# Patient Record
Sex: FEMALE | Race: WHITE | NOT HISPANIC OR LATINO | Employment: OTHER | URBAN - METROPOLITAN AREA
[De-identification: names, ages, dates, MRNs, and addresses within clinical notes are randomized per-mention and may not be internally consistent; named-entity substitution may affect disease eponyms.]

---

## 2017-09-22 ENCOUNTER — OFFICE VISIT (OUTPATIENT)
Dept: FAMILY MEDICINE | Facility: CLINIC | Age: 82
End: 2017-09-22
Payer: MEDICARE

## 2017-09-22 VITALS
TEMPERATURE: 98 F | SYSTOLIC BLOOD PRESSURE: 127 MMHG | HEIGHT: 67 IN | WEIGHT: 168 LBS | DIASTOLIC BLOOD PRESSURE: 74 MMHG | BODY MASS INDEX: 26.37 KG/M2 | HEART RATE: 76 BPM

## 2017-09-22 DIAGNOSIS — R53.83 FATIGUE, UNSPECIFIED TYPE: ICD-10-CM

## 2017-09-22 DIAGNOSIS — F51.04 PSYCHOPHYSIOLOGICAL INSOMNIA: ICD-10-CM

## 2017-09-22 DIAGNOSIS — Z00.00 ANNUAL PHYSICAL EXAM: ICD-10-CM

## 2017-09-22 DIAGNOSIS — J30.9 ALLERGIC SINUSITIS: Primary | ICD-10-CM

## 2017-09-22 DIAGNOSIS — Z13.220 SCREENING FOR LIPID DISORDERS: ICD-10-CM

## 2017-09-22 DIAGNOSIS — K64.4 RESIDUAL HEMORRHOIDAL SKIN TAGS: ICD-10-CM

## 2017-09-22 DIAGNOSIS — Z13.1 SCREENING FOR DIABETES MELLITUS: ICD-10-CM

## 2017-09-22 DIAGNOSIS — K59.00 CONSTIPATION, UNSPECIFIED CONSTIPATION TYPE: ICD-10-CM

## 2017-09-22 DIAGNOSIS — F32.0 MAJOR DEPRESSIVE DISORDER, SINGLE EPISODE, MILD WITH ANXIOUS DISTRESS: ICD-10-CM

## 2017-09-22 DIAGNOSIS — Z23 NEED FOR IMMUNIZATION AGAINST INFLUENZA: ICD-10-CM

## 2017-09-22 DIAGNOSIS — M81.0 SENILE OSTEOPOROSIS: ICD-10-CM

## 2017-09-22 DIAGNOSIS — E78.5 HYPERLIPIDEMIA, UNSPECIFIED HYPERLIPIDEMIA TYPE: ICD-10-CM

## 2017-09-22 PROCEDURE — 1159F MED LIST DOCD IN RCRD: CPT | Mod: ,,, | Performed by: NURSE PRACTITIONER

## 2017-09-22 PROCEDURE — 99213 OFFICE O/P EST LOW 20 MIN: CPT | Mod: PBBFAC,PO,25 | Performed by: NURSE PRACTITIONER

## 2017-09-22 PROCEDURE — G0008 ADMIN INFLUENZA VIRUS VAC: HCPCS | Mod: PBBFAC,PO

## 2017-09-22 PROCEDURE — 99999 PR PBB SHADOW E&M-EST. PATIENT-LVL III: CPT | Mod: PBBFAC,,, | Performed by: NURSE PRACTITIONER

## 2017-09-22 PROCEDURE — 99205 OFFICE O/P NEW HI 60 MIN: CPT | Mod: S$PBB,,, | Performed by: NURSE PRACTITIONER

## 2017-09-22 RX ORDER — DOCUSATE SODIUM 100 MG/1
100 CAPSULE, LIQUID FILLED ORAL DAILY
Qty: 30 CAPSULE | Refills: 0
Start: 2017-09-22 | End: 2020-12-03

## 2017-09-22 RX ORDER — FLUTICASONE PROPIONATE 50 MCG
1 SPRAY, SUSPENSION (ML) NASAL 2 TIMES DAILY
Qty: 16 G | Refills: 3 | Status: SHIPPED | OUTPATIENT
Start: 2017-09-22 | End: 2020-12-03

## 2017-09-22 RX ORDER — ALPRAZOLAM 0.25 MG/1
0.25 TABLET ORAL DAILY PRN
COMMUNITY
End: 2017-10-20 | Stop reason: SDUPTHER

## 2017-09-22 RX ORDER — ESCITALOPRAM OXALATE 20 MG/1
20 TABLET ORAL DAILY
Qty: 30 TABLET | Refills: 2 | Status: SHIPPED | OUTPATIENT
Start: 2017-09-22 | End: 2017-10-20

## 2017-09-22 NOTE — PROGRESS NOTES
Subjective:       Patient ID: Peg Moreno is a 81 y.o. female.    Chief Complaint: Sinus Problem    HPI   Chief Complaint  Chief Complaint   Patient presents with    Sinus Problem       HPI  Peg Moreno is a 81 y.o. female with multiple medical diagnoses as listed in the medical history and problem list that presents as a new patient to establish care. Patient is currently not taking any medication. Does take xanax 0.25 that was prescribed by physician after her  , states takes with extreme anxiety, about once per week.  Currently having sinus issues with swelling to left side of face that is chronic.  Has had previous sinus surgery and has seen Dr. Goodman in past, CT scan of sinuses negative with exception of scar tissue.  Saw Dr. Goodman recently and he prescribed Omnicef 300 mg BID which patient stopped yesterday after 9 days of treatment.  Recent problem with hemorrhoids and cream was prescribed by Dr. Bassett, prescribed a cream that has helped with hemorrhoids but still has some perineal irritation and an area to vulva that looks abnormal that she is concerned about.       PAST MEDICAL HISTORY:  Past Medical History:   Diagnosis Date    Depression        PAST SURGICAL HISTORY:  Past Surgical History:   Procedure Laterality Date    APPENDECTOMY      HERNIA REPAIR      SINUS SURGERY      TONSILLECTOMY         SOCIAL HISTORY:  Social History     Social History    Marital status:      Spouse name: N/A    Number of children: N/A    Years of education: N/A     Occupational History    Not on file.     Social History Main Topics    Smoking status: Former Smoker     Quit date: 11/15/1985    Smokeless tobacco: Never Used    Alcohol use No      Comment: rare    Drug use: Unknown    Sexual activity: Not on file     Other Topics Concern    Not on file     Social History Narrative    No narrative on file       FAMILY HISTORY:  No family history on file.    ALLERGIES  AND MEDICATIONS: updated and reviewed.  Review of patient's allergies indicates:   Allergen Reactions    Amoxicillin Anxiety     Headaches      Current Outpatient Prescriptions   Medication Sig Dispense Refill    alprazolam (XANAX) 0.25 MG tablet Take 0.25 mg by mouth daily as needed for Anxiety.      docusate sodium (COLACE) 100 MG capsule Take 1 capsule (100 mg total) by mouth once daily. 30 capsule 0    escitalopram oxalate (LEXAPRO) 20 MG tablet Take 1 tablet (20 mg total) by mouth once daily. 1/2 tab x 7days, then 1 tablet daily 30 tablet 2    fluticasone (FLONASE) 50 mcg/actuation nasal spray 1 spray by Each Nare route 2 (two) times daily. 16 g 3     No current facility-administered medications for this visit.          Review of Systems   Constitutional: Positive for appetite change. Negative for activity change, chills, fatigue, fever and unexpected weight change.        States appetite is fair, doesn't like eating alone, no weight loss   HENT: Positive for congestion, facial swelling, rhinorrhea, sinus pain and sore throat. Negative for ear pain and hearing loss.         C/o one week of sore throat.  Suffers with sinus allergies with runny nose and sore throat, left sided facial pain and swelling including swelling to left roof of mouth when waking this morning   Eyes: Negative for visual disturbance.        Vision good with eye glasses   Respiratory: Positive for cough. Negative for shortness of breath.         Cough that is dry, tickle in throat   Cardiovascular: Negative for chest pain, palpitations and leg swelling.   Gastrointestinal: Negative for abdominal pain, constipation, diarrhea, nausea and vomiting.        Takes stool softener with relief of constipation, recent hemorrhoids   Genitourinary: Positive for urgency. Negative for difficulty urinating, dysuria and frequency.        Occasional urge incontinence, not at night   Musculoskeletal: Negative for arthralgias and myalgias.   Skin:  "Negative for rash and wound.   Neurological: Negative for dizziness, tremors, weakness, numbness and headaches.   Hematological: Negative for adenopathy.   Psychiatric/Behavioral: Positive for dysphoric mood and sleep disturbance. Negative for suicidal ideas. The patient is nervous/anxious.         States does have some feelings of depression, has taken zoloft in the past and is not sure it helped.  Occasional anxiety. Does not sleep well due to waking to use bathroom and sometimes cannot go back to sleep.      Patient Health Questionnaire Depression Scale (PHQ-9):    Over the last 2 weeks, how often have you been bothered by any of the following problems?  (Not at all: 0; several days: 1, more than half the days: 2, nearly every day: 3)    1. Little interest or pleasure in doing things: 0  2. Feeling down, depressed, or hopeless: 1  3. Trouble falling or staying asleep, or sleeping too much: 3  4. Feeling tired or having little energy:0  5. Poor appetite or overeating:3  6. Feeling bad about yourself, or that you are a failure, or have let yourself or your family down:1  7. Trouble concentrating on things, such as reading the newspaper or watching television: 0  8. Moving or speaking so slowly that other people could have noticed. Or the opposite- being so fidgety or restless that you have been moving       around a lot more than usual: 0  9. Thoughts that you would be better off dead, or of hurting yourself in some way 0    Total Score: 8    (Score >15 - major depression; Score >20 - severe major depression)  Objective:       Vitals:    09/22/17 1401   BP: 127/74   BP Location: Right arm   Patient Position: Sitting   BP Method: Small (Manual)   Pulse: 76   Temp: 98.2 °F (36.8 °C)   TempSrc: Oral   Weight: 76.2 kg (168 lb)   Height: 5' 6.5" (1.689 m)     Physical Exam   Constitutional: She appears well-developed and well-nourished. No distress.   HENT:   Head: Normocephalic and atraumatic.   Right Ear: Tympanic " membrane, external ear and ear canal normal.   Left Ear: Tympanic membrane, external ear and ear canal normal.   Nose: Mucosal edema present. Right sinus exhibits no maxillary sinus tenderness and no frontal sinus tenderness. Left sinus exhibits no maxillary sinus tenderness and no frontal sinus tenderness.   Mouth/Throat: Oropharynx is clear and moist and mucous membranes are normal.   Nasal mucosa to right nare pale and boggy, left nare erythematous and edematous   Eyes: EOM are normal. Pupils are equal, round, and reactive to light. Right conjunctiva is not injected. Left conjunctiva is not injected.   Neck: Full passive range of motion without pain. Normal carotid pulses present. Carotid bruit is not present.   Cardiovascular: Normal rate and regular rhythm.    Pulses:       Carotid pulses are 2+ on the right side, and 2+ on the left side.       Radial pulses are 2+ on the right side, and 2+ on the left side.        Posterior tibial pulses are 2+ on the right side, and 2+ on the left side.   Pulmonary/Chest: Effort normal and breath sounds normal. No respiratory distress. She has no decreased breath sounds. She has no wheezes. She has no rhonchi. She has no rales.   Abdominal: Soft. Normal appearance and bowel sounds are normal. She exhibits no distension. There is no hepatosplenomegaly. There is no tenderness. No hernia.   Genitourinary:   Genitourinary Comments: Area of labia indicated unpigmented macular discoloration.  No evidence of infection or other abnormality.  Normal variation vs scar.  Skin tag hemorrhoids noted to external rectal area, non-thrombosed, non tender.    Lymphadenopathy:     She has no cervical adenopathy.   Neurological: She is alert. She has normal strength. No cranial nerve deficit. Gait normal.   Skin: Skin is warm, dry and intact. She is not diaphoretic.   Psychiatric: She has a normal mood and affect. Her speech is normal and behavior is normal. Thought content normal. Cognition and  memory are normal.       Assessment:       1. Allergic sinusitis    2. Annual physical exam    3. Residual hemorrhoidal skin tags    4. Major depressive disorder, single episode, mild with anxious distress    5. Psychophysiological insomnia    6. Constipation, unspecified constipation type    7. Fatigue, unspecified type    8. Screening for lipid disorders    9. Screening for diabetes mellitus    10. Need for immunization against influenza    11. BMI 26.0-26.9,adult    12. Hyperlipidemia, unspecified hyperlipidemia type        Plan:   Peg was seen today for sinus problem.    Diagnoses and all orders for this visit:    Allergic sinusitis  -     fluticasone (FLONASE) 50 mcg/actuation nasal spray; 1 spray by Each Nare route 2 (two) times daily.    Annual physical exam  -     CBC auto differential; Future    Residual hemorrhoidal skin tags   Continue cream prescribed by Dr. Bassett as needed  Major depressive disorder, single episode, mild with anxious distress  -     escitalopram oxalate (LEXAPRO) 20 MG tablet; Take 1 tablet (20 mg total) by mouth once daily. 1/2 tab x 7days, then 1 tablet daily  -     CBC auto differential; Future  - Follow up 4 weeks     Psychophysiological insomnia    escitalopram oxalate (LEXAPRO) 20 MG tablet; Take 1 tablet (20 mg total) by mouth once daily. 1/2 tab x 7days, then 1 tablet daily  Constipation, unspecified constipation type  -     docusate sodium (COLACE) 100 MG capsule; Take 1 capsule (100 mg total) by mouth once daily.    Fatigue, unspecified type  -     CBC auto differential; Future  -     Comprehensive metabolic panel; Future  -     TSH; Future    Screening for lipid disorders  -     Comprehensive metabolic panel; Future    Screening for diabetes mellitus  -     Comprehensive metabolic panel; Future  -     Lipid panel; Future    Need for immunization against influenza  -     Influenza - High Dose (65+) (PF) (IM)    BMI 26.0-26.9,adult   Normal for patient, continue as  is    Hyperlipidemia, unspecified hyperlipidemia type  -     Lipid panel; Future    Discussed Dexa Bone Density scan, patient to follow up in 4 weeks, will postpone until then.   Will request records from [facility] for DEXA bone density, mammogram, CT sinuses.

## 2017-09-22 NOTE — PATIENT INSTRUCTIONS
Step-by-Step  Using Nasal Spray    Date Last Reviewed: 5/27/2015  © 2232-3410 [employer]. [address] [place] [address], [place] [address]. All rights reserved. This information is not intended as a substitute for professional medical care. Always follow your healthcare professional's instructions.

## 2017-09-25 ENCOUNTER — LAB VISIT (OUTPATIENT)
Dept: LAB | Facility: HOSPITAL | Age: 82
End: 2017-09-25
Attending: NURSE PRACTITIONER
Payer: MEDICARE

## 2017-09-25 DIAGNOSIS — R53.83 FATIGUE, UNSPECIFIED TYPE: ICD-10-CM

## 2017-09-25 DIAGNOSIS — E78.5 HYPERLIPIDEMIA, UNSPECIFIED HYPERLIPIDEMIA TYPE: ICD-10-CM

## 2017-09-25 DIAGNOSIS — Z13.1 SCREENING FOR DIABETES MELLITUS: ICD-10-CM

## 2017-09-25 DIAGNOSIS — F32.0 MAJOR DEPRESSIVE DISORDER, SINGLE EPISODE, MILD WITH ANXIOUS DISTRESS: ICD-10-CM

## 2017-09-25 DIAGNOSIS — Z13.220 SCREENING FOR LIPID DISORDERS: ICD-10-CM

## 2017-09-25 DIAGNOSIS — Z00.00 ANNUAL PHYSICAL EXAM: ICD-10-CM

## 2017-09-25 LAB
ALBUMIN SERPL BCP-MCNC: 3.6 G/DL
ALP SERPL-CCNC: 70 U/L
ALT SERPL W/O P-5'-P-CCNC: 20 U/L
ANION GAP SERPL CALC-SCNC: 6 MMOL/L
AST SERPL-CCNC: 21 U/L
BASOPHILS # BLD AUTO: 0.02 K/UL
BASOPHILS NFR BLD: 0.4 %
BILIRUB SERPL-MCNC: 0.4 MG/DL
BUN SERPL-MCNC: 22 MG/DL
CALCIUM SERPL-MCNC: 9.4 MG/DL
CHLORIDE SERPL-SCNC: 108 MMOL/L
CHOLEST SERPL-MCNC: 240 MG/DL
CHOLEST/HDLC SERPL: 3.9 {RATIO}
CO2 SERPL-SCNC: 29 MMOL/L
CREAT SERPL-MCNC: 0.9 MG/DL
DIFFERENTIAL METHOD: ABNORMAL
EOSINOPHIL # BLD AUTO: 0.2 K/UL
EOSINOPHIL NFR BLD: 3.6 %
ERYTHROCYTE [DISTWIDTH] IN BLOOD BY AUTOMATED COUNT: 15.8 %
EST. GFR  (AFRICAN AMERICAN): >60 ML/MIN/1.73 M^2
EST. GFR  (NON AFRICAN AMERICAN): >60 ML/MIN/1.73 M^2
GLUCOSE SERPL-MCNC: 98 MG/DL
HCT VFR BLD AUTO: 43.6 %
HDLC SERPL-MCNC: 62 MG/DL
HDLC SERPL: 25.8 %
HGB BLD-MCNC: 13.7 G/DL
LDLC SERPL CALC-MCNC: 158.8 MG/DL
LYMPHOCYTES # BLD AUTO: 1.2 K/UL
LYMPHOCYTES NFR BLD: 26.2 %
MCH RBC QN AUTO: 24.7 PG
MCHC RBC AUTO-ENTMCNC: 31.4 G/DL
MCV RBC AUTO: 79 FL
MONOCYTES # BLD AUTO: 0.7 K/UL
MONOCYTES NFR BLD: 14.8 %
NEUTROPHILS # BLD AUTO: 2.5 K/UL
NEUTROPHILS NFR BLD: 55 %
NONHDLC SERPL-MCNC: 178 MG/DL
PLATELET # BLD AUTO: 133 K/UL
PMV BLD AUTO: ABNORMAL FL
POTASSIUM SERPL-SCNC: 4.5 MMOL/L
PROT SERPL-MCNC: 7.1 G/DL
RBC # BLD AUTO: 5.54 M/UL
SODIUM SERPL-SCNC: 143 MMOL/L
TRIGL SERPL-MCNC: 96 MG/DL
TSH SERPL DL<=0.005 MIU/L-ACNC: 1.91 UIU/ML
WBC # BLD AUTO: 4.47 K/UL

## 2017-09-25 PROCEDURE — 84443 ASSAY THYROID STIM HORMONE: CPT

## 2017-09-25 PROCEDURE — 36415 COLL VENOUS BLD VENIPUNCTURE: CPT | Mod: PO

## 2017-09-25 PROCEDURE — 85025 COMPLETE CBC W/AUTO DIFF WBC: CPT

## 2017-09-25 PROCEDURE — 80061 LIPID PANEL: CPT

## 2017-09-25 PROCEDURE — 80053 COMPREHEN METABOLIC PANEL: CPT

## 2017-10-19 ENCOUNTER — DOCUMENTATION ONLY (OUTPATIENT)
Dept: FAMILY MEDICINE | Facility: CLINIC | Age: 82
End: 2017-10-19

## 2017-10-19 NOTE — PROGRESS NOTES
Pre-Visit Chart Review  For Appointment Scheduled on 10/20/2017    Health Maintenance Due   Topic Date Due    TETANUS VACCINE  10/15/1953    Zoster Vaccine  10/15/1995    Pneumococcal (65+) (1 of 2 - PCV13) 10/15/2000

## 2017-10-20 ENCOUNTER — HOSPITAL ENCOUNTER (OUTPATIENT)
Dept: RADIOLOGY | Facility: CLINIC | Age: 82
Discharge: HOME | End: 2017-10-20
Attending: NURSE PRACTITIONER
Payer: MEDICARE

## 2017-10-20 ENCOUNTER — OFFICE VISIT (OUTPATIENT)
Dept: FAMILY MEDICINE | Facility: CLINIC | Age: 82
End: 2017-10-20
Payer: MEDICARE

## 2017-10-20 VITALS
TEMPERATURE: 98 F | BODY MASS INDEX: 26.37 KG/M2 | DIASTOLIC BLOOD PRESSURE: 72 MMHG | SYSTOLIC BLOOD PRESSURE: 138 MMHG | HEIGHT: 67 IN | WEIGHT: 168 LBS | HEART RATE: 72 BPM

## 2017-10-20 DIAGNOSIS — R12 HEARTBURN: ICD-10-CM

## 2017-10-20 DIAGNOSIS — R30.0 DYSURIA: ICD-10-CM

## 2017-10-20 DIAGNOSIS — R82.90 ABNORMAL URINE ODOR: ICD-10-CM

## 2017-10-20 DIAGNOSIS — M81.0 SENILE OSTEOPOROSIS: ICD-10-CM

## 2017-10-20 DIAGNOSIS — F41.9 ANXIETY: Primary | ICD-10-CM

## 2017-10-20 DIAGNOSIS — J30.9 ALLERGIC SINUSITIS: ICD-10-CM

## 2017-10-20 DIAGNOSIS — Z12.39 SCREENING FOR BREAST CANCER: ICD-10-CM

## 2017-10-20 PROCEDURE — 99999 PR PBB SHADOW E&M-EST. PATIENT-LVL III: CPT | Mod: PBBFAC,,, | Performed by: NURSE PRACTITIONER

## 2017-10-20 PROCEDURE — 99213 OFFICE O/P EST LOW 20 MIN: CPT | Mod: PBBFAC,25,PO | Performed by: NURSE PRACTITIONER

## 2017-10-20 PROCEDURE — 99213 OFFICE O/P EST LOW 20 MIN: CPT | Mod: S$PBB,,, | Performed by: NURSE PRACTITIONER

## 2017-10-20 PROCEDURE — 77080 DXA BONE DENSITY AXIAL: CPT | Mod: 26,,, | Performed by: RADIOLOGY

## 2017-10-20 PROCEDURE — 77080 DXA BONE DENSITY AXIAL: CPT | Mod: TC,PO

## 2017-10-20 RX ORDER — PHENOL/SODIUM PHENOLATE
1 AEROSOL, SPRAY (ML) MUCOUS MEMBRANE DAILY
Qty: 14 EACH | Refills: 0 | COMMUNITY
Start: 2017-10-20 | End: 2018-01-11

## 2017-10-20 RX ORDER — ALPRAZOLAM 0.25 MG/1
0.25 TABLET ORAL DAILY PRN
Qty: 30 TABLET | Refills: 0 | Status: SHIPPED | OUTPATIENT
Start: 2017-10-20 | End: 2023-06-22 | Stop reason: SDUPTHER

## 2017-10-20 NOTE — PROGRESS NOTES
Subjective:       Patient ID: Peg Moreno is a 82 y.o. female.    Chief Complaint: Follow-up (Depression)    HPI   Chief Complaint  Chief Complaint   Patient presents with    Follow-up     Depression       HPI  Peg Moreno is a 82 y.o. female with medical diagnoses as listed in the medical history and problem list that presents for follow up of depression and anxiety.  Was prescribed lexapro at last office visit and read side effects and decided that she does not want to take the medication.  Patient feels turmoil in her life has decreased.  Takes xanax 1/2 tablet of 0.25 mg as needed for anxiety and only takes once or twice per week and would prefer to treat anxiety with that. States flonase has relieved her sinus allergy and PND symptoms. Has been having some increased heartburn and is taking OTC omeprazole 20 mg with effect, plans to finish 14 day course.  Also c/o some burning with urination and a strong odor to urine in past week.  Has increased fluids due to dark color of urine.   BMI today is 26.71 and weight is unchanged from previous visit.  Established patient with last clinic appointment 9/22/2017.        PAST MEDICAL HISTORY:  Past Medical History:   Diagnosis Date    Depression        PAST SURGICAL HISTORY:  Past Surgical History:   Procedure Laterality Date    APPENDECTOMY      HERNIA REPAIR      SINUS SURGERY      TONSILLECTOMY         SOCIAL HISTORY:  Social History     Social History    Marital status:      Spouse name: N/A    Number of children: N/A    Years of education: N/A     Occupational History    Not on file.     Social History Main Topics    Smoking status: Former Smoker     Quit date: 11/15/1985    Smokeless tobacco: Never Used    Alcohol use No      Comment: rare    Drug use: Unknown    Sexual activity: Not on file     Other Topics Concern    Not on file     Social History Narrative    No narrative on file       FAMILY HISTORY:  History  reviewed. No pertinent family history.    ALLERGIES AND MEDICATIONS: updated and reviewed.  Review of patient's allergies indicates:   Allergen Reactions    Amoxicillin Anxiety     Headaches      Current Outpatient Prescriptions   Medication Sig Dispense Refill    alprazolam (XANAX) 0.25 MG tablet Take 1 tablet (0.25 mg total) by mouth daily as needed for Anxiety (take 1/2 to 1 tablet as needed). 30 tablet 0    docusate sodium (COLACE) 100 MG capsule Take 1 capsule (100 mg total) by mouth once daily. 30 capsule 0    fluticasone (FLONASE) 50 mcg/actuation nasal spray 1 spray by Each Nare route 2 (two) times daily. 16 g 3    omeprazole 20 mg TbEC Take 1 tablet by mouth once daily. 14 each 0     No current facility-administered medications for this visit.            Review of Systems   Constitutional: Negative for activity change, appetite change, chills, fatigue, fever and unexpected weight change.   HENT: Negative for congestion, postnasal drip, rhinorrhea, sinus pressure and sore throat.    Eyes: Negative for visual disturbance.        Wears glasses all the time   Respiratory: Positive for cough. Negative for choking and shortness of breath.         Non-productive cough mostly in the evening   Cardiovascular: Negative for chest pain and leg swelling.   Gastrointestinal: Negative for abdominal pain, blood in stool, constipation, diarrhea, nausea and vomiting.        Heartburn with relief with omeprazole, taking stool softener for constipation with effect   Genitourinary: Positive for dysuria. Negative for difficulty urinating and urgency.        Foul odor to urine, color dark, dysuria, no incontinence   Musculoskeletal: Positive for myalgias.        2 weeks ago experienced sharp intermittent pain to right breast, lasted for a week intermittently, gone now   Neurological: Negative for dizziness, weakness, numbness and headaches.   Psychiatric/Behavioral: Negative for dysphoric mood, sleep disturbance and suicidal  "ideas. The patient is nervous/anxious.         Occasional situational anxiety, not feeling depressed, sleeps well most nights       Objective:       Vitals:    10/20/17 0956   BP: 138/72   BP Location: Right arm   Patient Position: Sitting   BP Method: Medium (Manual)   Pulse: 72   Temp: 98.2 °F (36.8 °C)   Weight: 76.2 kg (168 lb)   Height: 5' 6.5" (1.689 m)     Physical Exam   Constitutional: She is oriented to person, place, and time. She appears well-developed and well-nourished. No distress.   HENT:   Head: Normocephalic and atraumatic.   Right Ear: Tympanic membrane, external ear and ear canal normal.   Left Ear: Tympanic membrane, external ear and ear canal normal.   Nose: Nose normal. No mucosal edema.   Mouth/Throat: Oropharynx is clear and moist and mucous membranes are normal. No oropharyngeal exudate.   Neck: Normal carotid pulses present. Carotid bruit is not present.   Cardiovascular: Normal rate, regular rhythm, S1 normal, S2 normal and normal heart sounds.  Exam reveals no gallop.    No murmur heard.  Pulses:       Carotid pulses are 2+ on the right side, and 2+ on the left side.       Radial pulses are 2+ on the right side, and 2+ on the left side.        Posterior tibial pulses are 2+ on the right side, and 2+ on the left side.   No edema   Pulmonary/Chest: Effort normal and breath sounds normal. No respiratory distress. She has no decreased breath sounds. She has no wheezes. She has no rhonchi. She has no rales.   Musculoskeletal: Normal range of motion.   Neurological: She is alert and oriented to person, place, and time. She has normal strength.   Skin: Skin is warm, dry and intact. Capillary refill takes less than 2 seconds. She is not diaphoretic. No pallor.   Psychiatric: She has a normal mood and affect. Her speech is normal and behavior is normal. Judgment and thought content normal. Cognition and memory are normal.   Nursing note and vitals reviewed.      Assessment:       1. Anxiety    2. " Dysuria    3. Abnormal urine odor    4. Heartburn    5. Allergic sinusitis    6. Screening for breast cancer    7. BMI 26.0-26.9,adult        Plan:       Peg was seen today for follow-up.    Diagnoses and all orders for this visit:    Anxiety  -     alprazolam (XANAX) 0.25 MG tablet; Take 1 tablet (0.25 mg total) by mouth daily as needed for Anxiety (take 1/2 to 1 tablet as needed) for acute anxiety    Dysuria  -     URINALYSIS; Future  -     Urine culture; Future  -  will call patient with results when available    Abnormal urine odor  -     URINALYSIS; Future  -     Urine culture; Future  - will call patient with results when available  Heartburn  -     omeprazole 20 mg TbEC; Take 1 tablet by mouth once daily.    Allergic sinusitis   Controlled with flonase  Screening for breast cancer  -     Mammo Digital Screening Bilateral With CAD; Future    BMI 26.0-26.9,adult   Healthy weight for patient    Return for Keep scheduled appointment with Dr. Philippe in January.

## 2017-10-23 ENCOUNTER — HOSPITAL ENCOUNTER (OUTPATIENT)
Dept: RADIOLOGY | Facility: CLINIC | Age: 82
Discharge: HOME | End: 2017-10-23
Attending: NURSE PRACTITIONER
Payer: MEDICARE

## 2017-10-23 DIAGNOSIS — Z12.39 SCREENING FOR BREAST CANCER: ICD-10-CM

## 2017-10-23 DIAGNOSIS — M85.852 OSTEOPENIA OF BOTH HIPS: Primary | ICD-10-CM

## 2017-10-23 DIAGNOSIS — R31.9 HEMATURIA, UNSPECIFIED TYPE: Primary | ICD-10-CM

## 2017-10-23 DIAGNOSIS — M85.851 OSTEOPENIA OF BOTH HIPS: Primary | ICD-10-CM

## 2017-10-23 DIAGNOSIS — M85.851 OSTEOPENIA OF BOTH HIPS: ICD-10-CM

## 2017-10-23 DIAGNOSIS — M85.852 OSTEOPENIA OF BOTH HIPS: ICD-10-CM

## 2017-10-23 PROCEDURE — 77067 SCR MAMMO BI INCL CAD: CPT | Mod: TC

## 2017-10-23 PROCEDURE — 77067 SCR MAMMO BI INCL CAD: CPT | Mod: 26,,, | Performed by: RADIOLOGY

## 2017-10-23 PROCEDURE — 77063 BREAST TOMOSYNTHESIS BI: CPT | Mod: 26,,, | Performed by: RADIOLOGY

## 2017-10-30 ENCOUNTER — LAB VISIT (OUTPATIENT)
Dept: LAB | Facility: HOSPITAL | Age: 82
End: 2017-10-30
Attending: NURSE PRACTITIONER
Payer: MEDICARE

## 2017-10-30 DIAGNOSIS — R31.9 HEMATURIA, UNSPECIFIED TYPE: ICD-10-CM

## 2017-10-30 LAB
BILIRUB UR QL STRIP: NEGATIVE
CLARITY UR REFRACT.AUTO: ABNORMAL
COLOR UR AUTO: ABNORMAL
GLUCOSE UR QL STRIP: NEGATIVE
HGB UR QL STRIP: NEGATIVE
KETONES UR QL STRIP: ABNORMAL
LEUKOCYTE ESTERASE UR QL STRIP: NEGATIVE
NITRITE UR QL STRIP: NEGATIVE
PH UR STRIP: 5 [PH] (ref 5–8)
PROT UR QL STRIP: NEGATIVE
SP GR UR STRIP: 1.03 (ref 1–1.03)
URN SPEC COLLECT METH UR: ABNORMAL
UROBILINOGEN UR STRIP-ACNC: ABNORMAL EU/DL

## 2017-10-30 PROCEDURE — 81003 URINALYSIS AUTO W/O SCOPE: CPT

## 2017-11-15 ENCOUNTER — OFFICE VISIT (OUTPATIENT)
Dept: FAMILY MEDICINE | Facility: CLINIC | Age: 82
End: 2017-11-15
Payer: MEDICARE

## 2017-11-15 VITALS
HEART RATE: 78 BPM | TEMPERATURE: 98 F | BODY MASS INDEX: 26.19 KG/M2 | OXYGEN SATURATION: 88 % | DIASTOLIC BLOOD PRESSURE: 73 MMHG | SYSTOLIC BLOOD PRESSURE: 155 MMHG | WEIGHT: 166.88 LBS | HEIGHT: 67 IN

## 2017-11-15 DIAGNOSIS — J32.0 LEFT MAXILLARY SINUSITIS: Primary | ICD-10-CM

## 2017-11-15 PROCEDURE — 99999 PR PBB SHADOW E&M-EST. PATIENT-LVL III: CPT | Mod: PBBFAC,,, | Performed by: FAMILY MEDICINE

## 2017-11-15 PROCEDURE — 99213 OFFICE O/P EST LOW 20 MIN: CPT | Mod: PBBFAC,PO | Performed by: FAMILY MEDICINE

## 2017-11-15 PROCEDURE — 96372 THER/PROPH/DIAG INJ SC/IM: CPT | Mod: PBBFAC,PO

## 2017-11-15 PROCEDURE — 99214 OFFICE O/P EST MOD 30 MIN: CPT | Mod: S$PBB,,, | Performed by: FAMILY MEDICINE

## 2017-11-15 RX ORDER — BETAMETHASONE SODIUM PHOSPHATE AND BETAMETHASONE ACETATE 3; 3 MG/ML; MG/ML
9 INJECTION, SUSPENSION INTRA-ARTICULAR; INTRALESIONAL; INTRAMUSCULAR; SOFT TISSUE
Status: COMPLETED | OUTPATIENT
Start: 2017-11-15 | End: 2017-11-15

## 2017-11-15 RX ADMIN — BETAMETHASONE ACETATE AND BETAMETHASONE SODIUM PHOSPHATE 9 MG: 3; 3 INJECTION, SUSPENSION INTRA-ARTICULAR; INTRALESIONAL; INTRAMUSCULAR; SOFT TISSUE at 01:11

## 2017-11-15 NOTE — PROGRESS NOTES
Subjective:       Patient ID: Peg Moreno is a 82 y.o. female.    Chief Complaint: Sore Throat, facial pressure    URI    This is a new problem. Episode onset: 10 days. The problem has been waxing and waning. There has been no fever. Associated symptoms include congestion, coughing, rhinorrhea, sinus pain and a sore throat. Pertinent negatives include no abdominal pain, chest pain, headaches, nausea or rash. She has tried decongestant and acetaminophen for the symptoms. The treatment provided mild relief.     Review of Systems   Constitutional: Negative for fever.   HENT: Positive for congestion, rhinorrhea, sinus pain and sore throat.    Respiratory: Positive for cough. Negative for shortness of breath.    Cardiovascular: Negative for chest pain.   Gastrointestinal: Negative for abdominal pain and nausea.   Skin: Negative for rash.   Neurological: Negative for headaches.   All other systems reviewed and are negative.      Objective:      Physical Exam   Constitutional: She appears well-developed. No distress.   HENT:   Right Ear: Tympanic membrane is not erythematous.   Left Ear: Tympanic membrane is not erythematous.   Nose: Mucosal edema present. Right sinus exhibits no maxillary sinus tenderness. Left sinus exhibits maxillary sinus tenderness.   Mouth/Throat: Posterior oropharyngeal erythema present.   Neck: Neck supple.   Cardiovascular: Normal rate and regular rhythm.    No murmur heard.  Pulmonary/Chest: Effort normal and breath sounds normal.   Lymphadenopathy:     She has no cervical adenopathy.       Assessment:       1. Left maxillary sinusitis        Plan:         Peg was seen today for sore throat, facial pressure.    Diagnoses and all orders for this visit:    Left maxillary sinusitis  -     betamethasone acetate-betamethasone sodium phosphate injection 9 mg; Inject 1.5 mLs (9 mg total) into the muscle one time.

## 2017-11-17 ENCOUNTER — TELEPHONE (OUTPATIENT)
Dept: FAMILY MEDICINE | Facility: CLINIC | Age: 82
End: 2017-11-17

## 2017-11-17 NOTE — TELEPHONE ENCOUNTER
Patient states her cheeks were red on yesterday, but today they are pinkish.  Patient states she is a little weak and is concern it maybe her blood pressure because it was elevated on Wed when in the clinic.  Advised patient if not better before tomorrow to go to the ED or call the clinic in the morning for an appt.  Verbalized understanding

## 2017-11-17 NOTE — TELEPHONE ENCOUNTER
----- Message from Naima Powers sent at 11/17/2017 12:59 PM CST -----  Contact: patient  Patient calling to speak with the Nurse. She is feeling really weak and her cheeks are red from the steroid injection yesterday. Please advise.  Call back   Thanks!

## 2017-11-20 ENCOUNTER — TELEPHONE (OUTPATIENT)
Dept: FAMILY MEDICINE | Facility: CLINIC | Age: 82
End: 2017-11-20

## 2017-11-20 ENCOUNTER — DOCUMENTATION ONLY (OUTPATIENT)
Dept: FAMILY MEDICINE | Facility: CLINIC | Age: 82
End: 2017-11-20

## 2017-11-20 NOTE — TELEPHONE ENCOUNTER
Patient needs to come in to be seen, patient didn't start Lexapro, needs to be evaluated .    ----- Message from Vanita Arreguin sent at 11/20/2017  7:31 AM CST -----  Contact: [phone]  Patient called asking to speak with the nurse regarding anxiety medication. Thanks!

## 2017-11-20 NOTE — PROGRESS NOTES
Pre-Visit Chart Review  For Appointment Scheduled on 11/21/2017    Health Maintenance Due   Topic Date Due    TETANUS VACCINE  10/15/1953    Zoster Vaccine  10/15/1995    Pneumococcal (65+) (1 of 2 - PCV13) 10/15/2000

## 2017-11-21 ENCOUNTER — OFFICE VISIT (OUTPATIENT)
Dept: FAMILY MEDICINE | Facility: CLINIC | Age: 82
End: 2017-11-21
Payer: MEDICARE

## 2017-11-21 VITALS
BODY MASS INDEX: 26.06 KG/M2 | SYSTOLIC BLOOD PRESSURE: 138 MMHG | OXYGEN SATURATION: 99 % | TEMPERATURE: 99 F | HEIGHT: 67 IN | WEIGHT: 166 LBS | HEART RATE: 80 BPM | DIASTOLIC BLOOD PRESSURE: 62 MMHG

## 2017-11-21 DIAGNOSIS — F51.04 PSYCHOPHYSIOLOGICAL INSOMNIA: ICD-10-CM

## 2017-11-21 DIAGNOSIS — F41.8 DEPRESSION WITH ANXIETY: Primary | ICD-10-CM

## 2017-11-21 PROCEDURE — 99213 OFFICE O/P EST LOW 20 MIN: CPT | Mod: S$PBB,,, | Performed by: NURSE PRACTITIONER

## 2017-11-21 PROCEDURE — 99999 PR PBB SHADOW E&M-EST. PATIENT-LVL IV: CPT | Mod: PBBFAC,,, | Performed by: NURSE PRACTITIONER

## 2017-11-21 PROCEDURE — 99214 OFFICE O/P EST MOD 30 MIN: CPT | Mod: PBBFAC,PO | Performed by: NURSE PRACTITIONER

## 2017-11-21 RX ORDER — SERTRALINE HYDROCHLORIDE 50 MG/1
50 TABLET, FILM COATED ORAL DAILY
Qty: 30 TABLET | Refills: 1 | Status: SHIPPED | OUTPATIENT
Start: 2017-11-21 | End: 2018-01-11

## 2017-11-21 NOTE — PROGRESS NOTES
Subjective:       Patient ID: Peg Moreno is a 82 y.o. female.    Chief Complaint: Anxiety (Follow-Up ) and Depression    HPI   Chief Complaint  Chief Complaint   Patient presents with    Anxiety     Follow-Up     Depression       HPI  Peg Moreno is a 82 y.o. female with medical diagnoses as listed in the medical history and problem list that presents with continued concerns about depression and anxiety.  Patient lives alone and is worried that if something happens to her no one will know.  Nearest relative is nephew in [place].  Only child, son, lives in [place]. Also has no friends and is not getting out of the house much.  Discussed Life Alert and [facility] Alert with patient and provided with toll free numbers for both.  Also provided information and calendar for [facility] to patient. Patient was previously prescribed lexapro but chose not to start the medication due to side effects, stated preferred to continue xanax as needed.  Patient is also concerned about possible reaction to steroid injection that she received last week in clinic for sore throat and sinusitis, face was very red and had itching to bilateral forearms.  Patient has had swelling to left side of face cheek area x 3 years with unknown cause after multiple consults with neurology and dentists.  States this swelling was relieved with steroid injection.  Also states that she is feeling better as far as sore throat and sinuses. BMI today is 26.53 and weight is stable since last visit. Established patient with last clinic appointment 11/15/2017.        PAST MEDICAL HISTORY:  Past Medical History:   Diagnosis Date    Depression        PAST SURGICAL HISTORY:  Past Surgical History:   Procedure Laterality Date    APPENDECTOMY      HERNIA REPAIR      SINUS SURGERY      TONSILLECTOMY         SOCIAL HISTORY:  Social History     Social History    Marital status:      Spouse name:  N/A    Number of children: N/A    Years of education: N/A     Occupational History    Not on file.     Social History Main Topics    Smoking status: Former Smoker     Quit date: 11/15/1985    Smokeless tobacco: Never Used    Alcohol use No      Comment: rare    Drug use: Unknown    Sexual activity: Not on file     Other Topics Concern    Not on file     Social History Narrative    No narrative on file       FAMILY HISTORY:  History reviewed. No pertinent family history.    ALLERGIES AND MEDICATIONS: updated and reviewed.  Review of patient's allergies indicates:   Allergen Reactions    Amoxicillin Anxiety     Headaches      Current Outpatient Prescriptions   Medication Sig Dispense Refill    alprazolam (XANAX) 0.25 MG tablet Take 1 tablet (0.25 mg total) by mouth daily as needed for Anxiety (take 1/2 to 1 tablet as needed). 30 tablet 0    rsm-R6-lrd20-zinc--valerio-bor (CALTRATE 600+D PLUS MINERALS) 600 mg calcium- 800 unit-50 mg Tab Take 1 tablet by mouth once daily. 365 tablet 11    docusate sodium (COLACE) 100 MG capsule Take 1 capsule (100 mg total) by mouth once daily. 30 capsule 0    fluticasone (FLONASE) 50 mcg/actuation nasal spray 1 spray by Each Nare route 2 (two) times daily. 16 g 3    omeprazole 20 mg TbEC Take 1 tablet by mouth once daily. 14 each 0    sertraline (ZOLOFT) 50 MG tablet Take 1 tablet (50 mg total) by mouth once daily. 30 tablet 1     No current facility-administered medications for this visit.            Review of Systems   Constitutional: Negative for activity change, appetite change, chills, fatigue and fever.   HENT: Negative for congestion, postnasal drip, rhinorrhea and sore throat.    Eyes: Negative for visual disturbance.        Wears glasses all the time   Respiratory: Negative for cough and shortness of breath.    Cardiovascular: Negative for chest pain, palpitations and leg swelling.   Gastrointestinal: Negative for abdominal pain, constipation, diarrhea, nausea  "and vomiting.   Genitourinary: Positive for frequency. Negative for difficulty urinating and urgency.        C/O urinary frequency at night, wakes 2-3 times each night   Musculoskeletal: Negative for arthralgias and myalgias.   Skin: Negative for rash and wound.   Neurological: Negative for dizziness, numbness and headaches.   Hematological: Negative for adenopathy.   Psychiatric/Behavioral: Positive for sleep disturbance. Negative for dysphoric mood and suicidal ideas. The patient is not nervous/anxious.        Objective:       Vitals:    11/21/17 1014   BP: 138/62   BP Location: Right arm   Patient Position: Sitting   BP Method: Large (Automatic)   Pulse: 80   Temp: 98.5 °F (36.9 °C)   TempSrc: Oral   SpO2: 99%   Weight: 75.3 kg (166 lb)   Height: 5' 6.5" (1.689 m)     Physical Exam   Constitutional: Vital signs are normal. She appears well-developed and well-nourished. No distress.   HENT:   Head: Normocephalic and atraumatic.   Right Ear: Tympanic membrane, external ear and ear canal normal.   Left Ear: Tympanic membrane, external ear and ear canal normal.   Nose: Nose normal. No mucosal edema.   Mouth/Throat: Oropharynx is clear and moist and mucous membranes are normal. No oropharyngeal exudate, posterior oropharyngeal edema or posterior oropharyngeal erythema.   Neck: Normal carotid pulses present. Carotid bruit is not present.   Cardiovascular: Normal rate, regular rhythm, S1 normal, S2 normal, normal heart sounds, intact distal pulses and normal pulses.  Exam reveals no gallop.    No murmur heard.  Pulses:       Carotid pulses are 2+ on the right side, and 2+ on the left side.       Radial pulses are 2+ on the right side, and 2+ on the left side.   No edema   Pulmonary/Chest: Effort normal and breath sounds normal. No respiratory distress. She has no decreased breath sounds. She has no wheezes. She has no rhonchi. She has no rales.   Musculoskeletal: Normal range of motion.   Lymphadenopathy:     She has no " cervical adenopathy.   Skin: Skin is warm, dry and intact. Capillary refill takes less than 2 seconds. She is not diaphoretic. No pallor.   Psychiatric: She has a normal mood and affect. Her speech is normal and behavior is normal. Judgment and thought content normal. Cognition and memory are normal.   Nursing note and vitals reviewed.      Assessment:       1. Depression with anxiety    2. Psychophysiological insomnia    3. BMI 26.0-26.9,adult        Plan:   Peg was seen today for anxiety and depression.  Allergy to celestone added to record for flushing and itching.  Diagnoses and all orders for this visit:    Depression with anxiety  -     sertraline (ZOLOFT) 50 MG tablet; Take 1 tablet (50 mg total) by mouth once daily.  - Continue xanax as needed    Psychophysiological insomnia   Hopefully will be relieved with anxiety treated with zoloft  BMI 26.0-26.9,adult   Healthy weight  Return in about 6 weeks (around 1/2/2018) for Has appointment scheduled with Dr. Philippe.

## 2017-11-28 ENCOUNTER — TELEPHONE (OUTPATIENT)
Dept: FAMILY MEDICINE | Facility: CLINIC | Age: 82
End: 2017-11-28

## 2017-11-28 NOTE — TELEPHONE ENCOUNTER
It takes at least 2 weeks on the medication to notice a difference.  If she is not having side effects from the medication, she should continue to take it.   Thanks.  Noreen

## 2017-11-28 NOTE — TELEPHONE ENCOUNTER
Patient states she is still anxious, she was fine during the Thanksgiving holiday she spent time with her family Tuesday-Weekend. Patient states she doesn't believe the Zoloft is working for her. She has only taken the medication twice. I asked her did she try getting out to the [facility] to interact with her peers, she states she hasn't gotten around to it, she verbalized her problem with being anxious comes from being along. At this time i'm uncertain for what the patient is asking, she wants to know what should she do to help her cope with being anxious. She also takes the xanax when needed.           ----- Message from Alcon Montes De Oca sent at 11/28/2017  9:12 AM CST -----  Contact: pt  Pt is requesting a callback from nurse(wants to talk to about the medication Zoloft that she was prescribed)  Call Back#[phone]  Thanks

## 2018-01-10 ENCOUNTER — DOCUMENTATION ONLY (OUTPATIENT)
Dept: FAMILY MEDICINE | Facility: CLINIC | Age: 83
End: 2018-01-10

## 2018-01-11 ENCOUNTER — LAB VISIT (OUTPATIENT)
Dept: LAB | Facility: HOSPITAL | Age: 83
End: 2018-01-11
Attending: FAMILY MEDICINE
Payer: MEDICARE

## 2018-01-11 ENCOUNTER — OFFICE VISIT (OUTPATIENT)
Dept: FAMILY MEDICINE | Facility: CLINIC | Age: 83
End: 2018-01-11
Payer: MEDICARE

## 2018-01-11 VITALS
WEIGHT: 165.38 LBS | HEART RATE: 69 BPM | HEIGHT: 67 IN | BODY MASS INDEX: 25.96 KG/M2 | TEMPERATURE: 97 F | DIASTOLIC BLOOD PRESSURE: 76 MMHG | SYSTOLIC BLOOD PRESSURE: 140 MMHG

## 2018-01-11 DIAGNOSIS — K64.9 HEMORRHOIDS, UNSPECIFIED HEMORRHOID TYPE: ICD-10-CM

## 2018-01-11 DIAGNOSIS — R71.8 MICROCYTOSIS: Primary | ICD-10-CM

## 2018-01-11 DIAGNOSIS — Z23 NEED FOR VACCINATION WITH 13-POLYVALENT PNEUMOCOCCAL CONJUGATE VACCINE: ICD-10-CM

## 2018-01-11 DIAGNOSIS — E78.5 HYPERLIPIDEMIA, UNSPECIFIED HYPERLIPIDEMIA TYPE: ICD-10-CM

## 2018-01-11 DIAGNOSIS — E55.9 VITAMIN D DEFICIENCY: ICD-10-CM

## 2018-01-11 DIAGNOSIS — L65.9 HAIR LOSS: ICD-10-CM

## 2018-01-11 DIAGNOSIS — R71.8 MICROCYTOSIS: ICD-10-CM

## 2018-01-11 DIAGNOSIS — F43.21 GRIEF REACTION: ICD-10-CM

## 2018-01-11 DIAGNOSIS — L30.4 INTERTRIGO: ICD-10-CM

## 2018-01-11 LAB
25(OH)D3+25(OH)D2 SERPL-MCNC: 42 NG/ML
BASOPHILS # BLD AUTO: 0.04 K/UL
BASOPHILS NFR BLD: 0.7 %
DIFFERENTIAL METHOD: ABNORMAL
EOSINOPHIL # BLD AUTO: 0.2 K/UL
EOSINOPHIL NFR BLD: 3.5 %
ERYTHROCYTE [DISTWIDTH] IN BLOOD BY AUTOMATED COUNT: 16 %
FERRITIN SERPL-MCNC: 144 NG/ML
HCT VFR BLD AUTO: 43 %
HGB BLD-MCNC: 13.4 G/DL
IMM GRANULOCYTES # BLD AUTO: 0.01 K/UL
IMM GRANULOCYTES NFR BLD AUTO: 0.2 %
IRON SERPL-MCNC: 59 UG/DL
LYMPHOCYTES # BLD AUTO: 1.3 K/UL
LYMPHOCYTES NFR BLD: 20.7 %
MCH RBC QN AUTO: 24.8 PG
MCHC RBC AUTO-ENTMCNC: 31.2 G/DL
MCV RBC AUTO: 80 FL
MONOCYTES # BLD AUTO: 0.9 K/UL
MONOCYTES NFR BLD: 15 %
NEUTROPHILS # BLD AUTO: 3.6 K/UL
NEUTROPHILS NFR BLD: 59.9 %
NRBC BLD-RTO: 0 /100 WBC
PLATELET # BLD AUTO: 135 K/UL
PMV BLD AUTO: ABNORMAL FL
RBC # BLD AUTO: 5.4 M/UL
SATURATED IRON: 15 %
TOTAL IRON BINDING CAPACITY: 382 UG/DL
TRANSFERRIN SERPL-MCNC: 258 MG/DL
WBC # BLD AUTO: 6.05 K/UL

## 2018-01-11 PROCEDURE — 99999 PR PBB SHADOW E&M-EST. PATIENT-LVL III: CPT | Mod: PBBFAC,,, | Performed by: FAMILY MEDICINE

## 2018-01-11 PROCEDURE — 83540 ASSAY OF IRON: CPT

## 2018-01-11 PROCEDURE — 36415 COLL VENOUS BLD VENIPUNCTURE: CPT | Mod: PO

## 2018-01-11 PROCEDURE — 99214 OFFICE O/P EST MOD 30 MIN: CPT | Mod: S$PBB,,, | Performed by: FAMILY MEDICINE

## 2018-01-11 PROCEDURE — 99213 OFFICE O/P EST LOW 20 MIN: CPT | Mod: PBBFAC,PO | Performed by: FAMILY MEDICINE

## 2018-01-11 PROCEDURE — G0009 ADMIN PNEUMOCOCCAL VACCINE: HCPCS | Mod: PBBFAC,PO

## 2018-01-11 PROCEDURE — 85025 COMPLETE CBC W/AUTO DIFF WBC: CPT

## 2018-01-11 PROCEDURE — 82306 VITAMIN D 25 HYDROXY: CPT

## 2018-01-11 PROCEDURE — 82728 ASSAY OF FERRITIN: CPT

## 2018-01-11 RX ORDER — CLOTRIMAZOLE AND BETAMETHASONE DIPROPIONATE 10; .64 MG/G; MG/G
CREAM TOPICAL 2 TIMES DAILY
Qty: 60 G | Refills: 0 | Status: SHIPPED | OUTPATIENT
Start: 2018-01-11 | End: 2020-12-03

## 2018-01-11 NOTE — PROGRESS NOTES
Patient, Peg Moreno (MRN #[id number]), presented with a recent Platelet count less than 150 K/uL consistent with the definition of thrombocytopenia (ICD10 - D69.6).    Platelets   Date Value Ref Range Status   01/11/2018 135 (L) 150 - 350 K/uL Final     The patient's thrombocytopenia was monitored, evaluated, addressed and/or treated. This addendum to the medical record is made on 01/11/2018.

## 2018-01-11 NOTE — PROGRESS NOTES
"Subjective:       Patient ID: Peg Moreno is a 82 y.o. female.    Chief Complaint: Establish Care    Patient Active Problem List   Diagnosis    Cervicalgia    Neck pain    Osteopenia of both hips    Hyperlipidemia   c/o thinning hair since   5 years [place]    Reviewed recent labs  No visits with results within 1 Month(s) from this visit.   Latest known visit with results is:   Lab Visit on 10/30/2017   Component Date Value Ref Range Status    Specimen UA 10/30/2017 Urine, Clean Catch   Final    Color, UA 10/30/2017 Silvia  Yellow, Straw, Silvia Final    Appearance, UA 10/30/2017 Hazy* Clear Final    pH, UA 10/30/2017 5.0  5.0 - 8.0 Final    Specific Gravity, UA 10/30/2017 1.030  1.005 - 1.030 Final    Protein, UA 10/30/2017 Negative  Negative Final    Glucose, UA 10/30/2017 Negative  Negative Final    Ketones, UA 10/30/2017 Trace* Negative Final    Bilirubin (UA) 10/30/2017 Negative  Negative Final    Occult Blood UA 10/30/2017 Negative  Negative Final    Nitrite, UA 10/30/2017 Negative  Negative Final    Urobilinogen, UA 10/30/2017 2.0-3.0* <2.0 EU/dL Final    Leukocytes, UA 10/30/2017 Negative  Negative Final     Has had anxiety issues for years due to grief and social isolation. Tried zoloft for 3 days but made her "crazy".  Took xanax daily for a while and then stopped.  Feels better than she has in years x 2 months-no xanax.  Has been around people more and better outlook    Has occ hemorrhoids, itch and rash around anus and vulva relieved with lotrisone prn-needs refill.  Sees Dr. Bassett for gyn care  HPI  Review of Systems   Constitutional: Negative for fatigue and unexpected weight change.   Respiratory: Negative for chest tightness and shortness of breath.    Cardiovascular: Negative for chest pain, palpitations and leg swelling.   Gastrointestinal: Negative for abdominal pain.   Musculoskeletal: Negative for arthralgias.   Neurological: Negative for " dizziness, syncope, light-headedness and headaches.   Psychiatric/Behavioral: Negative for dysphoric mood and sleep disturbance. The patient is not nervous/anxious.        Objective:      Physical Exam   Constitutional: She is oriented to person, place, and time. She appears well-developed and well-nourished.   Cardiovascular: Normal rate, regular rhythm and normal heart sounds.    Pulmonary/Chest: Effort normal and breath sounds normal.   Musculoskeletal: She exhibits no edema.   Neurological: She is alert and oriented to person, place, and time.   Skin: Skin is warm and dry.   Psychiatric: She has a normal mood and affect.   Nursing note and vitals reviewed.      Assessment:       1. Microcytosis    2. Hyperlipidemia, unspecified hyperlipidemia type    3. Need for vaccination with 13-polyvalent pneumococcal conjugate vaccine    4. Hair loss    5. Vitamin D deficiency    6. Hemorrhoids, unspecified hemorrhoid type    7. Intertrigo    8. Grief reaction        Plan:       1. Hyperlipidemia, unspecified hyperlipidemia type  Stable condition.  Continue current medications.  Will adjust based on lab findings or if condition changes.      2. Microcytosis  Screen and treat as indicated:    - CBC auto differential; Future  - Iron and TIBC; Future  - Ferritin; Future  - Hemoglobin Electrophoresis Cascade, Blood; Future    3. Need for vaccination with 13-polyvalent pneumococcal conjugate vaccine  Immunize today.  Counseled patient on risks, benefits and side effects.  Patient elected to proceed with vaccination.    - (In Office Administered) Pneumococcal Conjugate Vaccine (13 Valent) (IM)    4. 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  Possible telogen effluvium or vitamin def.  Recommended otc biotin.  Screen and treat as indicated:  Consider topical rogaine  - Vitamin D; Future    5. Vitamin D deficiency  Screen and treat as indicated:    - Vitamin D; Future    6. Hemorrhoids, unspecified hemorrhoid  type  Cont to treat prn  - clotrimazole-betamethasone 1-0.05% (LOTRISONE) cream; Apply topically 2 (two) times daily.  Dispense: 60 g; Refill: 0    7. Intertrigo  Treat prn  - clotrimazole-betamethasone 1-0.05% (LOTRISONE) cream; Apply topically 2 (two) times daily.  Dispense: 60 g; Refill: 0    8. Grief reaction  Resolved    Reeval annually and prn

## 2018-01-15 LAB
HGB A MFR BLD ELPH: 97.8 % (ref 95.8–98)
HGB A2 MFR BLD: 2.2 % (ref 2–3.3)
HGB F MFR BLD: 0 % (ref 0–0.9)
HGB FRACT BLD ELPH-IMP: NORMAL
HGB OTHER MFR BLD ELPH: 0 %
THEVP VARIANT 2: NORMAL
THEVP VARIANT 3: NORMAL

## 2018-06-19 ENCOUNTER — LAB VISIT (OUTPATIENT)
Dept: LAB | Facility: HOSPITAL | Age: 83
End: 2018-06-19
Attending: NURSE PRACTITIONER
Payer: MEDICARE

## 2018-06-19 ENCOUNTER — OFFICE VISIT (OUTPATIENT)
Dept: FAMILY MEDICINE | Facility: CLINIC | Age: 83
End: 2018-06-19
Payer: MEDICARE

## 2018-06-19 VITALS
DIASTOLIC BLOOD PRESSURE: 73 MMHG | BODY MASS INDEX: 26.26 KG/M2 | TEMPERATURE: 98 F | HEIGHT: 67 IN | HEART RATE: 75 BPM | WEIGHT: 167.31 LBS | SYSTOLIC BLOOD PRESSURE: 139 MMHG

## 2018-06-19 DIAGNOSIS — L85.3 DRY SKIN: ICD-10-CM

## 2018-06-19 DIAGNOSIS — L65.9 HAIR LOSS: ICD-10-CM

## 2018-06-19 DIAGNOSIS — J30.9 ALLERGIC SINUSITIS: Primary | ICD-10-CM

## 2018-06-19 DIAGNOSIS — L60.3 BRITTLE NAILS: ICD-10-CM

## 2018-06-19 DIAGNOSIS — R20.0 NUMBNESS IN FEET: ICD-10-CM

## 2018-06-19 DIAGNOSIS — R20.8 BURNING SENSATION OF FEET: ICD-10-CM

## 2018-06-19 LAB — TSH SERPL DL<=0.005 MIU/L-ACNC: 0.76 UIU/ML

## 2018-06-19 PROCEDURE — 99999 PR PBB SHADOW E&M-EST. PATIENT-LVL IV: CPT | Mod: PBBFAC,,, | Performed by: NURSE PRACTITIONER

## 2018-06-19 PROCEDURE — 99214 OFFICE O/P EST MOD 30 MIN: CPT | Mod: PBBFAC,PO | Performed by: NURSE PRACTITIONER

## 2018-06-19 PROCEDURE — 99214 OFFICE O/P EST MOD 30 MIN: CPT | Mod: S$PBB,,, | Performed by: NURSE PRACTITIONER

## 2018-06-19 PROCEDURE — 36415 COLL VENOUS BLD VENIPUNCTURE: CPT | Mod: PO

## 2018-06-19 PROCEDURE — 84443 ASSAY THYROID STIM HORMONE: CPT

## 2018-06-19 NOTE — PROGRESS NOTES
Subjective:       Patient ID: Peg Moreno is a 82 y.o. female.    Chief Complaint: Sore Throat (pain to left side of tongue)    HPI   Chief Complaint  Chief Complaint   Patient presents with    Sore Throat     pain to left side of tongue         HPI  Peg Moreno is a 82 y.o. female with medical diagnoses as listed in the medical history and problem list that presents with c/o sore throat for 2 weeks which is subsiding with left side of tongue with redness and inflammation. States also has a metallic taste to mouth. In the past has had symptoms like this and was diagnosed with trigeminal neuralgia.  Patient has noted increased hair loss in past few months which she has complained about in the past but seems to be worse.  States skin is dry, nails are brittle. Also c/o burning to both feet that may radiate to lower leg, comes and goes. First noticed the burning following a feeling of blood rushing to legs about 1.5 to 2 weeks ago.  BMI today is 26.60 and patient has gained 2 pounds since last visit despite complaint of decreased appetite.   Established patient with last clinic appointment 1/11/2018.        PAST MEDICAL HISTORY:  Past Medical History:   Diagnosis Date    Depression        PAST SURGICAL HISTORY:  Past Surgical History:   Procedure Laterality Date    APPENDECTOMY      HERNIA REPAIR      SINUS SURGERY      TONSILLECTOMY         SOCIAL HISTORY:  Social History     Social History    Marital status:      Spouse name: N/A    Number of children: N/A    Years of education: N/A     Occupational History    Not on file.     Social History Main Topics    Smoking status: Former Smoker     Quit date: 11/15/1985    Smokeless tobacco: Never Used    Alcohol use No      Comment: rare    Drug use: Unknown    Sexual activity: Not on file     Other Topics Concern    Not on file     Social History Narrative    No narrative on file       FAMILY HISTORY:  History reviewed. No  pertinent family history.    ALLERGIES AND MEDICATIONS: updated and reviewed.  Review of patient's allergies indicates:   Allergen Reactions    Celestone [betamethasone sodium phosphate] Itching     Flushing to face    Zoloft [sertraline]      Made her feel strange    Amoxicillin Anxiety     Headaches      Current Outpatient Prescriptions   Medication Sig Dispense Refill    alprazolam (XANAX) 0.25 MG tablet Take 1 tablet (0.25 mg total) by mouth daily as needed for Anxiety (take 1/2 to 1 tablet as needed). 30 tablet 0    clotrimazole-betamethasone 1-0.05% (LOTRISONE) cream Apply topically 2 (two) times daily. 60 g 0    docusate sodium (COLACE) 100 MG capsule Take 1 capsule (100 mg total) by mouth once daily. 30 capsule 0    multivit with minerals/lutein (MULTIVITAMIN 50 PLUS ORAL) Take 1 tablet by mouth.      efe-E1-xsd57-zinc--valerio-bor (CALTRATE 600+D PLUS MINERALS) 600 mg calcium- 800 unit-50 mg Tab Take 1 tablet by mouth once daily. 365 tablet 11    fluticasone (FLONASE) 50 mcg/actuation nasal spray 1 spray by Each Nare route 2 (two) times daily. 16 g 3     No current facility-administered medications for this visit.      Review of Systems   Constitutional: Positive for appetite change. Negative for chills, fatigue and fever.        Feels like appetite has been decreased, does not feel like eating most of the time.    HENT: Positive for postnasal drip, rhinorrhea, sneezing and sore throat. Negative for congestion, sinus pain and sinus pressure.         Sneezing, with post nasal drip, runny nose that is clear, sore throat that is improving, inflammation to left side of tongue with pain that has diminished, may have bitten tongue   Respiratory: Positive for cough. Negative for shortness of breath.         Slight cough, taking cough drops   Cardiovascular: Negative for chest pain, palpitations and leg swelling.   Gastrointestinal: Negative for abdominal pain, constipation, diarrhea, nausea and vomiting.  "  Endocrine:        Increased hair loss, dry skin, brittle nails   Genitourinary: Negative for difficulty urinating.   Musculoskeletal: Negative for arthralgias and myalgias.   Skin: Positive for rash. Negative for wound.        Small red area to neck,left side, possible insect bite   Neurological: Positive for numbness. Negative for dizziness and headaches.        C/O numbness and burning to bilateral feet   Hematological: Does not bruise/bleed easily.   Psychiatric/Behavioral: Positive for sleep disturbance. Negative for dysphoric mood. The patient is not nervous/anxious.         Sleep disturbed by frequent urination. Denies depression and anxiety.       Objective:       Vitals:    06/19/18 1132   BP: 139/73   BP Location: Right arm   Patient Position: Sitting   BP Method: Medium (Automatic)   Pulse: 75   Temp: 98.1 °F (36.7 °C)   TempSrc: Oral   Weight: 75.9 kg (167 lb 5.3 oz)   Height: 5' 6.5" (1.689 m)     Physical Exam   Constitutional: She is oriented to person, place, and time. Vital signs are normal. She appears well-developed and well-nourished. No distress.   HENT:   Head: Normocephalic and atraumatic.   Right Ear: Tympanic membrane, external ear and ear canal normal.   Left Ear: Tympanic membrane, external ear and ear canal normal.   Nose: Nose normal. No mucosal edema.   Mouth/Throat: Mucous membranes are normal.   Nasal mucosa pale, boggy. Tongue appears to possibly have been bitten but is healing.    Eyes: Conjunctivae and lids are normal. Pupils are equal, round, and reactive to light. Right conjunctiva is not injected. Left conjunctiva is not injected.   Cardiovascular: Normal rate, regular rhythm, S1 normal, S2 normal, normal heart sounds, intact distal pulses and normal pulses.    No murmur heard.  Pulses:       Carotid pulses are 2+ on the right side, and 2+ on the left side.       Radial pulses are 2+ on the right side, and 2+ on the left side.        Dorsalis pedis pulses are 2+ on the right " side, and 2+ on the left side.        Posterior tibial pulses are 2+ on the right side, and 2+ on the left side.   No edema   Pulmonary/Chest: Effort normal and breath sounds normal. No respiratory distress. She has no decreased breath sounds. She has no wheezes. She has no rhonchi. She has no rales.   Musculoskeletal: Normal range of motion.        Right foot: There is normal range of motion and no deformity.        Left foot: There is normal range of motion and no deformity.   Feet:   Right Foot:   Protective Sensation: 9 sites tested. 9 sites sensed.   Skin Integrity: Negative for ulcer, blister, skin breakdown, erythema, warmth, callus or dry skin.   Left Foot:   Protective Sensation: 9 sites tested. 9 sites sensed.   Skin Integrity: Negative for ulcer, blister, skin breakdown, erythema, warmth, callus or dry skin.   Lymphadenopathy:        Head (right side): No submental, no submandibular, no tonsillar, no preauricular, no posterior auricular and no occipital adenopathy present.        Head (left side): No submental, no submandibular, no tonsillar, no preauricular, no posterior auricular and no occipital adenopathy present.     She has no cervical adenopathy.   Neurological: She is alert and oriented to person, place, and time. She has normal strength.   Proprioception intact to all toes bilateral feet   Skin: Skin is warm, dry and intact. She is not diaphoretic. No pallor.        Hair distribution appears normal with no alopecia, skin turgor normal, nails appear normal on exam   Psychiatric: She has a normal mood and affect. Her speech is normal and behavior is normal. Judgment and thought content normal. Cognition and memory are normal.   Nursing note and vitals reviewed.      Assessment:       1. Allergic sinusitis    2. Hair loss    3. Dry skin    4. Brittle nails    5. Numbness in feet    6. Burning sensation of feet    7. BMI 26.0-26.9,adult        Plan:   Peg was seen today for sore  throat.    Diagnoses and all orders for this visit:    Allergic sinusitis   Patient instructed to resume daily flonase use as prescribed for sinus allergies  Hair loss  -     TSH; Future  -  TSH previously tested on 9/25/17 and was 1.912, normal  - Advised hair loss may be part of normal aging process  - Hair distribution appears normal on exam  - Recommended seeing dermatology if she feels that she is experiencing an abnormal amount of hair loss    Dry skin  -     TSH; Future  - Skin turgor normal    Brittle nails  -     TSH; Future  - Nails appear normal on exam    Numbness in feet  -     POCT Glucose, result 89, non-fasting, unlikely to have diabetes, Medicare will not pay for A1c with current diagnoses  - Monofilament and proprioception foot exam is normal    Burning sensation of feet  -     POCT Glucose, result 89, non-fasting, unlikely to have diabetes, Medicare will not pay for A1c with current diagnoses  - Monofilament and proprioception foot exam is normal    Anxiety about health   Patient has a normal physical exam, normal previous blood work results    Worried well   Reassured  BMI 26.0-26.9,adult   BMI 26.60 with 2 pound weight gain since visit 1/11/18   Maintain current diet and activity level to maintain healthy weight    Follow-up if symptoms worsen or fail to improve.

## 2018-06-20 PROBLEM — J30.9 ALLERGIC SINUSITIS: Status: ACTIVE | Noted: 2018-06-20

## 2018-07-23 ENCOUNTER — TELEPHONE (OUTPATIENT)
Dept: FAMILY MEDICINE | Facility: CLINIC | Age: 83
End: 2018-07-23

## 2018-07-23 DIAGNOSIS — R82.90 BAD ODOR OF URINE: Primary | ICD-10-CM

## 2018-07-23 NOTE — TELEPHONE ENCOUNTER
Does she need a derm referral?   The biting of the cheek and lip while eating with swelling sounds like a possible dental problem.  I can provide her with some special mouthwash that will numb the area and possible help to heal it. I am sure the more she bites it, the more swollen it gets and it is like a vicious cycle.    I have ordered a Urinalysis and culture to check the urine.   Thanks.  Noreen

## 2018-07-23 NOTE — TELEPHONE ENCOUNTER
Please call the patient. We have previously discussed her hair loss and tested her thyroid which has been normal.  At this time, I would recommend she see dermatology about the hair loss if she feels that it is an abnormal amount.   Is she having any burning or pain with urination, frequency? Other symptoms of UTI? Strong urine may be a sign that she is not taking in enough fluids, especially in the hot weather we have been having recently.   Please get more information about the cheek thickness.  I'm not sure what she means by that. How long has she had this symptom?   Thanks.  Noreen

## 2018-07-23 NOTE — TELEPHONE ENCOUNTER
----- Message from Mihai Styles sent at 7/23/2018  9:59 AM CDT -----  Type: Needs Medical Advice    Who Called:  Patient  Best Call Back Number: [phone]  Additional Information: Patient calling.  States has questions about hair loss and strong odor in urine, thickness in her left cheek.

## 2018-07-24 ENCOUNTER — LAB VISIT (OUTPATIENT)
Dept: LAB | Facility: HOSPITAL | Age: 83
End: 2018-07-24
Attending: NURSE PRACTITIONER
Payer: MEDICARE

## 2018-07-24 DIAGNOSIS — R82.90 BAD ODOR OF URINE: ICD-10-CM

## 2018-07-24 LAB
BILIRUB UR QL STRIP: NEGATIVE
CLARITY UR REFRACT.AUTO: ABNORMAL
COLOR UR AUTO: YELLOW
GLUCOSE UR QL STRIP: NEGATIVE
HGB UR QL STRIP: NEGATIVE
KETONES UR QL STRIP: NEGATIVE
LEUKOCYTE ESTERASE UR QL STRIP: NEGATIVE
NITRITE UR QL STRIP: NEGATIVE
PH UR STRIP: 5 [PH] (ref 5–8)
PROT UR QL STRIP: NEGATIVE
SP GR UR STRIP: 1.03 (ref 1–1.03)
URN SPEC COLLECT METH UR: ABNORMAL
UROBILINOGEN UR STRIP-ACNC: NEGATIVE EU/DL

## 2018-07-24 PROCEDURE — 81003 URINALYSIS AUTO W/O SCOPE: CPT

## 2018-07-24 PROCEDURE — 87086 URINE CULTURE/COLONY COUNT: CPT

## 2018-07-24 NOTE — TELEPHONE ENCOUNTER
She is requesting to  the prescription for mouthwash. Appt cancelled.     She will come in today for ua.     Medicare does not need referrals.

## 2018-07-26 LAB — BACTERIA UR CULT: NO GROWTH

## 2018-08-13 ENCOUNTER — LAB VISIT (OUTPATIENT)
Dept: LAB | Facility: HOSPITAL | Age: 83
End: 2018-08-13
Attending: PHYSICIAN ASSISTANT
Payer: MEDICARE

## 2018-08-13 ENCOUNTER — OFFICE VISIT (OUTPATIENT)
Dept: FAMILY MEDICINE | Facility: CLINIC | Age: 83
End: 2018-08-13
Payer: MEDICARE

## 2018-08-13 VITALS
SYSTOLIC BLOOD PRESSURE: 128 MMHG | TEMPERATURE: 98 F | BODY MASS INDEX: 26.57 KG/M2 | WEIGHT: 169.31 LBS | DIASTOLIC BLOOD PRESSURE: 72 MMHG | OXYGEN SATURATION: 96 % | HEART RATE: 93 BPM | HEIGHT: 67 IN

## 2018-08-13 DIAGNOSIS — Z87.898 HX OF LEFT FLANK PAIN: ICD-10-CM

## 2018-08-13 DIAGNOSIS — R82.90 FOUL SMELLING URINE: ICD-10-CM

## 2018-08-13 DIAGNOSIS — Z87.898 HX OF LEFT FLANK PAIN: Primary | ICD-10-CM

## 2018-08-13 LAB
BACTERIA #/AREA URNS AUTO: NORMAL /HPF
BILIRUB UR QL STRIP: NEGATIVE
CLARITY UR REFRACT.AUTO: ABNORMAL
COLOR UR AUTO: YELLOW
GLUCOSE UR QL STRIP: NEGATIVE
HGB UR QL STRIP: ABNORMAL
KETONES UR QL STRIP: NEGATIVE
LEUKOCYTE ESTERASE UR QL STRIP: ABNORMAL
MICROSCOPIC COMMENT: NORMAL
NITRITE UR QL STRIP: NEGATIVE
NON-SQ EPI CELLS #/AREA URNS AUTO: <1 /HPF
PH UR STRIP: 5 [PH] (ref 5–8)
PROT UR QL STRIP: NEGATIVE
RBC #/AREA URNS AUTO: 4 /HPF (ref 0–4)
SP GR UR STRIP: 1.02 (ref 1–1.03)
SQUAMOUS #/AREA URNS AUTO: 1 /HPF
URN SPEC COLLECT METH UR: ABNORMAL
UROBILINOGEN UR STRIP-ACNC: NEGATIVE EU/DL
WBC #/AREA URNS AUTO: 1 /HPF (ref 0–5)

## 2018-08-13 PROCEDURE — 99999 PR PBB SHADOW E&M-EST. PATIENT-LVL III: CPT | Mod: PBBFAC,,, | Performed by: PHYSICIAN ASSISTANT

## 2018-08-13 PROCEDURE — 99213 OFFICE O/P EST LOW 20 MIN: CPT | Mod: PBBFAC,PO | Performed by: PHYSICIAN ASSISTANT

## 2018-08-13 PROCEDURE — 99213 OFFICE O/P EST LOW 20 MIN: CPT | Mod: S$PBB,,, | Performed by: PHYSICIAN ASSISTANT

## 2018-08-13 PROCEDURE — 81001 URINALYSIS AUTO W/SCOPE: CPT

## 2018-08-13 NOTE — PROGRESS NOTES
Subjective:       Patient ID: Peg Moreno is a 82 y.o. female.    Chief Complaint: Back Pain (lower left side for several months, which ended this past sunday )    HPI   L lower back pain 3 days ago  Lasted 2 days  Pt says she has discolored urine with strong smell  As soon as pain went away the urine has returned to normal  Review of Systems   Constitutional: Negative.  Negative for activity change, appetite change, chills, diaphoresis, fatigue, fever and unexpected weight change.   HENT: Negative.    Eyes: Negative.    Respiratory: Negative.  Negative for cough.    Cardiovascular: Negative.  Negative for chest pain and leg swelling.   Gastrointestinal: Negative.    Endocrine: Negative.    Genitourinary: Positive for flank pain.   Musculoskeletal: Positive for back pain.   Skin: Negative.  Negative for rash.       Objective:      Physical Exam   Constitutional: She appears well-developed and well-nourished. No distress.   HENT:   Head: Normocephalic and atraumatic.   Eyes: Conjunctivae are normal. No scleral icterus.   Neck: Normal range of motion. Neck supple. No tracheal deviation present. No thyromegaly present.   Abdominal: Soft. She exhibits no distension and no mass. There is no tenderness. There is no rebound and no guarding.   No CVA tenderness   Musculoskeletal: She exhibits no edema.   Lymphadenopathy:     She has no cervical adenopathy.   Skin: Skin is warm and dry. No rash noted.   Vitals reviewed.      Assessment:       1. Hx of left flank pain    2. Foul smelling urine        Plan:       Hx of left flank pain  -     Urinalysis; Future; Expected date: 08/13/2018  -     US Abdomen Complete; Future; Expected date: 08/13/2018    Foul smelling urine  -     Urinalysis; Future; Expected date: 08/13/2018  -     US Abdomen Complete; Future; Expected date: 08/13/2018    hydrate well  F/u if sx return

## 2018-08-15 ENCOUNTER — HOSPITAL ENCOUNTER (OUTPATIENT)
Dept: RADIOLOGY | Facility: CLINIC | Age: 83
Discharge: HOME | End: 2018-08-15
Attending: PHYSICIAN ASSISTANT
Payer: MEDICARE

## 2018-08-15 DIAGNOSIS — Z87.898 HX OF LEFT FLANK PAIN: ICD-10-CM

## 2018-08-15 DIAGNOSIS — R82.90 FOUL SMELLING URINE: ICD-10-CM

## 2018-08-15 PROCEDURE — 76700 US EXAM ABDOM COMPLETE: CPT | Mod: TC,PO

## 2018-08-15 PROCEDURE — 76700 US EXAM ABDOM COMPLETE: CPT | Mod: 26,,, | Performed by: RADIOLOGY

## 2018-08-16 ENCOUNTER — TELEPHONE (OUTPATIENT)
Dept: FAMILY MEDICINE | Facility: CLINIC | Age: 83
End: 2018-08-16

## 2018-08-16 NOTE — TELEPHONE ENCOUNTER
Called pt regarding below message. Pt wanted to clarify that stones are in gallbladder. Informed pt yes she has multiple gallstones see within the gallbladder. Pt states she has seen gen surg in the past for a separate issue and is requesting if she needed surgery could she see that provider again. Informed pt yes as long has he is able to perform that type of surgery then she may proceed with him as her surgeon. Pt verbalized understanding with no further questions.     ----- Message from Vanita Arreguin sent at 8/16/2018  1:55 PM CDT -----  Type:  Test Results    Who Called:  Self Name of Test (Lab/Mammo/Etc):  ultrasound  Date of Test:  08/15/2018  Ordering Provider:  JOSE ENRIQUE Vila Where the test was performed:  [facility]   Best Call Back Number:  [phone]  Additional Information:

## 2018-08-16 NOTE — TELEPHONE ENCOUNTER
Called pt regarding below message. Informed pt of results and recommendations per José Miguel PA. Pt verbalized understanding with no further questions.

## 2018-08-16 NOTE — TELEPHONE ENCOUNTER
Please call and notify Pt. That she has a lot of stones in her Gallbladder, otherwise the ultrasound is normal   Pt needs to f/u if she is still having problems

## 2018-10-24 ENCOUNTER — HOSPITAL ENCOUNTER (OUTPATIENT)
Dept: RADIOLOGY | Facility: CLINIC | Age: 83
Discharge: HOME | End: 2018-10-24
Attending: NURSE PRACTITIONER
Payer: MEDICARE

## 2018-10-24 DIAGNOSIS — Z12.39 ENCOUNTER FOR SPECIAL SCREENING EXAMINATION FOR NEOPLASM OF BREAST: ICD-10-CM

## 2018-10-24 PROCEDURE — 77067 SCR MAMMO BI INCL CAD: CPT | Mod: TC,PO

## 2018-10-24 PROCEDURE — 77067 SCR MAMMO BI INCL CAD: CPT | Mod: 26,,, | Performed by: RADIOLOGY

## 2018-10-24 PROCEDURE — 77063 BREAST TOMOSYNTHESIS BI: CPT | Mod: 26,,, | Performed by: RADIOLOGY

## 2018-10-24 PROCEDURE — 77063 BREAST TOMOSYNTHESIS BI: CPT | Mod: TC,PO

## 2018-10-30 ENCOUNTER — IMMUNIZATION (OUTPATIENT)
Dept: FAMILY MEDICINE | Facility: CLINIC | Age: 83
End: 2018-10-30
Payer: MEDICARE

## 2018-10-30 PROCEDURE — 90662 IIV NO PRSV INCREASED AG IM: CPT | Mod: PBBFAC,PO

## 2019-01-14 ENCOUNTER — DOCUMENTATION ONLY (OUTPATIENT)
Dept: FAMILY MEDICINE | Facility: CLINIC | Age: 84
End: 2019-01-14

## 2019-01-14 NOTE — PROGRESS NOTES
Pre-Visit Chart Review  For Appointment Scheduled on 01/15/2019    Health Maintenance Due   Topic Date Due    TETANUS VACCINE  10/15/1953    Zoster Vaccine  10/15/1995

## 2019-01-15 ENCOUNTER — OFFICE VISIT (OUTPATIENT)
Dept: FAMILY MEDICINE | Facility: CLINIC | Age: 84
End: 2019-01-15
Payer: MEDICARE

## 2019-01-15 VITALS
BODY MASS INDEX: 27.12 KG/M2 | HEIGHT: 67 IN | WEIGHT: 172.81 LBS | DIASTOLIC BLOOD PRESSURE: 72 MMHG | SYSTOLIC BLOOD PRESSURE: 130 MMHG | RESPIRATION RATE: 16 BRPM | HEART RATE: 73 BPM | OXYGEN SATURATION: 98 %

## 2019-01-15 DIAGNOSIS — R22.1 NECK MASS: ICD-10-CM

## 2019-01-15 DIAGNOSIS — L65.9 HAIR LOSS: ICD-10-CM

## 2019-01-15 DIAGNOSIS — K11.8 SUBMANDIBULAR GLAND MASS: Primary | ICD-10-CM

## 2019-01-15 DIAGNOSIS — K13.79 SOFT PALATE EDEMA: ICD-10-CM

## 2019-01-15 PROCEDURE — 99214 PR OFFICE/OUTPT VISIT, EST, LEVL IV, 30-39 MIN: ICD-10-PCS | Mod: S$GLB,,, | Performed by: INTERNAL MEDICINE

## 2019-01-15 PROCEDURE — 99214 OFFICE O/P EST MOD 30 MIN: CPT | Mod: S$GLB,,, | Performed by: INTERNAL MEDICINE

## 2019-01-15 NOTE — PROGRESS NOTES
Subjective:       Patient ID: Peg Moreno is a 83 y.o. female.    Chief Complaint: Annual Exam; Hair Loss; scalp itching; and face and neck pain    HPI     CHIEF :COMPLAINT:  Hair loss and scalp itching  HPI:  ONSET:      4 y  ago.  Itching for 1 y.   DURATION: . constant  QUALITY/COURSE: .worse  LOCATION:   INTENSITY/SEVERITY: # 8/10 (on 1 to 10 scale)  CONTEXT/WHEN:          MODIFIERS/TREATMENTS: Taking Medications: ? .     Compliant with Taking Prescribed Medications: ? yes  . Prior X-Rays: no  Prior Labs: no    Movements, medications or activities that worsen the problem:       Movements, medications, or activities that ease the problem:      The below section is positive for the patient ONLY if BOLDED:    SYMPTOMS/RELATED:     CHIEF :COMPLAINT:  Facial pain  HPI:  History trigeminal neuralgia.  Feels that the soft palate is hard on the left side but not the right.  No longer has dysphagia which she had for years ago.  ONSET:        ago.   DURATION: . intermittant  QUALITY/COURSE: .unchanged   LOCATION:   INTENSITY/SEVERITY: # 5/10 (on 1 to 10 scale)  CONTEXT/WHEN:          MODIFIERS/TREATMENTS: Taking Medications: ? .     Compliant with Taking Prescribed Medications: ? yes  . Prior X-Rays: no  Prior Labs: no    Movements, medications or activities that worsen the problem:       Movements, medications, or activities that ease the problem:      The below section is positive for the patient ONLY if BOLDED:    SYMPTOMS/RELATED:          CHIEF COMPLAINT: Neck Pain(+).  HPI: neck pain 5 y ago.     ONSET/TIMING: Trauma: no. . Onset    5 y    ago. . Work related: no .    Similar problems  in past: no .    DURATION:      QUALITY/COURSE:    unchanged .       LOCATION:   bilat. Radiation: no.     INTENSITY/SEVERITY: Severity is # 5 (10 point scale).      SYMPTOMS/RELATED: .-Possible medication side effects include:     The following symptoms are positive if BOLD, negative otherwise.      CONTEXT/WHEN:  "--Activity. Coughing. Sneeze.. Working_[employer].  . Repetitive_work . Hx of CA. history of IV drug abuse.. Similar_problems.     MODIFIERS/TREATMENTS: . Taking medications. Tylenol aleve.    Chiropractor.  Litigation_pending. c-spine_x-rays. CT. MRI. Surgery.     REVIEW OF SYMPTOMS:  . Arm_Pain_to_below _elbow . .Weight_loss.            Review of Systems      Objective:      Vitals:    01/15/19 1034   BP: 130/72   Pulse: 73   Resp: 16   SpO2: 98%   Weight: 78.4 kg (172 lb 13.5 oz)   Height: 5' 6.5" (1.689 m)   PainSc: 0-No pain     Physical Exam   Constitutional: She appears well-developed and well-nourished.   HENT:   2.5 by 1.5 cm mass in the left some did  area which is mildly tender.  Is firm and mobile.  The tonsillar arches more prominent on the left than the right.  It does look normal though.   Cardiovascular: Normal rate, regular rhythm and normal heart sounds.   Pulmonary/Chest: Effort normal and breath sounds normal.   Abdominal: Soft. There is no tenderness.   Neurological: She is alert.   Skin:   Diffuse hair loss with no cicatrizing areas of the scalp.   Psychiatric: She has a normal mood and affect. Her behavior is normal. Thought content normal.   Nursing note and vitals reviewed.        Assessment:       1. Submandibular gland mass    2. Soft palate edema    3. Hair loss    4. Neck mass          Plan:       Submandibular gland mass  -     Ambulatory referral to ENT  -     CT Soft Tissue Neck W WO Contrast; Future; Expected date: 01/15/2019  -     Basic metabolic panel; Future; Expected date: 01/15/2019    Soft palate edema  -     Ambulatory referral to ENT    Hair loss  -     Ambulatory consult to Dermatology    Neck mass  -     Ambulatory referral to ENT  -     CT Soft Tissue Neck W WO Contrast; Future; Expected date: 01/15/2019      Follow-up in about 6 weeks (around 2/26/2019).      "

## 2019-01-16 ENCOUNTER — HOSPITAL ENCOUNTER (OUTPATIENT)
Dept: RADIOLOGY | Facility: HOSPITAL | Age: 84
Discharge: HOME | End: 2019-01-16
Attending: INTERNAL MEDICINE
Payer: MEDICARE

## 2019-01-16 DIAGNOSIS — K11.8 SUBMANDIBULAR GLAND MASS: ICD-10-CM

## 2019-01-16 DIAGNOSIS — R22.1 NECK MASS: ICD-10-CM

## 2019-01-16 PROCEDURE — 70491 CT SOFT TISSUE NECK WITH CONTRAST: ICD-10-PCS | Mod: 26,,, | Performed by: RADIOLOGY

## 2019-01-16 PROCEDURE — 70491 CT SOFT TISSUE NECK W/DYE: CPT | Mod: 26,,, | Performed by: RADIOLOGY

## 2019-01-16 PROCEDURE — 25500020 PHARM REV CODE 255

## 2019-01-16 PROCEDURE — 70491 CT SOFT TISSUE NECK W/DYE: CPT | Mod: TC

## 2019-01-16 RX ADMIN — IOHEXOL 75 ML: 350 INJECTION, SOLUTION INTRAVENOUS at 12:01

## 2019-01-17 ENCOUNTER — INITIAL CONSULT (OUTPATIENT)
Dept: DERMATOLOGY | Facility: CLINIC | Age: 84
End: 2019-01-17
Payer: MEDICARE

## 2019-01-17 DIAGNOSIS — L66.1 FRONTAL FIBROSING ALOPECIA: Primary | ICD-10-CM

## 2019-01-17 PROCEDURE — 99213 OFFICE O/P EST LOW 20 MIN: CPT | Mod: PBBFAC,PO | Performed by: DERMATOLOGY

## 2019-01-17 PROCEDURE — 99999 PR PBB SHADOW E&M-EST. PATIENT-LVL III: ICD-10-PCS | Mod: PBBFAC,,, | Performed by: DERMATOLOGY

## 2019-01-17 PROCEDURE — 99201 PR OFFICE/OUTPT VISIT,NEW,LEVL I: ICD-10-PCS | Mod: S$PBB,,, | Performed by: DERMATOLOGY

## 2019-01-17 PROCEDURE — 99999 PR PBB SHADOW E&M-EST. PATIENT-LVL III: CPT | Mod: PBBFAC,,, | Performed by: DERMATOLOGY

## 2019-01-17 PROCEDURE — 99201 PR OFFICE/OUTPT VISIT,NEW,LEVL I: CPT | Mod: S$PBB,,, | Performed by: DERMATOLOGY

## 2019-01-17 RX ORDER — BETAMETHASONE DIPROPIONATE 0.5 MG/G
LOTION TOPICAL
Qty: 60 ML | Refills: 1 | Status: SHIPPED | OUTPATIENT
Start: 2019-01-17 | End: 2020-12-03

## 2019-01-17 NOTE — PROGRESS NOTES
"  Subjective:       Patient ID:  Peg Moreno is a 83 y.o. female who presents for   Chief Complaint   Patient presents with    Hair Loss     Scalp-Itchy and dry     Initial visit    H/o depression and anxieties since losing her  4 years ago  "that's when the hair started falling out"  C/o hair thinning, receeding hairline  Scalp is itchy          Hair Loss  - Initial  Affected locations: scalp  Duration: 5 years  Signs / symptoms: itching and dryness  Severity: mild  Timing: constant  Aggravated by: nothing  Relieving factors/Treatments tried: nothing        Review of Systems   Constitutional: Negative for fever, chills and fatigue.   Skin: Positive for daily sunscreen use, activity-related sunscreen use and wears hat.   Hematologic/Lymphatic: Does not bruise/bleed easily.        Objective:    Physical Exam   Constitutional: She appears well-developed and well-nourished. No distress.   Neurological: She is alert and oriented to person, place, and time. She is not disoriented.   Psychiatric: She has a normal mood and affect.   Skin:   Areas Examined (abnormalities noted in diagram):   Scalp / Hair Palpated and Inspected  Head / Face Inspection Performed              Diagram Legend     Erythematous scaling macule/papule c/w actinic keratosis       Vascular papule c/w angioma      Pigmented verrucoid papule/plaque c/w seborrheic keratosis      Yellow umbilicated papule c/w sebaceous hyperplasia      Irregularly shaped tan macule c/w lentigo     1-2 mm smooth white papules consistent with Milia      Movable subcutaneous cyst with punctum c/w epidermal inclusion cyst      Subcutaneous movable cyst c/w pilar cyst      Firm pink to brown papule c/w dermatofibroma      Pedunculated fleshy papule(s) c/w skin tag(s)      Evenly pigmented macule c/w junctional nevus     Mildly variegated pigmented, slightly irregular-bordered macule c/w mildly atypical nevus      Flesh colored to evenly pigmented papule " c/w intradermal nevus       Pink pearly papule/plaque c/w basal cell carcinoma      Erythematous hyperkeratotic cursted plaque c/w SCC      Surgical scar with no sign of skin cancer recurrence      Open and closed comedones      Inflammatory papules and pustules      Verrucoid papule consistent consistent with wart     Erythematous eczematous patches and plaques     Dystrophic onycholytic nail with subungual debris c/w onychomycosis     Umbilicated papule    Erythematous-base heme-crusted tan verrucoid plaque consistent with inflamed seborrheic keratosis     Erythematous Silvery Scaling Plaque c/w Psoriasis     See annotation                  Assessment / Plan:        Frontal fibrosing alopecia  -     betamethasone dipropionate (DIPROLENE) 0.05 % lotion; Thin film to AA anterior scalp nightly  Dispense: 60 mL; Refill: 1    discussed all the current available tx options  None FDA approved  ILK  Doxycycline  Plaquenil   finasteride    Patient declines injection or systemic medications  Will try topical steroid daily and minoxidil 5% daily           No Follow-up on file.

## 2019-01-17 NOTE — LETTER
January 17, 2019      Sandro Carlos MD  [address]  [place] [address]           [place] [facility]  [address]  [place] [address]  Phone: [phone]          Patient: Peg Moreno   MR Number: [id number]   YOB: 1935   Date of Visit: 1/17/2019       Dear Dr. Sandro Carlos:    Thank you for referring Peg Moreno to me for evaluation. Attached you will find relevant portions of my assessment and plan of care.    If you have questions, please do not hesitate to call me. I look forward to following Peg Moreno along with you.    Sincerely,    Diane Monzon MD    Enclosure  CC:  No Recipients    If you would like to receive this communication electronically, please contact [email or web address] or [phone] to request more information on [facility] [facility] [facility] access.    For providers and/or their staff who would like to refer a patient to [facility], please contact us through our one-stop-shop provider referral line, [facility], at [phone].    If you feel you have received this communication in error or would no longer like to receive these types of communications, please e-mail [email or web address]

## 2019-04-15 ENCOUNTER — DOCUMENTATION ONLY (OUTPATIENT)
Dept: FAMILY MEDICINE | Facility: CLINIC | Age: 84
End: 2019-04-15

## 2019-04-15 ENCOUNTER — OFFICE VISIT (OUTPATIENT)
Dept: FAMILY MEDICINE | Facility: CLINIC | Age: 84
End: 2019-04-15
Payer: MEDICARE

## 2019-04-15 VITALS
BODY MASS INDEX: 27.12 KG/M2 | HEART RATE: 70 BPM | TEMPERATURE: 98 F | WEIGHT: 172.81 LBS | SYSTOLIC BLOOD PRESSURE: 130 MMHG | DIASTOLIC BLOOD PRESSURE: 84 MMHG | RESPIRATION RATE: 16 BRPM | OXYGEN SATURATION: 98 % | HEIGHT: 67 IN

## 2019-04-15 DIAGNOSIS — J30.89 NON-SEASONAL ALLERGIC RHINITIS, UNSPECIFIED TRIGGER: Primary | ICD-10-CM

## 2019-04-15 DIAGNOSIS — I70.0 AORTIC ATHEROSCLEROSIS: ICD-10-CM

## 2019-04-15 DIAGNOSIS — J01.80 OTHER SUBACUTE SINUSITIS: ICD-10-CM

## 2019-04-15 PROCEDURE — 99213 OFFICE O/P EST LOW 20 MIN: CPT | Mod: S$GLB,,, | Performed by: INTERNAL MEDICINE

## 2019-04-15 PROCEDURE — 99213 PR OFFICE/OUTPT VISIT, EST, LEVL III, 20-29 MIN: ICD-10-PCS | Mod: S$GLB,,, | Performed by: INTERNAL MEDICINE

## 2019-04-15 RX ORDER — TRIAMTERENE/HYDROCHLOROTHIAZID 37.5-25 MG
1 TABLET ORAL DAILY
Qty: 30 TABLET | Refills: 11 | Status: SHIPPED | OUTPATIENT
Start: 2019-04-15 | End: 2019-04-15

## 2019-04-15 RX ORDER — DOXYCYCLINE 100 MG/1
100 CAPSULE ORAL EVERY 12 HOURS
Qty: 10 CAPSULE | Refills: 0 | Status: SHIPPED | OUTPATIENT
Start: 2019-04-15 | End: 2020-12-03

## 2019-04-15 RX ORDER — FLUTICASONE PROPIONATE 50 MCG
2 SPRAY, SUSPENSION (ML) NASAL DAILY
Qty: 16 G | Refills: 11 | Status: SHIPPED | OUTPATIENT
Start: 2019-04-15 | End: 2020-12-03

## 2019-04-15 NOTE — PROGRESS NOTES
"Subjective:       Patient ID: Peg Moreno is a 83 y.o. female.    Chief Complaint: Sinus Problem and Sore Throat (mucus)    HPI         CHIEF COMPLAINT: Sore_Throat(+).  HPI: post-nasal drip    ONSET/TIMING:   Started    21 d  ago.  Similar problems in past: yes      DURATION:    continuous    QUALITY/COURSE:    .   unchanged    LOCATION:  Bilateral no pain.     INTENSITY  SEVERITY: # 4 / 10 (on 1 to 10 scale).    CONTEXT/WHEN:   Similar problems: no. Physician visits:  no . Exposure_to_others_with_similar_symptoms: no  Exposure_to_others_with_similar_documented strep throat: no  MODIFIERS/TREATMENTS: . Analgesics: yes   Antibiotics: no.        The following symptoms are positive if BOLDED, otherwise negative.        SYMPTOMS/RELATED:  Lymphadenopathy.Voice_Change.    Heartburn .     The patient has occasional anxiety and would like Xanax.    Review of Systems   Constitutional: Negative for fever.   HENT: Positive for postnasal drip and sore throat. Negative for ear pain, rhinorrhea, sneezing and trouble swallowing.    Respiratory: Negative for cough.    Neurological: Negative for headaches.       Previous CT showed aortic calcifications  Objective:      Vitals:    04/15/19 1422   BP: 130/84   Pulse: 70   Resp: 16   Temp: 98 °F (36.7 °C)   TempSrc: Oral   SpO2: 98%   Weight: 78.4 kg (172 lb 13.5 oz)   Height: 5' 6.5" (1.689 m)   PainSc:   4   PainLoc: Throat     Physical Exam   Constitutional: She appears well-developed and well-nourished.   HENT:   Right Ear: External ear normal.   Left Ear: External ear normal.   Mouth/Throat: Oropharynx is clear and moist. No oropharyngeal exudate.   Cardiovascular: Normal rate, regular rhythm and normal heart sounds. Exam reveals no friction rub.   No murmur heard.  Pulmonary/Chest: Effort normal and breath sounds normal. No respiratory distress. She has no wheezes. She has no rales. She exhibits no tenderness.   Abdominal: Soft. Bowel sounds are normal. There is no " tenderness.   Musculoskeletal: She exhibits no edema.   Lymphadenopathy:     She has no cervical adenopathy.   Nursing note and vitals reviewed.        Assessment:       1. Non-seasonal allergic rhinitis, unspecified trigger    2. Other subacute sinusitis          Plan:   Advised the patient that Xanax is potentially dangerous at her age and recommended against it.  Non-seasonal allergic rhinitis, unspecified trigger  -     fluticasone (FLONASE) 50 mcg/actuation nasal spray; 2 sprays (100 mcg total) by Each Nare route once daily.  Dispense: 16 g; Refill: 11    Other subacute sinusitis  -     doxycycline (VIBRAMYCIN) 100 MG Cap; Take 1 capsule (100 mg total) by mouth every 12 (twelve) hours.  Dispense: 10 capsule; Refill: 0    Other orders  -     Discontinue: triamterene-hydrochlorothiazide 37.5-25 mg (MAXZIDE-25) 37.5-25 mg per tablet; Take 1 tablet by mouth once daily.  Dispense: 30 tablet; Refill: 11      Follow up for if you are not better return in 2 weeks.

## 2019-04-15 NOTE — PATIENT INSTRUCTIONS
Please use saline nasal spray before using the Flonase or Afrin.  Use the Afrin for maximum of 3 days

## 2019-04-15 NOTE — PROGRESS NOTES
Health Maintenance Due   Topic Date Due    TETANUS VACCINE  10/15/1953    Zoster Vaccine  10/15/1995

## 2019-10-09 NOTE — TELEPHONE ENCOUNTER
Patient will go to external derm provider due to availability. Appt made to check for problem inside of cheek she says that she bites while chewing. Also some lip swelling. She denies any uti symptoms-drinks plenty pf water. Urine is dark and has foul odor. Do you want to check ua and c&s?   [facility]

## 2020-10-05 ENCOUNTER — PATIENT MESSAGE (OUTPATIENT)
Dept: ADMINISTRATIVE | Facility: HOSPITAL | Age: 85
End: 2020-10-05

## 2020-12-03 ENCOUNTER — OFFICE VISIT (OUTPATIENT)
Dept: FAMILY MEDICINE | Facility: CLINIC | Age: 85
End: 2020-12-03
Payer: MEDICARE

## 2020-12-03 VITALS
WEIGHT: 173.75 LBS | OXYGEN SATURATION: 99 % | BODY MASS INDEX: 27.27 KG/M2 | TEMPERATURE: 99 F | DIASTOLIC BLOOD PRESSURE: 72 MMHG | HEIGHT: 67 IN | HEART RATE: 73 BPM | RESPIRATION RATE: 18 BRPM | SYSTOLIC BLOOD PRESSURE: 126 MMHG

## 2020-12-03 DIAGNOSIS — E78.5 HYPERLIPIDEMIA, UNSPECIFIED HYPERLIPIDEMIA TYPE: ICD-10-CM

## 2020-12-03 DIAGNOSIS — E55.9 VITAMIN D DEFICIENCY: ICD-10-CM

## 2020-12-03 DIAGNOSIS — D69.6 THROMBOCYTOPENIA, UNSPECIFIED: ICD-10-CM

## 2020-12-03 DIAGNOSIS — I70.0 AORTIC ATHEROSCLEROSIS: ICD-10-CM

## 2020-12-03 DIAGNOSIS — K64.9 HEMORRHOIDS, UNSPECIFIED HEMORRHOID TYPE: ICD-10-CM

## 2020-12-03 DIAGNOSIS — M85.851 OSTEOPENIA OF BOTH HIPS: Primary | ICD-10-CM

## 2020-12-03 DIAGNOSIS — M85.852 OSTEOPENIA OF BOTH HIPS: Primary | ICD-10-CM

## 2020-12-03 PROCEDURE — 99214 PR OFFICE/OUTPT VISIT, EST, LEVL IV, 30-39 MIN: ICD-10-PCS | Mod: S$PBB,,, | Performed by: INTERNAL MEDICINE

## 2020-12-03 PROCEDURE — 99214 OFFICE O/P EST MOD 30 MIN: CPT | Mod: S$PBB,,, | Performed by: INTERNAL MEDICINE

## 2020-12-03 PROCEDURE — 99999 PR PBB SHADOW E&M-EST. PATIENT-LVL IV: CPT | Mod: PBBFAC,,, | Performed by: INTERNAL MEDICINE

## 2020-12-03 PROCEDURE — 99214 OFFICE O/P EST MOD 30 MIN: CPT | Mod: PBBFAC,PN | Performed by: INTERNAL MEDICINE

## 2020-12-03 PROCEDURE — 99999 PR PBB SHADOW E&M-EST. PATIENT-LVL IV: ICD-10-PCS | Mod: PBBFAC,,, | Performed by: INTERNAL MEDICINE

## 2020-12-03 RX ORDER — CLOTRIMAZOLE AND BETAMETHASONE DIPROPIONATE 10; .64 MG/G; MG/G
CREAM TOPICAL 2 TIMES DAILY
Qty: 60 G | Refills: 0 | Status: SHIPPED | OUTPATIENT
Start: 2020-12-03 | End: 2024-03-08 | Stop reason: SDUPTHER

## 2020-12-03 NOTE — PROGRESS NOTES
Assessment and Plan:    1. Osteopenia of both hips  Repeat DEXA due.   - DXA Bone Density Spine And Hip; Future    2. Vitamin D deficiency  Discussed adding otc vitamin D as per patient instructions.    3. Hyperlipidemia, unspecified hyperlipidemia type  Reviewed outside labs from June with . Discussed possible benefit of statin, especially with history of aortic atherosclerosis, but she has declined.     4. Aortic atherosclerosis  Declined statin as above. Noted on prior imaging.     5. Thrombocytopenia, unspecified  Had been stable to slightly improved on last outside labs. Repeat next year. No symptoms.     6. Hemorrhoids, unspecified hemorrhoid type  - clotrimazole-betamethasone 1-0.05% (LOTRISONE) cream; Apply topically 2 (two) times daily.  Dispense: 60 g; Refill: 0      ______________________________________________________________________  Subjective:    Chief Complaint:  Establish care    HPI:  Peg is a 85 y.o. year old woman here to establish care. She had been living in [place] for the last year and a half.     She has a history of anxiety and depression. She lately has been taking only xanax PRN. Taking 1/2 of a 0.25 mg pill a few times per month. She does not need a refill right now.     Osteopenia- Noted on prior DEXA in 2017, at the time had low risk of fracture and has not been on medication. She does not take a calcium supplement personally but has    Aortic atherosclerosis- Noted on prior imaging including US abdomen. She has been on fish oil    Thrombocytopenia- Stable on last 2 CBCs, last in 2018. No bleeding. Has microcytosis as well but normal iron studies and no anemia.     Allergic rhinitis- She not taking anything currently for this.     She takes advil rarely for arthritis pain. Uses aspercreme topically and this is helpful.     Past Medical History:  Past Medical History:   Diagnosis Date    Depression        Past Surgical History:  Past Surgical History:   Procedure  Laterality Date    APPENDECTOMY      HERNIA REPAIR      SINUS SURGERY      TONSILLECTOMY         Family History:  Family History   Problem Relation Age of Onset    Melanoma Neg Hx     Psoriasis Neg Hx     Lupus Neg Hx     Eczema Neg Hx        Social History:  Social History     Socioeconomic History    Marital status:      Spouse name: Not on file    Number of children: Not on file    Years of education: Not on file    Highest education level: Not on file   Occupational History    Not on file   Social Needs    Financial resource strain: Not on file    Food insecurity     Worry: Not on file     Inability: Not on file    Transportation needs     Medical: Not on file     Non-medical: Not on file   Tobacco Use    Smoking status: Former Smoker     Quit date: 11/15/1985     Years since quittin.0    Smokeless tobacco: Never Used   Substance and Sexual Activity    Alcohol use: No     Comment: rare    Drug use: Not on file    Sexual activity: Not on file   Lifestyle    Physical activity     Days per week: Not on file     Minutes per session: Not on file    Stress: Not on file   Relationships    Social connections     Talks on phone: Not on file     Gets together: Not on file     Attends Orthodoxy service: Not on file     Active member of club or organization: Not on file     Attends meetings of clubs or organizations: Not on file     Relationship status: Not on file   Other Topics Concern    Are you pregnant or think you may be? Not Asked    Breast-feeding Not Asked   Social History Narrative    Not on file       Medications:  Current Outpatient Medications on File Prior to Visit   Medication Sig Dispense Refill    alprazolam (XANAX) 0.25 MG tablet Take 1 tablet (0.25 mg total) by mouth daily as needed for Anxiety (take 1/2 to 1 tablet as needed). 30 tablet 0    multivit with minerals/lutein (MULTIVITAMIN 50 PLUS ORAL) Take 1 tablet by mouth.      vit A/vit C/vit E/zinc/copper  (PRESERVISION AREDS ORAL) Take 1 tablet by mouth once daily.      [DISCONTINUED] betamethasone dipropionate (DIPROLENE) 0.05 % lotion Thin film to AA anterior scalp nightly (Patient not taking: Reported on 12/3/2020) 60 mL 1    [DISCONTINUED] zbs-[id number]-zinc--valerio-bor (CALTRATE 600+D PLUS MINERALS) 600 mg calcium- 800 unit-50 mg Tab Take 1 tablet by mouth once daily. (Patient not taking: Reported on 12/3/2020) 365 tablet 11    [DISCONTINUED] clotrimazole-betamethasone 1-0.05% (LOTRISONE) cream Apply topically 2 (two) times daily. (Patient not taking: Reported on 12/3/2020) 60 g 0    [DISCONTINUED] docusate sodium (COLACE) 100 MG capsule Take 1 capsule (100 mg total) by mouth once daily. (Patient not taking: Reported on 12/3/2020) 30 capsule 0    [DISCONTINUED] doxycycline (VIBRAMYCIN) 100 MG Cap Take 1 capsule (100 mg total) by mouth every 12 (twelve) hours. (Patient not taking: Reported on 12/3/2020) 10 capsule 0    [DISCONTINUED] fluticasone (FLONASE) 50 mcg/actuation nasal spray 1 spray by Each Nare route 2 (two) times daily. (Patient not taking: Reported on 12/3/2020) 16 g 3    [DISCONTINUED] fluticasone (FLONASE) 50 mcg/actuation nasal spray 2 sprays (100 mcg total) by Each Nare route once daily. (Patient not taking: Reported on 12/3/2020) 16 g 11     No current facility-administered medications on file prior to visit.        Allergies:  Celestone [betamethasone sodium phosphate], Zoloft [sertraline], and Amoxicillin    Immunizations:  Immunization History   Administered Date(s) Administered    Influenza - High Dose - PF (65 years and older) 10/31/2014, 10/15/2015, 10/01/2016, 09/22/2017, 10/30/2018, 10/25/2019    Pneumococcal Conjugate - 13 Valent 01/11/2018    Pneumococcal Polysaccharide - 23 Valent 01/11/2006       Review of Systems:  Review of Systems   Constitutional: Negative for chills, fever and unexpected weight change.   HENT: Negative for ear pain and trouble swallowing.    Eyes:  "Negative for pain and visual disturbance.   Respiratory: Negative for shortness of breath and wheezing.    Cardiovascular: Negative for chest pain and leg swelling.   Gastrointestinal: Negative for nausea and vomiting.   Endocrine: Negative for polydipsia and polyuria.   Genitourinary: Negative for difficulty urinating and hematuria.   Musculoskeletal: Positive for arthralgias. Negative for gait problem and joint swelling.   Skin: Negative for rash and wound.   Allergic/Immunologic: Positive for environmental allergies. Negative for food allergies.   Neurological: Negative for dizziness and light-headedness.   Psychiatric/Behavioral: Negative for dysphoric mood. The patient is not nervous/anxious.        Objective:    Vitals:  Vitals:    12/03/20 1012   BP: 126/72   Pulse: 73   Resp: 18   Temp: 98.5 °F (36.9 °C)   TempSrc: Oral   SpO2: 99%   Weight: 78.8 kg (173 lb 11.6 oz)   Height: 5' 6.5" (1.689 m)   PainSc:   5   PainLoc: Generalized       Physical Exam  Vitals signs reviewed.   Constitutional:       General: She is not in acute distress.     Appearance: She is well-developed. She is not diaphoretic.   HENT:      Mouth/Throat:      Pharynx: No oropharyngeal exudate.   Eyes:      General: No scleral icterus.        Right eye: No discharge.         Left eye: No discharge.      Conjunctiva/sclera: Conjunctivae normal.   Neck:      Musculoskeletal: Neck supple.      Thyroid: No thyromegaly.      Trachea: No tracheal deviation.   Cardiovascular:      Rate and Rhythm: Normal rate and regular rhythm.      Heart sounds: Normal heart sounds. No murmur.   Pulmonary:      Effort: Pulmonary effort is normal. No respiratory distress.      Breath sounds: Normal breath sounds. No wheezing, rhonchi or rales.   Abdominal:      General: Bowel sounds are normal. There is no distension.      Palpations: Abdomen is soft.      Tenderness: There is no abdominal tenderness. There is no guarding or rebound.   Musculoskeletal:      Right " lower leg: No edema.      Left lower leg: No edema.   Lymphadenopathy:      Cervical: No cervical adenopathy.   Skin:     General: Skin is warm and dry.   Neurological:      Mental Status: She is alert and oriented to person, place, and time.   Psychiatric:         Behavior: Behavior normal.         Judgment: Judgment normal.         Body mass index is 27.62 kg/m².      Data:  Previous outside labs reviewed and pertinent for vit D 21.      Plt 116 in 2019, 163 in 2020          Earnestine Hernandez MD  Internal Medicine

## 2020-12-10 ENCOUNTER — HOSPITAL ENCOUNTER (OUTPATIENT)
Dept: RADIOLOGY | Facility: HOSPITAL | Age: 85
Discharge: HOME | End: 2020-12-10
Attending: INTERNAL MEDICINE
Payer: MEDICARE

## 2020-12-10 DIAGNOSIS — M85.852 OSTEOPENIA OF BOTH HIPS: ICD-10-CM

## 2020-12-10 DIAGNOSIS — M85.851 OSTEOPENIA OF BOTH HIPS: ICD-10-CM

## 2020-12-10 PROCEDURE — 77080 DXA BONE DENSITY AXIAL: CPT | Mod: 26,,, | Performed by: RADIOLOGY

## 2020-12-10 PROCEDURE — 77080 DXA BONE DENSITY AXIAL: CPT | Mod: TC,PO

## 2020-12-10 PROCEDURE — 77080 DEXA BONE DENSITY SPINE HIP: ICD-10-PCS | Mod: 26,,, | Performed by: RADIOLOGY

## 2021-01-22 ENCOUNTER — PATIENT MESSAGE (OUTPATIENT)
Dept: ADMINISTRATIVE | Facility: OTHER | Age: 86
End: 2021-01-22

## 2021-02-11 ENCOUNTER — IMMUNIZATION (OUTPATIENT)
Dept: PHARMACY | Facility: CLINIC | Age: 86
End: 2021-02-11
Payer: MEDICARE

## 2021-02-11 DIAGNOSIS — Z23 NEED FOR VACCINATION: Primary | ICD-10-CM

## 2021-03-11 ENCOUNTER — IMMUNIZATION (OUTPATIENT)
Dept: PHARMACY | Facility: CLINIC | Age: 86
End: 2021-03-11
Payer: MEDICARE

## 2021-03-11 DIAGNOSIS — Z23 NEED FOR VACCINATION: Primary | ICD-10-CM

## 2021-04-23 ENCOUNTER — OFFICE VISIT (OUTPATIENT)
Dept: FAMILY MEDICINE | Facility: CLINIC | Age: 86
End: 2021-04-23
Payer: MEDICARE

## 2021-04-23 ENCOUNTER — LAB VISIT (OUTPATIENT)
Dept: LAB | Facility: HOSPITAL | Age: 86
End: 2021-04-23
Attending: PHYSICIAN ASSISTANT
Payer: MEDICARE

## 2021-04-23 VITALS
RESPIRATION RATE: 15 BRPM | HEIGHT: 67 IN | HEART RATE: 73 BPM | OXYGEN SATURATION: 97 % | SYSTOLIC BLOOD PRESSURE: 138 MMHG | WEIGHT: 176.56 LBS | DIASTOLIC BLOOD PRESSURE: 64 MMHG | BODY MASS INDEX: 27.71 KG/M2

## 2021-04-23 DIAGNOSIS — Z00.00 PREVENTATIVE HEALTH CARE: ICD-10-CM

## 2021-04-23 DIAGNOSIS — Z00.00 PREVENTATIVE HEALTH CARE: Primary | ICD-10-CM

## 2021-04-23 DIAGNOSIS — M25.552 LEFT HIP PAIN: ICD-10-CM

## 2021-04-23 LAB
ALBUMIN SERPL BCP-MCNC: 3.8 G/DL (ref 3.5–5.2)
ALP SERPL-CCNC: 69 U/L (ref 55–135)
ALT SERPL W/O P-5'-P-CCNC: 14 U/L (ref 10–44)
ANION GAP SERPL CALC-SCNC: 7 MMOL/L (ref 8–16)
AST SERPL-CCNC: 18 U/L (ref 10–40)
BILIRUB SERPL-MCNC: 0.4 MG/DL (ref 0.1–1)
BUN SERPL-MCNC: 20 MG/DL (ref 8–23)
CALCIUM SERPL-MCNC: 9.6 MG/DL (ref 8.7–10.5)
CHLORIDE SERPL-SCNC: 103 MMOL/L (ref 95–110)
CO2 SERPL-SCNC: 32 MMOL/L (ref 23–29)
CREAT SERPL-MCNC: 0.8 MG/DL (ref 0.5–1.4)
EST. GFR  (AFRICAN AMERICAN): >60 ML/MIN/1.73 M^2
EST. GFR  (NON AFRICAN AMERICAN): >60 ML/MIN/1.73 M^2
GLUCOSE SERPL-MCNC: 97 MG/DL (ref 70–110)
POTASSIUM SERPL-SCNC: 5.1 MMOL/L (ref 3.5–5.1)
PROT SERPL-MCNC: 7.3 G/DL (ref 6–8.4)
SODIUM SERPL-SCNC: 142 MMOL/L (ref 136–145)

## 2021-04-23 PROCEDURE — 99214 OFFICE O/P EST MOD 30 MIN: CPT | Mod: PBBFAC,PO | Performed by: PHYSICIAN ASSISTANT

## 2021-04-23 PROCEDURE — 99999 PR PBB SHADOW E&M-EST. PATIENT-LVL IV: ICD-10-PCS | Mod: PBBFAC,,, | Performed by: PHYSICIAN ASSISTANT

## 2021-04-23 PROCEDURE — 80053 COMPREHEN METABOLIC PANEL: CPT | Performed by: PHYSICIAN ASSISTANT

## 2021-04-23 PROCEDURE — 99999 PR PBB SHADOW E&M-EST. PATIENT-LVL IV: CPT | Mod: PBBFAC,,, | Performed by: PHYSICIAN ASSISTANT

## 2021-04-23 PROCEDURE — 36415 COLL VENOUS BLD VENIPUNCTURE: CPT | Mod: PO | Performed by: PHYSICIAN ASSISTANT

## 2021-04-23 PROCEDURE — 99213 OFFICE O/P EST LOW 20 MIN: CPT | Mod: S$PBB,,, | Performed by: PHYSICIAN ASSISTANT

## 2021-04-23 PROCEDURE — 99213 PR OFFICE/OUTPT VISIT, EST, LEVL III, 20-29 MIN: ICD-10-PCS | Mod: S$PBB,,, | Performed by: PHYSICIAN ASSISTANT

## 2021-04-23 RX ORDER — ACETAMINOPHEN AND PHENYLEPHRINE HCL 325; 5 MG/1; MG/1
TABLET ORAL
COMMUNITY
End: 2024-03-08

## 2021-06-11 ENCOUNTER — OFFICE VISIT (OUTPATIENT)
Dept: FAMILY MEDICINE | Facility: CLINIC | Age: 86
End: 2021-06-11
Payer: MEDICARE

## 2021-06-11 VITALS
BODY MASS INDEX: 27.63 KG/M2 | TEMPERATURE: 98 F | DIASTOLIC BLOOD PRESSURE: 70 MMHG | HEART RATE: 86 BPM | HEIGHT: 67 IN | OXYGEN SATURATION: 95 % | SYSTOLIC BLOOD PRESSURE: 120 MMHG | WEIGHT: 176.06 LBS

## 2021-06-11 DIAGNOSIS — M70.62 GREATER TROCHANTERIC BURSITIS OF LEFT HIP: Primary | ICD-10-CM

## 2021-06-11 DIAGNOSIS — L98.9 SKIN LESION: ICD-10-CM

## 2021-06-11 PROCEDURE — 20605 DRAIN/INJ JOINT/BURSA W/O US: CPT | Mod: S$PBB,LT,, | Performed by: INTERNAL MEDICINE

## 2021-06-11 PROCEDURE — 20605 DRAIN/INJ JOINT/BURSA W/O US: CPT | Mod: PBBFAC,PN | Performed by: INTERNAL MEDICINE

## 2021-06-11 PROCEDURE — 99214 OFFICE O/P EST MOD 30 MIN: CPT | Mod: 25,S$PBB,, | Performed by: INTERNAL MEDICINE

## 2021-06-11 PROCEDURE — 99214 PR OFFICE/OUTPT VISIT, EST, LEVL IV, 30-39 MIN: ICD-10-PCS | Mod: 25,S$PBB,, | Performed by: INTERNAL MEDICINE

## 2021-06-11 PROCEDURE — 99999 PR PBB SHADOW E&M-EST. PATIENT-LVL IV: ICD-10-PCS | Mod: PBBFAC,,, | Performed by: INTERNAL MEDICINE

## 2021-06-11 PROCEDURE — 20605 PR DRAIN/INJECT INTERMEDIATE JOINT/BURSA: ICD-10-PCS | Mod: S$PBB,LT,, | Performed by: INTERNAL MEDICINE

## 2021-06-11 PROCEDURE — 99999 PR PBB SHADOW E&M-EST. PATIENT-LVL IV: CPT | Mod: PBBFAC,,, | Performed by: INTERNAL MEDICINE

## 2021-06-11 PROCEDURE — 99214 OFFICE O/P EST MOD 30 MIN: CPT | Mod: PBBFAC,PN | Performed by: INTERNAL MEDICINE

## 2021-06-11 RX ORDER — TRIAMCINOLONE ACETONIDE 40 MG/ML
40 INJECTION, SUSPENSION INTRA-ARTICULAR; INTRAMUSCULAR
Status: COMPLETED | OUTPATIENT
Start: 2021-06-11 | End: 2021-06-11

## 2021-06-11 RX ADMIN — TRIAMCINOLONE ACETONIDE 40 MG: 40 INJECTION, SUSPENSION INTRA-ARTICULAR; INTRAMUSCULAR at 01:06

## 2021-06-17 ENCOUNTER — CLINICAL SUPPORT (OUTPATIENT)
Dept: REHABILITATION | Facility: HOSPITAL | Age: 86
End: 2021-06-17
Attending: INTERNAL MEDICINE
Payer: MEDICARE

## 2021-06-17 DIAGNOSIS — M70.62 GREATER TROCHANTERIC BURSITIS OF LEFT HIP: ICD-10-CM

## 2021-06-17 DIAGNOSIS — R26.9 GAIT DIFFICULTY: ICD-10-CM

## 2021-06-17 DIAGNOSIS — M25.552 LEFT HIP PAIN: ICD-10-CM

## 2021-06-17 DIAGNOSIS — R29.898 WEAKNESS OF LEFT HIP: ICD-10-CM

## 2021-06-17 PROCEDURE — 97110 THERAPEUTIC EXERCISES: CPT | Mod: PO

## 2021-06-17 PROCEDURE — 97161 PT EVAL LOW COMPLEX 20 MIN: CPT | Mod: PO

## 2021-07-13 ENCOUNTER — TELEPHONE (OUTPATIENT)
Dept: FAMILY MEDICINE | Facility: CLINIC | Age: 86
End: 2021-07-13

## 2021-08-18 ENCOUNTER — PATIENT MESSAGE (OUTPATIENT)
Dept: FAMILY MEDICINE | Facility: CLINIC | Age: 86
End: 2021-08-18

## 2021-10-25 ENCOUNTER — OFFICE VISIT (OUTPATIENT)
Dept: FAMILY MEDICINE | Facility: CLINIC | Age: 86
End: 2021-10-25
Payer: MEDICARE

## 2021-10-25 ENCOUNTER — LAB VISIT (OUTPATIENT)
Dept: LAB | Facility: HOSPITAL | Age: 86
End: 2021-10-25
Attending: PHYSICIAN ASSISTANT
Payer: MEDICARE

## 2021-10-25 VITALS
SYSTOLIC BLOOD PRESSURE: 136 MMHG | OXYGEN SATURATION: 97 % | HEIGHT: 67 IN | DIASTOLIC BLOOD PRESSURE: 68 MMHG | HEART RATE: 73 BPM | WEIGHT: 177.5 LBS | BODY MASS INDEX: 27.86 KG/M2

## 2021-10-25 DIAGNOSIS — Z13.6 ENCOUNTER FOR LIPID SCREENING FOR CARDIOVASCULAR DISEASE: ICD-10-CM

## 2021-10-25 DIAGNOSIS — G60.3 IDIOPATHIC PROGRESSIVE NEUROPATHY: ICD-10-CM

## 2021-10-25 DIAGNOSIS — R73.09 ELEVATED GLUCOSE: ICD-10-CM

## 2021-10-25 DIAGNOSIS — L84 CORN OF FOOT: Primary | ICD-10-CM

## 2021-10-25 DIAGNOSIS — R71.8 OTHER ABNORMALITY OF RED BLOOD CELLS: ICD-10-CM

## 2021-10-25 DIAGNOSIS — R79.9 ABNORMAL FINDING OF BLOOD CHEMISTRY, UNSPECIFIED: ICD-10-CM

## 2021-10-25 DIAGNOSIS — Z13.220 ENCOUNTER FOR LIPID SCREENING FOR CARDIOVASCULAR DISEASE: ICD-10-CM

## 2021-10-25 LAB
ALBUMIN SERPL BCP-MCNC: 3.9 G/DL (ref 3.5–5.2)
ALP SERPL-CCNC: 63 U/L (ref 55–135)
ALT SERPL W/O P-5'-P-CCNC: 11 U/L (ref 10–44)
ANION GAP SERPL CALC-SCNC: 11 MMOL/L (ref 8–16)
AST SERPL-CCNC: 22 U/L (ref 10–40)
BASOPHILS # BLD AUTO: 0.03 K/UL (ref 0–0.2)
BASOPHILS NFR BLD: 0.4 % (ref 0–1.9)
BILIRUB SERPL-MCNC: 0.4 MG/DL (ref 0.1–1)
BUN SERPL-MCNC: 20 MG/DL (ref 8–23)
CALCIUM SERPL-MCNC: 9.9 MG/DL (ref 8.7–10.5)
CHLORIDE SERPL-SCNC: 104 MMOL/L (ref 95–110)
CHOLEST SERPL-MCNC: 243 MG/DL (ref 120–199)
CHOLEST/HDLC SERPL: 3.5 {RATIO} (ref 2–5)
CO2 SERPL-SCNC: 27 MMOL/L (ref 23–29)
CREAT SERPL-MCNC: 0.8 MG/DL (ref 0.5–1.4)
DIFFERENTIAL METHOD: ABNORMAL
EOSINOPHIL # BLD AUTO: 0.1 K/UL (ref 0–0.5)
EOSINOPHIL NFR BLD: 1.3 % (ref 0–8)
ERYTHROCYTE [DISTWIDTH] IN BLOOD BY AUTOMATED COUNT: 16.4 % (ref 11.5–14.5)
EST. GFR  (AFRICAN AMERICAN): >60 ML/MIN/1.73 M^2
EST. GFR  (NON AFRICAN AMERICAN): >60 ML/MIN/1.73 M^2
ESTIMATED AVG GLUCOSE: 123 MG/DL (ref 68–131)
GLUCOSE SERPL-MCNC: 98 MG/DL (ref 70–110)
HBA1C MFR BLD: 5.9 % (ref 4–5.6)
HCT VFR BLD AUTO: 43.6 % (ref 37–48.5)
HDLC SERPL-MCNC: 70 MG/DL (ref 40–75)
HDLC SERPL: 28.8 % (ref 20–50)
HGB BLD-MCNC: 13.4 G/DL (ref 12–16)
IMM GRANULOCYTES # BLD AUTO: 0.02 K/UL (ref 0–0.04)
IMM GRANULOCYTES NFR BLD AUTO: 0.3 % (ref 0–0.5)
LDLC SERPL CALC-MCNC: 150.2 MG/DL (ref 63–159)
LYMPHOCYTES # BLD AUTO: 1.2 K/UL (ref 1–4.8)
LYMPHOCYTES NFR BLD: 16.6 % (ref 18–48)
MCH RBC QN AUTO: 24.6 PG (ref 27–31)
MCHC RBC AUTO-ENTMCNC: 30.7 G/DL (ref 32–36)
MCV RBC AUTO: 80 FL (ref 82–98)
MONOCYTES # BLD AUTO: 1 K/UL (ref 0.3–1)
MONOCYTES NFR BLD: 12.8 % (ref 4–15)
NEUTROPHILS # BLD AUTO: 5.1 K/UL (ref 1.8–7.7)
NEUTROPHILS NFR BLD: 68.6 % (ref 38–73)
NONHDLC SERPL-MCNC: 173 MG/DL
NRBC BLD-RTO: 0 /100 WBC
PLATELET # BLD AUTO: 152 K/UL (ref 150–450)
PMV BLD AUTO: ABNORMAL FL (ref 9.2–12.9)
POTASSIUM SERPL-SCNC: 5 MMOL/L (ref 3.5–5.1)
PROT SERPL-MCNC: 7.1 G/DL (ref 6–8.4)
RBC # BLD AUTO: 5.44 M/UL (ref 4–5.4)
SODIUM SERPL-SCNC: 142 MMOL/L (ref 136–145)
TRIGL SERPL-MCNC: 114 MG/DL (ref 30–150)
VIT B12 SERPL-MCNC: 764 PG/ML (ref 210–950)
WBC # BLD AUTO: 7.49 K/UL (ref 3.9–12.7)

## 2021-10-25 PROCEDURE — 85025 COMPLETE CBC W/AUTO DIFF WBC: CPT | Performed by: PHYSICIAN ASSISTANT

## 2021-10-25 PROCEDURE — 36415 COLL VENOUS BLD VENIPUNCTURE: CPT | Mod: PO | Performed by: PHYSICIAN ASSISTANT

## 2021-10-25 PROCEDURE — 80061 LIPID PANEL: CPT | Performed by: PHYSICIAN ASSISTANT

## 2021-10-25 PROCEDURE — 83036 HEMOGLOBIN GLYCOSYLATED A1C: CPT | Performed by: PHYSICIAN ASSISTANT

## 2021-10-25 PROCEDURE — 84466 ASSAY OF TRANSFERRIN: CPT | Performed by: PHYSICIAN ASSISTANT

## 2021-10-25 PROCEDURE — 82607 VITAMIN B-12: CPT | Performed by: PHYSICIAN ASSISTANT

## 2021-10-25 PROCEDURE — 99214 PR OFFICE/OUTPT VISIT, EST, LEVL IV, 30-39 MIN: ICD-10-PCS | Mod: S$PBB,,, | Performed by: PHYSICIAN ASSISTANT

## 2021-10-25 PROCEDURE — G0008 ADMIN INFLUENZA VIRUS VAC: HCPCS | Mod: PBBFAC,PO

## 2021-10-25 PROCEDURE — 90694 VACC AIIV4 NO PRSRV 0.5ML IM: CPT | Mod: PBBFAC,PO

## 2021-10-25 PROCEDURE — 99214 OFFICE O/P EST MOD 30 MIN: CPT | Mod: S$PBB,,, | Performed by: PHYSICIAN ASSISTANT

## 2021-10-25 PROCEDURE — 82728 ASSAY OF FERRITIN: CPT | Performed by: PHYSICIAN ASSISTANT

## 2021-10-25 PROCEDURE — 99214 OFFICE O/P EST MOD 30 MIN: CPT | Mod: PBBFAC,PO | Performed by: PHYSICIAN ASSISTANT

## 2021-10-25 PROCEDURE — 99999 PR PBB SHADOW E&M-EST. PATIENT-LVL IV: CPT | Mod: PBBFAC,,, | Performed by: PHYSICIAN ASSISTANT

## 2021-10-25 PROCEDURE — 99999 PR PBB SHADOW E&M-EST. PATIENT-LVL IV: ICD-10-PCS | Mod: PBBFAC,,, | Performed by: PHYSICIAN ASSISTANT

## 2021-10-25 PROCEDURE — 80053 COMPREHEN METABOLIC PANEL: CPT | Performed by: PHYSICIAN ASSISTANT

## 2021-10-26 LAB
FERRITIN SERPL-MCNC: 174 NG/ML (ref 20–300)
IRON SERPL-MCNC: 64 UG/DL (ref 30–160)
SATURATED IRON: 17 % (ref 20–50)
TOTAL IRON BINDING CAPACITY: 377 UG/DL (ref 250–450)
TRANSFERRIN SERPL-MCNC: 255 MG/DL (ref 200–375)

## 2021-10-27 ENCOUNTER — TELEPHONE (OUTPATIENT)
Dept: FAMILY MEDICINE | Facility: CLINIC | Age: 86
End: 2021-10-27
Payer: MEDICARE

## 2021-10-28 ENCOUNTER — PATIENT MESSAGE (OUTPATIENT)
Dept: FAMILY MEDICINE | Facility: CLINIC | Age: 86
End: 2021-10-28
Payer: MEDICARE

## 2021-10-28 DIAGNOSIS — E78.2 MIXED HYPERLIPIDEMIA: Primary | ICD-10-CM

## 2021-10-28 RX ORDER — ATORVASTATIN CALCIUM 10 MG/1
10 TABLET, FILM COATED ORAL DAILY
Qty: 90 TABLET | Refills: 3 | Status: SHIPPED | OUTPATIENT
Start: 2021-10-28 | End: 2022-01-20

## 2022-01-20 ENCOUNTER — OFFICE VISIT (OUTPATIENT)
Dept: FAMILY MEDICINE | Facility: CLINIC | Age: 87
End: 2022-01-20
Payer: MEDICARE

## 2022-01-20 VITALS
WEIGHT: 171.31 LBS | OXYGEN SATURATION: 96 % | HEART RATE: 70 BPM | BODY MASS INDEX: 27.53 KG/M2 | DIASTOLIC BLOOD PRESSURE: 61 MMHG | HEIGHT: 66 IN | SYSTOLIC BLOOD PRESSURE: 138 MMHG

## 2022-01-20 DIAGNOSIS — U07.1 COVID-19 VIRUS DETECTED: ICD-10-CM

## 2022-01-20 DIAGNOSIS — U07.1 COVID-19: Primary | ICD-10-CM

## 2022-01-20 DIAGNOSIS — E78.2 MIXED HYPERLIPIDEMIA: ICD-10-CM

## 2022-01-20 DIAGNOSIS — R05.9 COUGH: ICD-10-CM

## 2022-01-20 DIAGNOSIS — M70.62 GREATER TROCHANTERIC BURSITIS OF LEFT HIP: ICD-10-CM

## 2022-01-20 LAB
CTP QC/QA: YES
CTP QC/QA: YES
POC MOLECULAR INFLUENZA A AGN: NEGATIVE
POC MOLECULAR INFLUENZA B AGN: NEGATIVE
SARS-COV-2 RDRP RESP QL NAA+PROBE: POSITIVE

## 2022-01-20 PROCEDURE — 99999 PR PBB SHADOW E&M-EST. PATIENT-LVL III: CPT | Mod: PBBFAC,CR,, | Performed by: INTERNAL MEDICINE

## 2022-01-20 PROCEDURE — 99213 OFFICE O/P EST LOW 20 MIN: CPT | Mod: PBBFAC,PN | Performed by: INTERNAL MEDICINE

## 2022-01-20 PROCEDURE — 99214 PR OFFICE/OUTPT VISIT, EST, LEVL IV, 30-39 MIN: ICD-10-PCS | Mod: S$PBB,CR,, | Performed by: INTERNAL MEDICINE

## 2022-01-20 PROCEDURE — U0002 COVID-19 LAB TEST NON-CDC: HCPCS | Mod: PBBFAC,PN | Performed by: INTERNAL MEDICINE

## 2022-01-20 PROCEDURE — 87502 INFLUENZA DNA AMP PROBE: CPT | Mod: PBBFAC,PN | Performed by: INTERNAL MEDICINE

## 2022-01-20 PROCEDURE — 99214 OFFICE O/P EST MOD 30 MIN: CPT | Mod: S$PBB,CR,, | Performed by: INTERNAL MEDICINE

## 2022-01-20 PROCEDURE — 99999 PR PBB SHADOW E&M-EST. PATIENT-LVL III: ICD-10-PCS | Mod: PBBFAC,CR,, | Performed by: INTERNAL MEDICINE

## 2022-01-20 NOTE — PROGRESS NOTES
Assessment and Plan:    1. COVID-19  2. Cough  - POCT COVID-19 Rapid Screening  - POCT Influenza A/B Molecular  Discussed symptomatic management and quarantine recommendations    3. Mixed hyperlipidemia  OK to discontinue lipitor per patient preference.     4. Greater trochanteric bursitis of left hip  Will schedule procedure visit    ______________________________________________________________________  Subjective:    Chief Complaint:  COVID    HPI:  Peg is a 86 y.o. year old female patient with visit today for COVID    She reports that her symptoms started 5 days ago with chills and back aching. These original symptoms lasted 1 day. Never had fever. No GI symptoms. Has had some sore throat. Has had some cough. Feeling fatigued. Has been taking tussin DM OTC and this is helping. Overall feels that symptoms are very mild and improving.     She would like to schedule a visit in the future to get another injection for hip bursitis.     She thought about the atorvastatin that had been prescribed at last visit with NP and decided that she is not interested in taking this medication at this time.      Medications:  Current Outpatient Medications on File Prior to Visit   Medication Sig Dispense Refill    alprazolam (XANAX) 0.25 MG tablet Take 1 tablet (0.25 mg total) by mouth daily as needed for Anxiety (take 1/2 to 1 tablet as needed). 30 tablet 0    clotrimazole-betamethasone 1-0.05% (LOTRISONE) cream Apply topically 2 (two) times daily. 60 g 0    multivit with minerals/lutein (MULTIVITAMIN 50 PLUS ORAL) Take 1 tablet by mouth.      vit A/vit C/vit E/zinc/copper (PRESERVISION AREDS ORAL) Take 1 tablet by mouth once daily.      atorvastatin (LIPITOR) 10 MG tablet Take 1 tablet (10 mg total) by mouth once daily. (Patient not taking: Reported on 1/20/2022) 90 tablet 3    biotin 10,000 mcg Cap Take by mouth.       No current facility-administered medications on file prior to visit.       Review of  "Systems:  Review of Systems   Constitutional: Positive for chills and fatigue. Negative for fever.   HENT: Positive for postnasal drip, rhinorrhea and sore throat. Negative for sinus pressure and sinus pain.    Respiratory: Positive for cough. Negative for shortness of breath and wheezing.    Gastrointestinal: Negative for diarrhea, nausea and vomiting.   Neurological: Negative for dizziness, syncope and light-headedness.       Past Medical History:  Past Medical History:   Diagnosis Date    Depression        Objective:    Vitals:  Vitals:    01/20/22 1136   BP: 138/61   Pulse: 70   SpO2: 96%   Weight: 77.7 kg (171 lb 4.8 oz)   Height: 5' 6" (1.676 m)   PainSc: 0-No pain       Physical Exam  Vitals reviewed.   Constitutional:       General: She is not in acute distress.     Appearance: She is well-developed and well-nourished.   HENT:      Mouth/Throat:      Mouth: Oropharynx is clear and moist.   Eyes:      General:         Right eye: No discharge.         Left eye: No discharge.      Conjunctiva/sclera: Conjunctivae normal.   Cardiovascular:      Rate and Rhythm: Normal rate and regular rhythm.   Pulmonary:      Effort: Pulmonary effort is normal. No respiratory distress.   Skin:     General: Skin is warm and dry.   Neurological:      Mental Status: She is alert and oriented to person, place, and time.   Psychiatric:         Mood and Affect: Mood and affect normal.         Behavior: Behavior normal.         Thought Content: Thought content normal.         Judgment: Judgment normal.         Data:  COVID +  Flu -    Earnestine Hernandez MD  Internal Medicine    "

## 2022-02-15 ENCOUNTER — OFFICE VISIT (OUTPATIENT)
Dept: FAMILY MEDICINE | Facility: CLINIC | Age: 87
End: 2022-02-15
Payer: MEDICARE

## 2022-02-15 VITALS
HEART RATE: 77 BPM | RESPIRATION RATE: 18 BRPM | HEIGHT: 66 IN | BODY MASS INDEX: 27.74 KG/M2 | SYSTOLIC BLOOD PRESSURE: 122 MMHG | WEIGHT: 172.63 LBS | DIASTOLIC BLOOD PRESSURE: 62 MMHG | OXYGEN SATURATION: 97 %

## 2022-02-15 DIAGNOSIS — I70.0 AORTIC ATHEROSCLEROSIS: ICD-10-CM

## 2022-02-15 DIAGNOSIS — M70.62 GREATER TROCHANTERIC BURSITIS OF LEFT HIP: Primary | ICD-10-CM

## 2022-02-15 DIAGNOSIS — K64.9 HEMORRHOIDS, UNSPECIFIED HEMORRHOID TYPE: ICD-10-CM

## 2022-02-15 DIAGNOSIS — E78.5 HYPERLIPIDEMIA, UNSPECIFIED HYPERLIPIDEMIA TYPE: ICD-10-CM

## 2022-02-15 DIAGNOSIS — D69.6 THROMBOCYTOPENIA, UNSPECIFIED: ICD-10-CM

## 2022-02-15 PROCEDURE — 20605 DRAIN/INJ JOINT/BURSA W/O US: CPT | Mod: PBBFAC,PN | Performed by: INTERNAL MEDICINE

## 2022-02-15 PROCEDURE — 99999 PR PBB SHADOW E&M-EST. PATIENT-LVL III: CPT | Mod: PBBFAC,,, | Performed by: INTERNAL MEDICINE

## 2022-02-15 PROCEDURE — 20605 PR DRAIN/INJECT INTERMEDIATE JOINT/BURSA: ICD-10-PCS | Mod: S$PBB,LT,, | Performed by: INTERNAL MEDICINE

## 2022-02-15 PROCEDURE — 99213 OFFICE O/P EST LOW 20 MIN: CPT | Mod: PBBFAC,PN | Performed by: INTERNAL MEDICINE

## 2022-02-15 PROCEDURE — 99999 PR PBB SHADOW E&M-EST. PATIENT-LVL III: ICD-10-PCS | Mod: PBBFAC,,, | Performed by: INTERNAL MEDICINE

## 2022-02-15 PROCEDURE — 20605 DRAIN/INJ JOINT/BURSA W/O US: CPT | Mod: S$PBB,LT,, | Performed by: INTERNAL MEDICINE

## 2022-02-15 PROCEDURE — 99214 OFFICE O/P EST MOD 30 MIN: CPT | Mod: 25,S$PBB,, | Performed by: INTERNAL MEDICINE

## 2022-02-15 PROCEDURE — 99214 PR OFFICE/OUTPT VISIT, EST, LEVL IV, 30-39 MIN: ICD-10-PCS | Mod: 25,S$PBB,, | Performed by: INTERNAL MEDICINE

## 2022-02-15 RX ORDER — HYDROCORTISONE 25 MG/G
CREAM TOPICAL 2 TIMES DAILY
Qty: 28 G | Refills: 5 | Status: SHIPPED | OUTPATIENT
Start: 2022-02-15 | End: 2024-03-08 | Stop reason: SDUPTHER

## 2022-02-15 RX ORDER — TRIAMCINOLONE ACETONIDE 40 MG/ML
40 INJECTION, SUSPENSION INTRA-ARTICULAR; INTRAMUSCULAR
Status: COMPLETED | OUTPATIENT
Start: 2022-02-15 | End: 2022-02-15

## 2022-02-15 RX ADMIN — TRIAMCINOLONE ACETONIDE 40 MG: 40 INJECTION, SUSPENSION INTRA-ARTICULAR; INTRAMUSCULAR at 09:02

## 2022-02-15 NOTE — PROGRESS NOTES
Assessment and Plan:    1. Greater trochanteric bursitis of left hip  Pre-Op Diagnosis: Greater trochanteric bursitis  Post-Op Diagnosis: Same  Procedure: Bursa steroid injection  Performing Physician: Earnestine Hernandez  Laterality: Left    Injection    4cc of 1% lidocaine   40 mg triamcinolone     Consent: Prior to the procedure, the risks including bleeding, infection, tendon rupture, and nerve damage as well as the benefits of the procedure including improvement in pain and function were explained to the patient and verbal consent was obtained.    Procedure: A time-out was performed prior to initiating procedure to be sure of right patient and right location. The area (lateral hip) was prepped with alcohol wipes in the usual sterile manner. The needle was inserted into the affected area and the steroid was injected.  There were no complications during this procedure.    Follow up: The patient tolerated the procedure well without complications.  Standard post-procedure care was explained and return precautions are given.    - triamcinolone acetonide injection 40 mg    2. Hemorrhoids, unspecified hemorrhoid type  - hydrocortisone 2.5 % cream; Apply topically 2 (two) times daily.  Dispense: 28 g; Refill: 5    3. Thrombocytopenia, unspecified  Improved on last labs, no recent bleeding    4. Hyperlipidemia, unspecified hyperlipidemia type  5. Aortic atherosclerosis  Declines statin, will continue to work on diet. Recheck labs with yearly labs in the fall.    ______________________________________________________________________  Subjective:    Chief Complaint:  Follow up and hip injection    HPI:  Peg is a 86 y.o. year old female here for follow up and hip injection.     Last hip injection for left sided greater trochanteric bursitis was in June 2021, reports that she had really great effect with this and the relief lasted at least 6 months fully. Pain started to come back in January of this year and has been  "getting worse with walking. Would like repeat injection.     She has been using lotrisone as prescribed by previous Gyn for anal itching 2/2 hemorrhoids. Reports that this has been working well, but is expensive.     She has been working on diet for HLD, reports she has been doing well. Still not interested in medication.     Has not had any bleeding episodes lately. Recent CBC with improved thrombocytopenia.      Medications:  Current Outpatient Medications on File Prior to Visit   Medication Sig Dispense Refill    alprazolam (XANAX) 0.25 MG tablet Take 1 tablet (0.25 mg total) by mouth daily as needed for Anxiety (take 1/2 to 1 tablet as needed). 30 tablet 0    biotin 10,000 mcg Cap Take by mouth.      clotrimazole-betamethasone 1-0.05% (LOTRISONE) cream Apply topically 2 (two) times daily. 60 g 0    multivit with minerals/lutein (MULTIVITAMIN 50 PLUS ORAL) Take 1 tablet by mouth.      vit A/vit C/vit E/zinc/copper (PRESERVISION AREDS ORAL) Take 1 tablet by mouth once daily.       No current facility-administered medications on file prior to visit.       Review of Systems:  Review of Systems   Constitutional: Negative for chills and fever.   Respiratory: Negative for shortness of breath and wheezing.    Cardiovascular: Negative for chest pain and leg swelling.   Gastrointestinal: Negative for blood in stool, diarrhea, nausea, rectal pain and vomiting.   Musculoskeletal: Positive for arthralgias.   Neurological: Negative for dizziness, syncope and light-headedness.       Past Medical History:  Past Medical History:   Diagnosis Date    Depression        Objective:    Vitals:  Vitals:    02/15/22 0829   BP: 122/62   Pulse: 77   Resp: 18   SpO2: 97%   Weight: 78.3 kg (172 lb 9.9 oz)   Height: 5' 6" (1.676 m)   PainSc:   4   PainLoc: Hip       Physical Exam  Vitals reviewed.   Constitutional:       General: She is not in acute distress.     Appearance: She is well-developed and well-nourished.   HENT:      " Mouth/Throat:      Mouth: Oropharynx is clear and moist.   Eyes:      General:         Right eye: No discharge.         Left eye: No discharge.      Conjunctiva/sclera: Conjunctivae normal.   Cardiovascular:      Rate and Rhythm: Normal rate and regular rhythm.   Pulmonary:      Effort: Pulmonary effort is normal. No respiratory distress.   Musculoskeletal:      Comments: left hip with normal ROM, tenderness with palpation over greater trochanter   Skin:     General: Skin is warm and dry.   Neurological:      Mental Status: She is alert and oriented to person, place, and time.   Psychiatric:         Mood and Affect: Mood and affect normal.         Behavior: Behavior normal.         Thought Content: Thought content normal.         Judgment: Judgment normal.         Data:  Plt 153 on last labs    Earnestine Hernandez MD  Internal Medicine

## 2022-03-19 ENCOUNTER — PATIENT MESSAGE (OUTPATIENT)
Dept: FAMILY MEDICINE | Facility: CLINIC | Age: 87
End: 2022-03-19
Payer: MEDICARE

## 2022-03-21 ENCOUNTER — PATIENT MESSAGE (OUTPATIENT)
Dept: FAMILY MEDICINE | Facility: CLINIC | Age: 87
End: 2022-03-21
Payer: MEDICARE

## 2022-03-21 DIAGNOSIS — R09.81 NASAL CONGESTION: Primary | ICD-10-CM

## 2022-03-29 ENCOUNTER — OFFICE VISIT (OUTPATIENT)
Dept: FAMILY MEDICINE | Facility: CLINIC | Age: 87
End: 2022-03-29
Payer: MEDICARE

## 2022-03-29 VITALS
WEIGHT: 170.19 LBS | BODY MASS INDEX: 27.35 KG/M2 | RESPIRATION RATE: 18 BRPM | HEIGHT: 66 IN | SYSTOLIC BLOOD PRESSURE: 134 MMHG | HEART RATE: 86 BPM | DIASTOLIC BLOOD PRESSURE: 66 MMHG | OXYGEN SATURATION: 96 %

## 2022-03-29 DIAGNOSIS — R09.89 PHLEGM IN THROAT: Primary | ICD-10-CM

## 2022-03-29 PROCEDURE — 99999 PR PBB SHADOW E&M-EST. PATIENT-LVL IV: CPT | Mod: PBBFAC,,, | Performed by: INTERNAL MEDICINE

## 2022-03-29 PROCEDURE — 99214 OFFICE O/P EST MOD 30 MIN: CPT | Mod: PBBFAC,PN | Performed by: INTERNAL MEDICINE

## 2022-03-29 PROCEDURE — 99214 OFFICE O/P EST MOD 30 MIN: CPT | Mod: S$PBB,,, | Performed by: INTERNAL MEDICINE

## 2022-03-29 PROCEDURE — 99999 PR PBB SHADOW E&M-EST. PATIENT-LVL IV: ICD-10-PCS | Mod: PBBFAC,,, | Performed by: INTERNAL MEDICINE

## 2022-03-29 PROCEDURE — 99214 PR OFFICE/OUTPT VISIT, EST, LEVL IV, 30-39 MIN: ICD-10-PCS | Mod: S$PBB,,, | Performed by: INTERNAL MEDICINE

## 2022-03-29 RX ORDER — ZINC GLUCONATE 50 MG
50 TABLET ORAL DAILY
COMMUNITY

## 2022-03-29 RX ORDER — CHOLECALCIFEROL (VITAMIN D3) 25 MCG
1000 TABLET ORAL DAILY
COMMUNITY

## 2022-03-29 RX ORDER — FLUTICASONE PROPIONATE 50 MCG
1 SPRAY, SUSPENSION (ML) NASAL DAILY
COMMUNITY
End: 2022-08-23

## 2022-03-29 NOTE — PATIENT INSTRUCTIONS
Flonase: You should start using 2 sprays in each nostril for 1 week, then using 1 spray in each nostril each night. To correctly use the spray, lean forward and aim the spray toward the ear on that side. It can be helpful to use the opposite arm for each side to aim correctly.     Start taking zyrtec (cetirizine) 10 mg daily.     You can continue to take plain guaifenesin (mucinex or robitussin, NOT DM or D) as needed as this will help thin the mucus.     Take omeprazole 20 mg daily for 2 weeks then stop.

## 2022-03-29 NOTE — PROGRESS NOTES
Assessment and Plan:    1. Phlegm in throat  Discussed possible causes of this. Overall feel like postnasal drip is more likely the cause, but per patient report she was told once that she had evidence of LPRD on laryngoscopy so this is also possible. Will use nasal steroid and PO antihistamine. Two week trial of omeprazole. Follow up in 1 month. Discussed stopping dextromethorphan as it is possible based on timing that this is the cause of the dizzy sensation.  ______________________________________________________________________  Subjective:    Chief Complaint:  Phlegm in throat    HPI:  Peg is a 86 y.o. year old female here to discuss episodes of phlegm in her throat.    She reports that this all started in January when she had COVID. Has been having a lot of phlegm in her throat since then.She has been taking flonase for the last week. She had started taking Robitussin DM also in the last week. She reports that in the last week she has been having dizziness. She had not put this together, but on questioning reports that she does think this only started after she started taking the dextromethorphan. Describes this as a mild lightheadedness, not a spinning sensation.    She reports that several years ago she had seen an ENT for similar symptoms (phlegm, not dizziness) and was told that she had reflux. Had been prescribed famotidine at that time and did not have improvement with this at that time.      Medications:  Current Outpatient Medications on File Prior to Visit   Medication Sig Dispense Refill    alprazolam (XANAX) 0.25 MG tablet Take 1 tablet (0.25 mg total) by mouth daily as needed for Anxiety (take 1/2 to 1 tablet as needed). 30 tablet 0    fluticasone propionate (FLONASE) 50 mcg/actuation nasal spray 1 spray by Each Nostril route once daily.      multivit with minerals/lutein (MULTIVITAMIN 50 PLUS ORAL) Take 1 tablet by mouth.      multivitamin with minerals tablet Take 1 tablet by mouth once  "daily. Hair, skin, and nails vitamin      vit A/vit C/vit E/zinc/copper (PRESERVISION AREDS ORAL) Take 1 tablet by mouth once daily.      vitamin D (VITAMIN D3) 1000 units Tab Take 1,000 Units by mouth once daily.      zinc gluconate 50 mg tablet Take 50 mg by mouth once daily.      biotin 10,000 mcg Cap Take by mouth.      clotrimazole-betamethasone 1-0.05% (LOTRISONE) cream Apply topically 2 (two) times daily. (Patient not taking: Reported on 3/29/2022) 60 g 0    hydrocortisone 2.5 % cream Apply topically 2 (two) times daily. (Patient not taking: Reported on 3/29/2022) 28 g 5     No current facility-administered medications on file prior to visit.       Review of Systems:  Review of Systems   Constitutional: Negative for chills and fever.   HENT: Positive for congestion and postnasal drip.    Gastrointestinal: Negative for abdominal pain, diarrhea, nausea and vomiting.   Neurological: Positive for light-headedness. Negative for dizziness and syncope.       Past Medical History:  Past Medical History:   Diagnosis Date    Depression        Objective:    Vitals:  Vitals:    03/29/22 1623   BP: 134/66   Pulse: 86   Resp: 18   SpO2: 96%   Weight: 77.2 kg (170 lb 3.1 oz)   Height: 5' 6" (1.676 m)   PainSc: 0-No pain       Physical Exam  Vitals reviewed.   Constitutional:       General: She is not in acute distress.     Appearance: She is well-developed.   HENT:      Right Ear: Tympanic membrane and ear canal normal.      Left Ear: Tympanic membrane and ear canal normal.      Mouth/Throat:      Lips: No lesions.      Pharynx: No pharyngeal swelling, oropharyngeal exudate, posterior oropharyngeal erythema or uvula swelling.      Tonsils: No tonsillar exudate. 0 on the right. 0 on the left.   Eyes:      General: No scleral icterus.  Cardiovascular:      Rate and Rhythm: Normal rate and regular rhythm.      Heart sounds: No murmur heard.  Pulmonary:      Effort: Pulmonary effort is normal. No respiratory distress.    "   Breath sounds: Normal breath sounds. No wheezing, rhonchi or rales.   Musculoskeletal:      Right lower leg: No edema.      Left lower leg: No edema.   Skin:     General: Skin is warm and dry.   Neurological:      Mental Status: She is alert and oriented to person, place, and time.   Psychiatric:         Behavior: Behavior normal.         Data:  Previously normal renal function    Earnestine Hernandez MD  Internal Medicine

## 2022-04-22 ENCOUNTER — OFFICE VISIT (OUTPATIENT)
Dept: OTOLARYNGOLOGY | Facility: CLINIC | Age: 87
End: 2022-04-22
Payer: MEDICARE

## 2022-04-22 VITALS — WEIGHT: 170.63 LBS | TEMPERATURE: 98 F | HEIGHT: 67 IN | BODY MASS INDEX: 26.78 KG/M2

## 2022-04-22 DIAGNOSIS — Z98.890 HISTORY OF ENDOSCOPIC SINUS SURGERY: ICD-10-CM

## 2022-04-22 DIAGNOSIS — R49.0 HOARSENESS: Primary | ICD-10-CM

## 2022-04-22 DIAGNOSIS — R09.82 POST-NASAL DRIP: ICD-10-CM

## 2022-04-22 DIAGNOSIS — R09.89 THROAT CLEARING: ICD-10-CM

## 2022-04-22 DIAGNOSIS — K21.9 LARYNGOPHARYNGEAL REFLUX (LPR): ICD-10-CM

## 2022-04-22 PROCEDURE — 99214 OFFICE O/P EST MOD 30 MIN: CPT | Mod: PBBFAC,PO | Performed by: OTOLARYNGOLOGY

## 2022-04-22 PROCEDURE — 99999 PR PBB SHADOW E&M-EST. PATIENT-LVL IV: ICD-10-PCS | Mod: PBBFAC,,, | Performed by: OTOLARYNGOLOGY

## 2022-04-22 PROCEDURE — 99999 PR PBB SHADOW E&M-EST. PATIENT-LVL IV: CPT | Mod: PBBFAC,,, | Performed by: OTOLARYNGOLOGY

## 2022-04-22 PROCEDURE — 99204 OFFICE O/P NEW MOD 45 MIN: CPT | Mod: S$PBB,,, | Performed by: OTOLARYNGOLOGY

## 2022-04-22 PROCEDURE — 99204 PR OFFICE/OUTPT VISIT, NEW, LEVL IV, 45-59 MIN: ICD-10-PCS | Mod: S$PBB,,, | Performed by: OTOLARYNGOLOGY

## 2022-04-22 RX ORDER — OMEPRAZOLE 20 MG/1
CAPSULE, DELAYED RELEASE ORAL
Qty: 60 CAPSULE | Refills: 2 | Status: SHIPPED | OUTPATIENT
Start: 2022-04-22 | End: 2023-06-22

## 2022-04-22 RX ORDER — SOD CHLOR,BICARB/SQUEEZ BOTTLE
1 PACKET, WITH RINSE DEVICE NASAL 2 TIMES DAILY
Qty: 1 EACH | Refills: 11 | Status: SHIPPED | OUTPATIENT
Start: 2022-04-22

## 2022-04-22 NOTE — PATIENT INSTRUCTIONS
Wearing avoids by sipping water throughout the day instead of clearing the throat will reduce sensation of mucus in the throat.  If unable to sip water during the day at least use a lozenge.    Think of food as medicine and try to use more anti-inflammatory foods and avoid inflammatory foods.  Try and identify food triggers for throat clearing and nasal sinus and throat irritation.    Start nasal saline irrigations with a Neti squeeze bottle with distilled water and standard Shaka continue saline throughout the day especially during dry seasons to avoid dryness of the throat and nose as discussed.      We will start omeprazole 20 mg twice daily 30 minutes before breakfast and dinner for 1 month to trying get the irritation of recurrent GERD symptoms and throat clearing under control with the expectation of weaning this down to an as needed use.  Weight loss, no late night eating, avoiding caffeine, eating smaller meals throughout the day, elevating the head of the bed up onto bricks all can be of help as well.  If reflux symptoms not improving talk to primary care about a GI referral for possible endoscopy to rule out eosinophilic esophagitis or adverse changes of the esophagus associated with reflux as discussed.

## 2022-04-22 NOTE — PROGRESS NOTES
[facility]    Subjective:      Patient: Peg Moreno Patient PCP: Earnestine Hernandez MD         :  1935     Sex:  female      MRN:  [id number]          Date of Visit: 2022      Chief Complaint: Nasal Congestion      Patient ID: Peg Moreno is a 86 y.o. female former smoker who was referred to me by Dr. Earnestine Hernandez in consultation for Nasal Congestion.  She has a reported history of allergic rhinitis and trigeminal neuralgia with history of sinus surgery in the days prior to endoscopic sinus surgery who had a a mild asymptomatic case of COVID post vaccination and boosting in January which resulted in persistent mucus in the throat snorting and clearing.  She feels like she needs to bring up mucus and will sometimes bring up white mucus into her mouth.  She is not having any new rhinitis drainage from the nose loss of smell nasal obstruction blood or pus.  There is no fever shortness of breath or cough.  There is some mild voice change.  She was treated by Dr. Mtz in with Flonase Zyrtec mucolytics and a 2 week course of over-the-counter Prilosec.  Between all of these she felt that the Prilosec seemed to help quite a bit.  She saw an ENT doctor out of the area when she was traveling who did what sounds like a nasolaryngoscopy with findings of reflux.  She was not offered treatment for reflux at that time.  She is also going through a lot of stress and feels that the stress and anxiety may be manifesting in her throat as well.    Review of Systems   Constitutional: Negative.    HENT: Positive for facial swelling, postnasal drip, sore throat (globus) and voice change. Negative for congestion, ear discharge, rhinorrhea, sinus pressure, sinus pain and sneezing.    Eyes: Negative.    Respiratory: Negative.    Cardiovascular: Negative.    Gastrointestinal: Negative.    Endocrine: Negative.    Genitourinary: Negative.    Musculoskeletal: Negative.    Allergic/Immunologic: Negative.     Neurological: Negative.  Negative for dizziness and headaches.   Hematological: Negative.    Psychiatric/Behavioral: Negative.         Past Medical History  She has a past medical history of Depression.    Family / Surgical / Social History  Her family history is not on file.    Past Surgical History:   Procedure Laterality Date    APPENDECTOMY      HERNIA REPAIR      SINUS SURGERY      TONSILLECTOMY         Social History     Tobacco Use    Smoking status: Former Smoker     Quit date: 11/15/1985     Years since quittin.4    Smokeless tobacco: Never Used   Substance and Sexual Activity    Alcohol use: No     Comment: rare    Drug use: Not on file    Sexual activity: Not on file       Medications  She has a current medication list which includes the following prescription(s): alprazolam, biotin, clotrimazole-betamethasone 1-0.05%, fluticasone propionate, hydrocortisone, multivit with minerals/lutein, multivitamin with minerals, vit a/vit c/vit e/zinc/copper, vitamin d, and zinc gluconate.      Allergies  Review of patient's allergies indicates:   Allergen Reactions    Celestone [betamethasone sodium phosphate] Itching     Flushing to face    Tessalon [benzonatate] Other (See Comments)    Zoloft [sertraline]      Made her feel strange    Amoxicillin Anxiety     Headaches        All medications, allergies, and past history have been reviewed.    Objective:      Vitals:  Vitals - 1 value per visit 3/29/2022 2022 2022   SYSTOLIC 134 - -   DIASTOLIC 66 - -   Pulse 86 - -   Temp - - 97.6   Resp 18 - -   SPO2 96 - -   Weight (lb) 170.2 - 170.64   Weight (kg) 77.2 - 77.4   Height 66 - 66.5   BMI (Calculated) 27.5 - 27.1   VISIT REPORT - - -   Pain Score  - 0 -       Body surface area is 1.91 meters squared.    Physical Exam:    GENERAL  APPEARANCE -  alert, appears stated age and cooperative  BARRIER(S) TO COMMUNICATION -  none VOICE - appropriate for age and gender    INTEGUMENTARY  no  suspicious head and neck lesions    HEENT  HEAD: Normocephalic, without obvious abnormality, atraumatic  FACE: INSPECTION - Symmetric, no signs of trauma, no suspicious lesion(s)  PALPATION -  No masses SALIVARY GLANDS - non-tender with no appreciable mass  STRENGTH - facial symmetry  NECK/THYROID: normal atraumatic, no neck masses, normal thyroid, no jvd    EYES  Normal occular alignment and mobility with no visible nystagmus at rest    EARS/NOSE/MOUTH/THROAT  EARS  PINNAE AND EXTERNAL EARS - no suspicious lesion OTOSCOPIC EXAM (surgical microscopy was not used for visualization/instrumentation): EAR EXAM - Normal ear canals, tympanic membranes and mobility, and middle ear spaces bilaterally.  HEARING - grossly intact to voice/finger rub    NOSE AND SINUSES  EXTERNAL NOSE - Grossly normal for age/sex  SEPTUM - normal/no obstruction on anterior exam without decongestion TURBINATES - within normal limits MUCOSA - pale posterior wide surgical changes of ethmoids and maxillary sinuses, no active drainage or recurrent polyps on anterior exam.     MOUTH AND THROAT   ORAL CAVITY, LIPS, TEETH, GUMS & TONGUE - moist, no suspicious lesions  OROPHARYNX /TONSILS/PHARYNGEAL WALLS/HYPOPHARYNX - no erythema or exudates  NASOPHARYNX - limited mirror exam - unable to visualize due to anatomy/gag  LARYNX -  - limited mirror exam - Supraglottis, false and true vocal cord were normal other than mild to moderate posterior edema and erythema w/o pooling or asymmetry.      CHEST AND LUNG   INSPECTION & AUSCULTATION - normal effort, no stridor    CARDIOVASCULAR  AUSCULTATION & PERIPHERAL VASCULAR - regular rate and rhythm.    NEUROLOGIC  MENTAL STATUS - alert, interactive CRANIAL NERVES - normal    LYMPHATIC  HEAD AND NECK - non-palpable; SUPRACLAVICULAR - deferred; AXILLARY - deferred; INGUINAL - deferred; LIVER/SPLEEN - deferred        Procedure(s):  deferred    Labs:  WBC   Date Value Ref Range Status   10/25/2021 7.49 3.90 - 12.70  K/uL Final     Hemoglobin   Date Value Ref Range Status   10/25/2021 13.4 12.0 - 16.0 g/dL Final     Platelets   Date Value Ref Range Status   10/25/2021 152 150 - 450 K/uL Final     Creatinine   Date Value Ref Range Status   10/25/2021 0.8 0.5 - 1.4 mg/dL Final     TSH   Date Value Ref Range Status   06/19/2018 0.761 0.400 - 4.000 uIU/mL Final     Glucose   Date Value Ref Range Status   10/25/2021 98 70 - 110 mg/dL Final     Hemoglobin A1C   Date Value Ref Range Status   10/25/2021 5.9 (H) 4.0 - 5.6 % Final     Comment:     ADA Screening Guidelines:  5.7-6.4%  Consistent with prediabetes  >or=6.5%  Consistent with diabetes    High levels of fetal hemoglobin interfere with the HbA1C  assay. Heterozygous hemoglobin variants (HbS, HgC, etc)do  not significantly interfere with this assay.   However, presence of multiple variants may affect accuracy.           Assessment:        ICD-10-CM ICD-9-CM   1. Hoarseness  R49.0 784.42   2. Post-nasal drip  R09.82 784.91   3. History of endoscopic sinus surgery  Z98.890 V15.29   4. Laryngopharyngeal reflux (LPR)  K21.9 478.79   5. Throat clearing  R68.89 784.99              Plan:      Wearing avoids by sipping water throughout the day instead of clearing the throat will reduce sensation of mucus in the throat.  If unable to sip water during the day at least use a lozenge.    Think of food as medicine and try to use more anti-inflammatory foods and avoid inflammatory foods.  Try and identify food triggers for throat clearing and nasal sinus and throat irritation.    Start nasal saline irrigations with a Neti squeeze bottle with distilled water and standard Shaka continue saline throughout the day especially during dry seasons to avoid dryness of the throat and nose as discussed.      We will start omeprazole 20 mg twice daily 30 minutes before breakfast and dinner for 1 month to trying get the irritation of recurrent GERD symptoms and throat clearing under control with the  expectation of weaning this down to an as needed use.  Weight loss, no late night eating, avoiding caffeine, eating smaller meals throughout the day, elevating the head of the bed up onto bricks all can be of help as well.  If reflux symptoms not improving talk to primary care about a GI referral for possible endoscopy to rule out eosinophilic esophagitis or adverse changes of the esophagus associated with reflux as discussed.

## 2022-05-20 ENCOUNTER — OFFICE VISIT (OUTPATIENT)
Dept: OTOLARYNGOLOGY | Facility: CLINIC | Age: 87
End: 2022-05-20
Payer: MEDICARE

## 2022-05-20 VITALS — HEIGHT: 66 IN | WEIGHT: 169.75 LBS | BODY MASS INDEX: 27.28 KG/M2 | TEMPERATURE: 98 F

## 2022-05-20 DIAGNOSIS — R09.89 THROAT CLEARING: ICD-10-CM

## 2022-05-20 DIAGNOSIS — Z98.890 HISTORY OF ENDOSCOPIC SINUS SURGERY: ICD-10-CM

## 2022-05-20 DIAGNOSIS — R09.82 POST-NASAL DRIP: ICD-10-CM

## 2022-05-20 DIAGNOSIS — K21.9 LARYNGOPHARYNGEAL REFLUX (LPR): ICD-10-CM

## 2022-05-20 DIAGNOSIS — R49.0 HOARSENESS: Primary | ICD-10-CM

## 2022-05-20 PROCEDURE — 99213 OFFICE O/P EST LOW 20 MIN: CPT | Mod: PBBFAC,PO | Performed by: OTOLARYNGOLOGY

## 2022-05-20 PROCEDURE — 99999 PR PBB SHADOW E&M-EST. PATIENT-LVL III: ICD-10-PCS | Mod: PBBFAC,,, | Performed by: OTOLARYNGOLOGY

## 2022-05-20 PROCEDURE — 99214 OFFICE O/P EST MOD 30 MIN: CPT | Mod: S$PBB,,, | Performed by: OTOLARYNGOLOGY

## 2022-05-20 PROCEDURE — 99214 PR OFFICE/OUTPT VISIT, EST, LEVL IV, 30-39 MIN: ICD-10-PCS | Mod: S$PBB,,, | Performed by: OTOLARYNGOLOGY

## 2022-05-20 PROCEDURE — 99999 PR PBB SHADOW E&M-EST. PATIENT-LVL III: CPT | Mod: PBBFAC,,, | Performed by: OTOLARYNGOLOGY

## 2022-05-20 NOTE — PATIENT INSTRUCTIONS
Multiple factors as discussed cause clearing the throat and swelling in the back of the larynx.  Most obvious are gross regurgitation/reflux even though there is no esophageal burning/heartburn and chronic postnasal drip which would be expected after a history of prior sinus surgery.    There are no alarming symptoms in fact symptoms are improving and therefore additional workup such as esophagoscopy, esophagram, or even office based laryngoscopy are not recommended at this time.    Long-term use of PPI therapy such as the prescribed omeprazole can have untoward side effects and should be eliminated if possible or at least used at the lowest possible treatment dose.  Given the symptoms are improving we will plan to reduce the omeprazole 20 mg to just morning dosing for now and slowly wean off entirely using Gaviscon Tums etc.  if needed to deal with any rebound heartburn.    Throat-clearing avoidance, vocal strain avoidance, anti-inflammatory dietary changes, and saline rinses and reflux precautions as outlined and discussed or recommended.    Return as needed with any return of symptoms, new symptoms, or any other concerns.      VOCAL HYGIENE AND HYDRATION RECOMMENDATIONS     DO   Drink plenty of waterabout  oz a day! If your urine is pale, you should be well-hydrated.  Try some of these tips to increase hydration as well.  Eat wet snacks such as grapes, melon, cucumbers, apple slices   Reduce intake coffee and other caffeinated and carbonated beverages   Suck on pastille lozenges or sugar free candies (not mentholated lozenges)   Use a room or personal humidifier   Use sinus rinses/irrigations or nasal saline spray   Inhale steam for a few minutes before speaking or singing   Pay attention to how, and how much, you use your voice.   Give yourself vocal breaks throughout the day.   Breathe when talking, take frequent pauses for breath.   Use a microphone when speaking in front of a group of 15 or more to  reduce voice strain.   Learn how to use your voice correctly to get loud with voice therapy techniques.  Stay healthy. Eat right and get plenty of rest. Follow a reflux precaution diet if indicated.   ____________________________________________________________________    REDUCE   Loud talking, yelling, cheering, or screaming. Voice injury can take place over a long time, or all at once.  If you need to talk loud or yell, be sure you are doing it properly.   Clearing your throat and forceful coughing. The vocal folds collect mucus when irritated or inflamed and this can cause the urge to clear your throat. The trauma of clearing the throat creates more inflammation and mucus. See tips above for keeping hydrated to assist with cutting back on throat clearing.  Instead of clearing your throat, try these behaviors until the sensation passes:   Take a sip of water   Silently clear with a hard exhale making an hhh sound   Swallow hard   Drying agents such as anti-histamines or decongestant medications. You should always take medications as prescribed, but keep in mind these will dry you out, so increase your hydration!   ____________________________________________________________________    AVOID   Inhalant irritants such as smoke, strong fumes, and allergens. Take advantage of 1-800-QUIT-NOW if you are ready to stop smoking.   Unnecessary non-speech noises, such as grunting during exercise, loud noises, or excessively high- or low-pitched sounds. Save your voice for talking and singing!   Whispering. Whispering places your vocal folds in a tenser position than usual.         ACID REFLUX   What is acid reflux?    When we eat, food passes from the throat and into the stomach through a tube called the esophagus. At the bottom of the esophagus is a ring of muscles that acts as a valve between the esophagus and stomach, called the lower esophageal sphincter. Smoking, alcohol, and certain types of food may weaken the  sphincter, so it may stop closing properly. The contents in the stomach then may leak back, or reflux, into the esophagus. This problem is called gastroesophageal reflux disease (GERD). Symptoms of GERD include heartburn, belching, regurgitation of stomach contents, and swallowing difficulties.    Sometimes, the stomach acid travels up through the esophagus and spills into the larynx or pharynx (voice box). This is called laryngopharyngeal reflux (LPR) and is irritating to the vocal folds and surrounding tissues. Often, patients with LPR do not experience heartburn as a symptom. More commonly, symptoms of LPR include hoarseness, excessive mucous resulting in frequent throat clearing, post-nasal drip, coughing, throat soreness or burning, choking episodes, difficulty swallowing, and sensation of a lump in the throat.     How is acid reflux treated?   Treatment for acid reflux can involve any combination of medication, lifestyle modifications, and surgery.   Medications. Your doctor may prescribe a proton pump inhibitor (PPI) or an H2 blocker. If you are prescribed a PPI, take in on an empty stomach in the morning 30 minutes prior to eating breakfast. Keep in mind that it may take 4-6 weeks before symptoms begin to resolve, so do not stop medications without consulting your doctor.   Lifestyle and dietary modifications. Eat smaller meals at a slower pace. Avoid over-eating. If you are overweight, try to lose weight. Do not lie down or exercise directly after eating; eat your last meal of the day at least 2-3 hours prior to going to sleep. Avoid tight-fitting clothes. If you are a smoker, reduce or quit smoking. Elevate your head of bed 4-6 inches by putting phone books under the legs at the head of your bed or buy a wedge pillow, but do not use more than two regular pillows as this causes the body to curl and compresses your stomach.     Food group Foods to avoid to reduce reflux   Beverages  Whole milk, 2% milk,  chocolate milk/hot chocolate, alcohol, coffee (regular and decaf), caffeinated tea, mint tea, carbonated beverages, citrus juice    Breads/grains Commercial sweet rolls, doughnuts, croissants, and other high-fat pastries    Fruits and vegetables Fried or cream-style vegetables, tomatoes, tomato-based products, citrus fruits, hot peppers    Soups and seasonings Cream, cheese, tomato-based soups, vinegar    Meats and proteins Fatty or fried meat/fish, rios, sausage, pepperoni, lunch meat, fried eggs    Fats and oil Lard, rios drippings, salt pork, meat drippings, gravies, highly seasoned salad dressings, nuts    Sweets/desserts Anything made with or from chocolate, peppermint, spearmint, whole milk, or cream; high-fat pastries, gum, hard candy         THROAT CLEARING     Why am I clearing my throat so often?   Irritation of the vocal folds and surrounding area can cause the urge to clear your throat. This irritation can be caused by acid reflux, allergies, or environmental factors, as well as from a cold or sore throat. Talk with your doctor about the treatment of possible underlying causes. However, throat clearing can turn into a cycle. You clear your throat after feeling an irritation, which causes more irritation, which causes you to continue clearing your throat, which causes more irritation.     What can I do about it?   Ask a friend or family member to help you keep track - you may not even realize how often you do it.   Replace the throat clearing with a different behavior.   Take a sip of water and then swallow. Repeat until the sensation subsides.  Swallow hard (even without water)   Use the silent throat clear method by making the h sound when you exhale  Breathe in deeply through your nose and exhale on shhh   Hum quietly   Keep yourself hydrated.   Drink plenty of water   Eat wet snacks (grapes, apples, melon, cucumber)   Use a humidifier at home or at work   Suck on hard candies, but avoid too  much sugar and avoid anything with mint, menthol, camphor, or eucaplyptus, as these will have a more irritating effect.      NASAL SALINE    Still saline comes in many preparations including sprays/mists, gels, and rinses.  Different preparations served different purposes.  Saline spray helps to briefly moisturize the nose and help clear mucus.  Saline gels coat the nose for longer protective benefit of keeping the linings the nose moist.  Saline rinses clear the nose and sinuses and a more thorough way in her best used for significant postnasal drip and sinus complaints.  A combination of saline sprays/mists, gels and rinses should be used to address routine nasal clearing and dryness issues as well as flushing for better control of allergy and postnasal drip symptoms.  There is no real risk of over use of nasal saline products.  Saline sprays do not have any of the potential rebound or addiction of nasal decongestant sprays.  Nasal saline sprays and rinses should be used prior to the application of any medicated nasal sprays such as nasal steroids or nasal antihistamine sprays.    Why and How To Start an Anti-Inflammatory Diet    From [facility] February 2, 2022 / Nutrition    The foods you eat (and avoid) impact inflammation inside of your body  FACEBOOKTWITTERLINKEDINPINTERESTEmailinflammation, anti-inflammatory diet, Mediterranean diet  You cut your finger, and it becomes red and inflamed. You hurt your knee, and it gets swollen and inflamed. But what, exactly, does inflammation mean for your insides?    A little bit of inflammation contributes to healing, but when inflammation becomes chronic, it can trigger disease processes. Chronic inflammation can damage your heart, brain and other organs, and it plays a role in nearly every major illness, including cancer, heart disease, Alzheimers disease and depression.    Just like inflammation happens after an injury, that same process can happen within  your body, says registered dietitian Gisel Smith, RD, LD. Certain foods and health conditions can cause inflammation.    But the food we eat -- and dont eat -- can soothe and even prevent that inflammation. Sarah explains the health benefits of an anti-inflammatory diet, as well as where to start, what to stop eating and how to tell if its working.    Who should try an anti-inflammatory diet?  Everyones inflammatory triggers are different, so there are a few reasons you might experience inflammation. Sarah delves deeper.    Chronic illness  If youre living with a chronic health condition, you may be living with chronic inflammation, too. Conditions associated with inflammation include:    Crohns disease.  Heart disease.  High blood pressure.  Irritable bowel syndrome.  Multiple sclerosis.  Obesity.  Psoriasis.  Rheumatoid arthritis.  Type 1 diabetes.  Ulcerative colitis.  We know of some general associations, Sarah says. Refined starches and processed meats are not good for people with heart disease (or anyone); gluten and dairy can further inflame bowel disorders; and nightshades can be inflammatory for arthritis. But each person needs to find their personal triggers.    Food sensitivities  Even if you dont have a chronic condition, you can experience inflammation when you eat foods that youre sensitive to.    When you have an immune response to a food, your antibodies rise, which can cause inflammation, Sarah explains. Your body basically sees that food as a foreign body and starts working against it.    But foods that cause inflammation in one person might not cause it in others -- the way, for example, that you can enjoy the occasional slice of pizza without issue, while doing so causes severe inflammation in your friend who has a gluten sensitivity.    Its not a perfect example, though! Keep in mind that although you may not experience a physical sign of inflammation, all processed  foods can lead to internal inflammation. So keep them to a minimum, even if you dont have a particular sensitivity.    How do you know if youre experiencing inflammation?  This is a tricky one! You might not even realize youre experiencing this type of hidden inflammation within your body, although some physical signs can clue you in to it.    You could have redness, puffiness, skin rashes, swelling in your hands and feet, she says. You can also experience abdominal distention, when your pants feel tight around your waist.    Other clues can include fatigue, weight gain, achy joints and muscles, headaches and gastrointestinal issues. You may also be more prone to getting colds and the flu, and when you do get them, they may linger for weeks.    How to start an anti-inflammatory diet  The foods you eat (and the ones you avoid) can help soothe and even prevent inflammation by quashing your bodys inflammatory responses. But because everyones inflammatory triggers are different, theres no one-size-fits-all anti-inflammatory diet.    The term anti-inflammatory diet doesnt refer to a specific diet regimen but to an overall style of eating, Sarah says. There are, however, some guidelines to follow to eat in a way that reduces the likelihood of inflammation.    1. Scale way back on processed foods  The first key to minimizing inflammation is cutting foods that cause it. An anti-inflammatory diet is one that includes minimally processed foods, Sarah says. That typically means staying away from anything that comes in a box or a bag, or anything that has a laundry list of ingredients -- especially if they start with sugar, salt or a processed oil and include ingredients you dont recognize.    Examples include:    Sweets, like commercial baked goods, pre-packaged desserts, ice cream and candy.  Snack foods, like potato chips and microwave popcorn.  Processed meats, including rios, sausage, hot dogs,  bologna, pepperoni and salami.  Processed cheeses, like carlito cheese dip and American cheese slices.  Sugary beverages, including soda and sports drinks.  Fried foods, like fried chicken and French fries.  Even so-called healthy snacks like granola bars, trail mix and baked chips can have a lot of processed ingredients, including added sodium and sugar.    Theres no real nutritional benefit to them, so youre not lacking anything when you cut them out, Sarah says.    2. Focus on whole foods  When youve cut out the processed stuff, whats left? Whole foods, of course!    A whole food is a one-ingredient food, an entire entity: an apple, an orange, a cucumber, Sarah says. In addition to fruits and vegetables, other examples include:    Brown or wild rice.  Chicken/turkey breast.  Eggs.  Fish (especially oily fish, such as salmon, tuna, herring or mackerel).  Legumes, like dried beans and peas.  Nuts and seeds.  Oats.  In short, if you can find it in nature, its probably a whole food.    So do any processed foods make the grade? Foods that have more than one ingredient can still be made up of whole foods -- for example, store-bought hummus, dried fruit and nut snack mix or a pasta sauce, Sarah says. The rincon is to always review the ingredient list.    That means choosing breads and pastas that are minimally processed, minimally preserved and made with whole grains.    3. Try a diet proven to reduce inflammation  Again, theres no one-size-fits-all anti-inflammatory diet. But two styles of eating have been shown to help: the Mediterranean Diet and the DASH Diet.    Positive research reports that these diets are successful in reducing inflammation, as well as cholesterol, weight, blood pressure and blood sugar, Sarah says. She explains each of them.     The Mediterranean Diet  Thought to be the heart-healthiest of diets, the Mediterranean Diet is a style of eating popular among people who live along  [place]. A foundation of this diet is fish high in omega-3 fatty acid, which has been proven to reduce inflammation.    The Mediterranean Diet has been shown to be anti-inflammatory because of its focus on whole foods and omega-3 fatty acids, Sarah says. It also eliminates processed oils, like cottonseed and soybean oil, which are found in many ultra-processed foods.    The DASH Diet  DASH, which stands for Dietary Approaches to Stop Hypertension, is a diet designed to reduce high blood pressure. This diet has been shown to reduce inflammation, probably because it reduces blood pressure and promotes weight loss, Sarah notes. Remember, both high blood pressure and obesity are associated with inflammation.    Like the Mediterranean Diet, the DASH Diet focuses on whole foods and limits protein, sweets and processed foods. But DASH includes a bit more dairy, and it doesnt specifically encourage fish or extra-virgin olive oil.    Is vegetarianism anti-inflammatory?  Though meat can be inflammatory, keep in mind that cutting animal products doesnt necessarily mean eating healthfully.    Eating a plant-based diet can help suppress inflammation, Sarah says, but vegetarian, pescetarian and even vegan diets can still include foods like potato chips, fries and cookies. To see anti-inflammatory benefits, you have to stick to whole foods.    4. If needed, try an elimination diet  If youve cut out processed foods but still experience symptoms of inflammation, you may need to go a step further.    Finding the right anti-inflammatory diet for you is a matter of personalization and finding the foods that trigger your inflammation, Sarah explains. The best way to start is by trying an elimination diet and slowly cutting out potential trigger foods one by one.    Food sensitivity tests can help identify which foods increase your bodys antibody response, too, which may be helpful if you cant seem  to determine a culprit on your own.    Do anti-inflammatory diets work?  The biggest hint that your anti-inflammatory diet is working is if you start feeling better, so keep an eye on your symptoms and look for positive changes to your health.    There are a lot of different ways your body can react to an anti-inflammatory diet, Sarah says. They include:    Clearer skin.  Decreased muscle or joint pain.  Decreased swelling in your hands and feet.  Fewer headaches.  Improved gastrointestinal symptoms (diarrhea, gas, nausea, stomach pain).  Improved sleep.  Less anxiety, stress and/or brain fog.  Less bloating.  Lower blood pressure.  Lower blood sugar.  More energy.  Weight loss.  How long does it take to see results?  Starting an anti-inflammatory diet isnt a magic pill. Your results will vary based on the severity of your intolerance and inflammation.    Drastic changes never lead to long-term success, so give yourself three to six months to make diet changes and to begin to see results, Sarah advises. Begin by making small changes that you know will be impactful, and then slowly continue to add on.    In some cases, if you significantly react to a certain food, you may see results as soon as two to three weeks after eliminating that food from your diet.    This can be very encouraging and motivating, Sarah says. Patients often tell me that they never realized how bad they felt until they changed their diet and began to feel so much better.

## 2022-05-20 NOTE — PROGRESS NOTES
[facility]    Subjective:      Patient: Peg Moreno Patient PCP: Earnestine Hernandez MD         :  1935     Sex:  female      MRN:  [id number]          Date of Visit: 2022      Chief Complaint: Sore Throat      2022 follow-up visit:  1 month follow-up visit with a history of prior sinusitis and sinus surgery chronic throat clearing and globus sensation diagnosed by others with LPR treated with omeprazole 20 mg b.i.d., conservative throat clearing and reflux precautions, discussion of anti-inflammatory dietary changes for mirror findings of mild to moderate posterior edema and erythema w/o pooling or asymmetry.  Returns here today with symptoms much improved.  Using her rinses twice a day taking Prilosec twice a day and has modified her diet and lifestyle.  No difficulty swallowing.  Voice normal.  She does have occasional regurgitation of nonacidic nonmetallic liquid but no regurgitation of food.  No voice changes.  No melena hemoptysis hematochezia or hematemesis.  No discoloration and nasal drainage no sinus pressure pain or epistaxis.    2022 initial consultation: Peg Moreno is a 86 y.o. female former smoker who was referred to me by Dr. Earnestine Hernandez in consultation for Nasal Congestion.  She has a reported history of allergic rhinitis and trigeminal neuralgia with history of sinus surgery in the days prior to endoscopic sinus surgery who had a a mild asymptomatic case of COVID post vaccination and boosting in January which resulted in persistent mucus in the throat snorting and clearing.  She feels like she needs to bring up mucus and will sometimes bring up white mucus into her mouth.  She is not having any new rhinitis drainage from the nose loss of smell nasal obstruction blood or pus.  There is no fever shortness of breath or cough.  There is some mild voice change.  She was treated by Dr. Mtz in with Flonase Zyrtec mucolytics and a 2 week course of  over-the-counter Prilosec.  Between all of these she felt that the Prilosec seemed to help quite a bit.  She saw an ENT doctor out of the area when she was traveling who did what sounds like a nasolaryngoscopy with findings of reflux.  She was not offered treatment for reflux at that time.  She is also going through a lot of stress and feels that the stress and anxiety may be manifesting in her throat as well.    Review of Systems   Constitutional: Negative.    HENT: Positive for facial swelling, postnasal drip, sore throat (globus) and voice change. Negative for congestion, ear discharge, rhinorrhea, sinus pressure, sinus pain and sneezing.    Eyes: Negative.    Respiratory: Negative.    Cardiovascular: Negative.    Gastrointestinal: Negative.    Endocrine: Negative.    Genitourinary: Negative.    Musculoskeletal: Negative.    Allergic/Immunologic: Negative.    Neurological: Negative.  Negative for dizziness and headaches.   Hematological: Negative.    Psychiatric/Behavioral: Negative.         Past Medical History  She has a past medical history of Depression.    Family / Surgical / Social History  Her family history is not on file.    Past Surgical History:   Procedure Laterality Date    APPENDECTOMY      HERNIA REPAIR      SINUS SURGERY      TONSILLECTOMY         Social History     Tobacco Use    Smoking status: Former Smoker     Quit date: 11/15/1985     Years since quittin.5    Smokeless tobacco: Never Used   Substance and Sexual Activity    Alcohol use: No     Comment: rare    Drug use: Not on file    Sexual activity: Not on file       Medications  She has a current medication list which includes the following prescription(s): alprazolam, biotin, clotrimazole-betamethasone 1-0.05%, fluticasone propionate, hydrocortisone, multivit with minerals/lutein, multivitamin with minerals, omeprazole, neilmed sinus rinse complete, vit a/vit c/vit e/zinc/copper, vitamin d, and zinc  gluconate.      Allergies  Review of patient's allergies indicates:   Allergen Reactions    Celestone [betamethasone sodium phosphate] Itching     Flushing to face    Tessalon [benzonatate] Other (See Comments)    Zoloft [sertraline]      Made her feel strange    Amoxicillin Anxiety     Headaches        All medications, allergies, and past history have been reviewed.    Objective:      Vitals:  Vitals - 1 value per visit 3/29/2022 4/22/2022 4/22/2022   SYSTOLIC 134 - -   DIASTOLIC 66 - -   Pulse 86 - -   Temp - - 97.6   Resp 18 - -   SPO2 96 - -   Weight (lb) 170.2 - 170.64   Weight (kg) 77.2 - 77.4   Height 66 - 66.5   BMI (Calculated) 27.5 - 27.1   VISIT REPORT - - -   Pain Score  - 0 -       There is no height or weight on file to calculate BSA.    Physical Exam:    GENERAL  APPEARANCE -  alert, appears stated age and cooperative  BARRIER(S) TO COMMUNICATION -  none VOICE - appropriate for age and gender    INTEGUMENTARY  no suspicious head and neck lesions    HEENT  HEAD: Normocephalic, without obvious abnormality, atraumatic  FACE: INSPECTION - Symmetric, no signs of trauma, no suspicious lesion(s)  PALPATION -  No masses SALIVARY GLANDS - non-tender with no appreciable mass  STRENGTH - facial symmetry  NECK/THYROID: normal atraumatic, no neck masses, normal thyroid, no jvd    EYES  Normal occular alignment and mobility with no visible nystagmus at rest    EARS/NOSE/MOUTH/THROAT  EARS  PINNAE AND EXTERNAL EARS - no suspicious lesion OTOSCOPIC EXAM (surgical microscopy was not used for visualization/instrumentation): EAR EXAM - Normal ear canals, tympanic membranes and mobility, and middle ear spaces bilaterally.  HEARING - grossly intact to voice/finger rub    NOSE AND SINUSES  EXTERNAL NOSE - Grossly normal for age/sex  SEPTUM - normal/no obstruction on anterior exam without decongestion TURBINATES - within normal limits MUCOSA - pale posterior wide surgical changes of ethmoids and maxillary sinuses, no  active drainage or recurrent polyps on anterior exam.     MOUTH AND THROAT   ORAL CAVITY, LIPS, TEETH, GUMS & TONGUE - moist, no suspicious lesions  OROPHARYNX /TONSILS/PHARYNGEAL WALLS/HYPOPHARYNX - no erythema or exudates  NASOPHARYNX - limited mirror exam - unable to visualize due to anatomy/gag  LARYNX -  - limited mirror exam - Supraglottis, false and true vocal cord were normal other than mild to moderate posterior edema and erythema w/o pooling or asymmetry.      CHEST AND LUNG   INSPECTION & AUSCULTATION - normal effort, no stridor    CARDIOVASCULAR  AUSCULTATION & PERIPHERAL VASCULAR - regular rate and rhythm.    NEUROLOGIC  MENTAL STATUS - alert, interactive CRANIAL NERVES - normal    LYMPHATIC  HEAD AND NECK - non-palpable; SUPRACLAVICULAR - deferred; AXILLARY - deferred; INGUINAL - deferred; LIVER/SPLEEN - deferred        Procedure(s):  deferred    Labs:  WBC   Date Value Ref Range Status   10/25/2021 7.49 3.90 - 12.70 K/uL Final     Hemoglobin   Date Value Ref Range Status   10/25/2021 13.4 12.0 - 16.0 g/dL Final     Platelets   Date Value Ref Range Status   10/25/2021 152 150 - 450 K/uL Final     Creatinine   Date Value Ref Range Status   10/25/2021 0.8 0.5 - 1.4 mg/dL Final     TSH   Date Value Ref Range Status   06/19/2018 0.761 0.400 - 4.000 uIU/mL Final     Glucose   Date Value Ref Range Status   10/25/2021 98 70 - 110 mg/dL Final     Hemoglobin A1C   Date Value Ref Range Status   10/25/2021 5.9 (H) 4.0 - 5.6 % Final     Comment:     ADA Screening Guidelines:  5.7-6.4%  Consistent with prediabetes  >or=6.5%  Consistent with diabetes    High levels of fetal hemoglobin interfere with the HbA1C  assay. Heterozygous hemoglobin variants (HbS, HgC, etc)do  not significantly interfere with this assay.   However, presence of multiple variants may affect accuracy.           Assessment:        ICD-10-CM ICD-9-CM   1. Hoarseness  R49.0 784.42   2. Post-nasal drip  R09.82 784.91   3. History of endoscopic  sinus surgery  Z98.890 V15.29   4. Throat clearing  R68.89 784.99   5. Laryngopharyngeal reflux (LPR)  K21.9 478.79              Plan:      Multiple factors as discussed cause clearing the throat and swelling in the back of the larynx.  Most obvious are gross regurgitation/reflux even though there is no esophageal burning/heartburn and chronic postnasal drip which would be expected after a history of prior sinus surgery.    There are no alarming symptoms in fact symptoms are improving and therefore additional workup such as esophagoscopy, esophagram, or even office based laryngoscopy are not recommended at this time.    Long-term use of PPI therapy such as the prescribed omeprazole can have untoward side effects and should be eliminated if possible or at least used at the lowest possible treatment dose.  Given the symptoms are improving we will plan to reduce the omeprazole 20 mg to just morning dosing for now and slowly wean off entirely using Gaviscon Tums etc.  if needed to deal with any rebound heartburn.    Throat-clearing avoidance, vocal strain avoidance, anti-inflammatory dietary changes, and saline rinses and reflux precautions as outlined and discussed or recommended.    Return as needed with any return of symptoms, new symptoms, or any other concerns.

## 2022-06-14 ENCOUNTER — PES CALL (OUTPATIENT)
Dept: ADMINISTRATIVE | Facility: CLINIC | Age: 87
End: 2022-06-14
Payer: MEDICARE

## 2022-06-16 ENCOUNTER — OFFICE VISIT (OUTPATIENT)
Dept: OTOLARYNGOLOGY | Facility: CLINIC | Age: 87
End: 2022-06-16
Payer: MEDICARE

## 2022-06-16 VITALS — HEIGHT: 66 IN | BODY MASS INDEX: 27.42 KG/M2 | TEMPERATURE: 98 F | WEIGHT: 170.63 LBS

## 2022-06-16 DIAGNOSIS — H57.12 PAIN AROUND LEFT EYE: Primary | ICD-10-CM

## 2022-06-16 PROCEDURE — 99999 PR PBB SHADOW E&M-EST. PATIENT-LVL III: CPT | Mod: PBBFAC,,, | Performed by: PHYSICIAN ASSISTANT

## 2022-06-16 PROCEDURE — 99213 OFFICE O/P EST LOW 20 MIN: CPT | Mod: PBBFAC,PO | Performed by: PHYSICIAN ASSISTANT

## 2022-06-16 PROCEDURE — 99213 OFFICE O/P EST LOW 20 MIN: CPT | Mod: S$PBB,,, | Performed by: PHYSICIAN ASSISTANT

## 2022-06-16 PROCEDURE — 99999 PR PBB SHADOW E&M-EST. PATIENT-LVL III: ICD-10-PCS | Mod: PBBFAC,,, | Performed by: PHYSICIAN ASSISTANT

## 2022-06-16 PROCEDURE — 99213 PR OFFICE/OUTPT VISIT, EST, LEVL III, 20-29 MIN: ICD-10-PCS | Mod: S$PBB,,, | Performed by: PHYSICIAN ASSISTANT

## 2022-06-16 NOTE — PROGRESS NOTES
[facility]    Subjective:      Patient: Peg Moreno Patient PCP: Earnestine Hernandez MD         :  1935     Sex:  female      MRN:  [id number]          Date of Visit: 2022      Chief Complaint: Left eye pain/pressure    Patient ID Follow up visit 2022: Peg Moreno is a 86 y.o. female who was self-referred for sinus issues. 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 Pt states that she is being treated by retinal specialist and has been treated for stroke in her left eye. Pt states she has had pressure and pain behind her left eye for around 2 days without new onset acute visual disturbance or associated migraine. Pt is no longer having LPR symptoms and hoarseness has been resolving since last office visit. Pt states that the pain/pressure is behind her left eye and she was unsure of whether to follow up with ENT or with her eye doctor. Pt denies fever/chills, rhinorrhea, PND, or any other areas of facial pressure/pain at this visit.     ENT MD Dr. Keenan Hernandez 2022 follow-up visit:  1 month follow-up visit with a history of prior sinusitis and sinus surgery chronic throat clearing and globus sensation diagnosed by others with LPR treated with omeprazole 20 mg b.i.d., conservative throat clearing and reflux precautions, discussion of anti-inflammatory dietary changes for mirror findings of mild to moderate posterior edema and erythema w/o pooling or asymmetry.  Returns here today with symptoms much improved.  Using her rinses twice a day taking Prilosec twice a day and has modified her diet and lifestyle.  No difficulty swallowing.  Voice normal.  She does have occasional regurgitation of nonacidic nonmetallic liquid but no regurgitation of food.  No voice changes.  No melena hemoptysis hematochezia or hematemesis.  No discoloration and nasal drainage no sinus pressure pain or epistaxis.     ENT MD Dr. Keenan Whitehead 2022 initial  consultation: Peg Moreno is a 86 y.o. female former smoker who was referred to me by Dr. Earnestine Hernandez in consultation for Nasal Congestion.  She has a reported history of allergic rhinitis and trigeminal neuralgia with history of sinus surgery in the days prior to endoscopic sinus surgery who had a a mild asymptomatic case of COVID post vaccination and boosting in January which resulted in persistent mucus in the throat snorting and clearing.  She feels like she needs to bring up mucus and will sometimes bring up white mucus into her mouth.  She is not having any new rhinitis drainage from the nose loss of smell nasal obstruction blood or pus.  There is no fever shortness of breath or cough.  There is some mild voice change.  She was treated by Dr. Mtz in with Flonase Zyrtec mucolytics and a 2 week course of over-the-counter Prilosec.  Between all of these she felt that the Prilosec seemed to help quite a bit.  She saw an ENT doctor out of the area when she was traveling who did what sounds like a nasolaryngoscopy with findings of reflux.  She was not offered treatment for reflux at that time.  She is also going through a lot of stress and feels that the stress and anxiety may be manifesting in her throat as well.    Review of Systems   Constitutional: Negative for chills and fever.   HENT: Positive for sinus pain (pain/pressure around left eye). Negative for postnasal drip, rhinorrhea and voice change.       Past Medical History  She has a past medical history of Depression.    Family History  Her family history is not on file.    Past Surgical History:   Procedure Laterality Date    APPENDECTOMY      HERNIA REPAIR      SINUS SURGERY      TONSILLECTOMY       Social History     Tobacco Use    Smoking status: Former Smoker     Quit date: 11/15/1985     Years since quittin.6    Smokeless tobacco: Never Used   Substance and Sexual Activity    Alcohol use: No     Comment: rare    Drug  use: Not on file    Sexual activity: Not on file     Medications  She has a current medication list which includes the following prescription(s): alprazolam, biotin, clotrimazole-betamethasone 1-0.05%, fluticasone propionate, hydrocortisone, multivit with minerals/lutein, multivitamin with minerals, omeprazole, neilmed sinus rinse complete, vit a/vit c/vit e/zinc/copper, vitamin d, and zinc gluconate.    Review of patient's allergies indicates:   Allergen Reactions    Celestone [betamethasone sodium phosphate] Itching     Flushing to face    Tessalon [benzonatate] Other (See Comments)    Zoloft [sertraline]      Made her feel strange    Amoxicillin Anxiety     Headaches      All medications, allergies, and past history have been reviewed.    Objective:      Vitals:  Vitals - 1 value per visit 5/20/2022 6/16/2022 6/16/2022   SYSTOLIC - - -   DIASTOLIC - - -   Pulse - - -   Temp 98.1 - 98.3   Resp - - -   SPO2 - - -   Weight (lb) 169.75 - 170.64   Weight (kg) 77 - 77.4   Height 66 - 66   BMI (Calculated) 27.4 - 27.6   VISIT REPORT - - -   Pain Score  - 3 -       Body surface area is 1.9 meters squared.  Physical Exam  Constitutional:       General: She is not in acute distress.     Appearance: Normal appearance. She is not ill-appearing.   HENT:      Head: Normocephalic and atraumatic.      Right Ear: Tympanic membrane, ear canal and external ear normal.      Left Ear: Tympanic membrane, ear canal and external ear normal.      Nose: Septal deviation (left) present.      Right Sinus: No maxillary sinus tenderness or frontal sinus tenderness.      Left Sinus: No maxillary sinus tenderness or frontal sinus tenderness.      Mouth/Throat:      Lips: Pink. No lesions.      Mouth: Mucous membranes are moist. No oral lesions.      Tongue: No lesions.      Palate: No lesions.      Pharynx: Oropharynx is clear. Uvula midline. No pharyngeal swelling, oropharyngeal exudate, posterior oropharyngeal erythema or uvula swelling.    Eyes:      General:         Right eye: No discharge.         Left eye: No discharge.      Extraocular Movements: Extraocular movements intact.      Conjunctiva/sclera: Conjunctivae normal.   Pulmonary:      Effort: Pulmonary effort is normal.   Neurological:      General: No focal deficit present.      Mental Status: She is alert and oriented to person, place, and time. Mental status is at baseline.   Psychiatric:         Mood and Affect: Mood normal.         Behavior: Behavior normal.         Thought Content: Thought content normal.         Judgment: Judgment normal.       Labs:  WBC   Date Value Ref Range Status   10/25/2021 7.49 3.90 - 12.70 K/uL Final     Eosinophil %   Date Value Ref Range Status   10/25/2021 1.3 0.0 - 8.0 % Final     Eos #   Date Value Ref Range Status   10/25/2021 0.1 0.0 - 0.5 K/uL Final     Platelets   Date Value Ref Range Status   10/25/2021 152 150 - 450 K/uL Final     Glucose   Date Value Ref Range Status   10/25/2021 98 70 - 110 mg/dL Final     All lab results, imaging results, and data have been reviewed.    Assessment:        ICD-10-CM ICD-9-CM   1. Pain around left eye  H57.12 379.91            Plan:      Pain around left eye  -No pain/pressure to palpation of sinuses. Due to new onset of pain/pressure around left eye x 2 days without new onset of visual defect, I have advised pt to follow up with her eye doctor due to her h/o macular degeneration and stroke in left eye as reported by pt. Pt advised to use jesús med sinus rinse twice daily for sinus hygiene and to follow up with her eye doctor. Pt advised that should she have negative screening at eye doctor and persistent pain/pressure around her left eye, then she is to follow up in office. Pt may follow up sooner for ENT issues/concerns.

## 2022-07-20 ENCOUNTER — PATIENT MESSAGE (OUTPATIENT)
Dept: FAMILY MEDICINE | Facility: CLINIC | Age: 87
End: 2022-07-20
Payer: MEDICARE

## 2022-08-23 ENCOUNTER — OFFICE VISIT (OUTPATIENT)
Dept: FAMILY MEDICINE | Facility: CLINIC | Age: 87
End: 2022-08-23
Payer: MEDICARE

## 2022-08-23 ENCOUNTER — HOSPITAL ENCOUNTER (OUTPATIENT)
Dept: RADIOLOGY | Facility: HOSPITAL | Age: 87
Discharge: HOME | End: 2022-08-23
Attending: INTERNAL MEDICINE
Payer: MEDICARE

## 2022-08-23 VITALS
HEIGHT: 66 IN | SYSTOLIC BLOOD PRESSURE: 134 MMHG | HEART RATE: 87 BPM | RESPIRATION RATE: 19 BRPM | DIASTOLIC BLOOD PRESSURE: 66 MMHG | BODY MASS INDEX: 26.98 KG/M2 | WEIGHT: 167.88 LBS | OXYGEN SATURATION: 97 %

## 2022-08-23 DIAGNOSIS — M54.50 ACUTE LEFT-SIDED LOW BACK PAIN, UNSPECIFIED WHETHER SCIATICA PRESENT: Primary | ICD-10-CM

## 2022-08-23 DIAGNOSIS — M54.50 ACUTE RIGHT-SIDED LOW BACK PAIN, UNSPECIFIED WHETHER SCIATICA PRESENT: ICD-10-CM

## 2022-08-23 PROCEDURE — 72100 X-RAY EXAM L-S SPINE 2/3 VWS: CPT | Mod: TC,PN

## 2022-08-23 PROCEDURE — 72100 XR LUMBAR SPINE AP AND LATERAL: ICD-10-PCS | Mod: 26,,, | Performed by: RADIOLOGY

## 2022-08-23 PROCEDURE — 99999 PR PBB SHADOW E&M-EST. PATIENT-LVL IV: ICD-10-PCS | Mod: PBBFAC,,, | Performed by: INTERNAL MEDICINE

## 2022-08-23 PROCEDURE — 99214 OFFICE O/P EST MOD 30 MIN: CPT | Mod: PBBFAC,PN | Performed by: INTERNAL MEDICINE

## 2022-08-23 PROCEDURE — 72100 X-RAY EXAM L-S SPINE 2/3 VWS: CPT | Mod: 26,,, | Performed by: RADIOLOGY

## 2022-08-23 PROCEDURE — 99214 PR OFFICE/OUTPT VISIT, EST, LEVL IV, 30-39 MIN: ICD-10-PCS | Mod: S$PBB,,, | Performed by: INTERNAL MEDICINE

## 2022-08-23 PROCEDURE — 99999 PR PBB SHADOW E&M-EST. PATIENT-LVL IV: CPT | Mod: PBBFAC,,, | Performed by: INTERNAL MEDICINE

## 2022-08-23 PROCEDURE — 99214 OFFICE O/P EST MOD 30 MIN: CPT | Mod: S$PBB,,, | Performed by: INTERNAL MEDICINE

## 2022-08-23 RX ORDER — TIZANIDINE 2 MG/1
4 TABLET ORAL EVERY 6 HOURS PRN
Qty: 10 TABLET | Refills: 0 | Status: SHIPPED | OUTPATIENT
Start: 2022-08-23 | End: 2022-09-02

## 2022-08-23 NOTE — PROGRESS NOTES
Assessment and Plan:    1. Acute left-sided low back pain, unspecified whether sciatica present  - X-Ray Lumbar Spine AP And Lateral; Future  - tiZANidine (ZANAFLEX) 2 MG tablet; Take 2 tablets (4 mg total) by mouth every 6 (six) hours as needed (back pain).  Dispense: 10 tablet; Refill: 0    Discussed in detail that I am not certain at this time what exactly was causing the abdominal/pelvic pain that she was having last month. With the concurrent episode of diarrhea and location of the pain (along with negative  workup), I do wonder about mild diverticulitis that has since resolved. Regardless of initial cause, this pain has since resolved. The pain she is having currently sounds most consistent with MSK origin. Discussed obtaining XR to r/o fracture and discussed options for treating this. No interest in PT at this time. Will use heat, tylenol, and low dose tizanidine as needed. If worsening, would consider PT +/- MRI.  ______________________________________________________________________  Subjective:    Chief Complaint:  Low back and pelvic pain    HPI:  Peg is a 86 y.o. year old female here for evaluation of low back and pelvic pain.     Pain originally started more than a month ago. Lower back and left side on the front. At the time described it as feeling like her ovaries were pressing into her bladder. Had to urinate more frequently and was having bladder leaks. She saw her gynecologist (Serjio) at that time who checked her urine and did a pelvic exam with no abnormalities identified. Had a transvaginal US which she reports she was told was normal. Recommended that she see GI. She had scheduled apt to see Dr. Haney a few days later, but patient states that she cancelled this because the weather was bad.     She reports today that the pain has improved in the front, but she is still having some pain in her back. The pain in her abdomen was described as crampy, she had originally thought this  felt similar to menstrual cramps. She was treating it with heat across her back and pelvis.     Currently the pain in her back is intermittent. Definitely worse with sitting in certain positions. No changes in her stool currently, though she did have one episode of mild diarrhea lasting a few hours around the time that the pain started.     Medications:  Current Outpatient Medications on File Prior to Visit   Medication Sig Dispense Refill    alprazolam (XANAX) 0.25 MG tablet Take 1 tablet (0.25 mg total) by mouth daily as needed for Anxiety (take 1/2 to 1 tablet as needed). 30 tablet 0    biotin 10,000 mcg Cap Take by mouth.      clotrimazole-betamethasone 1-0.05% (LOTRISONE) cream Apply topically 2 (two) times daily. 60 g 0    hydrocortisone 2.5 % cream Apply topically 2 (two) times daily. 28 g 5    multivit with minerals/lutein (MULTIVITAMIN 50 PLUS ORAL) Take 1 tablet by mouth.      omeprazole (PRILOSEC) 20 MG capsule 20 mg p.o. b.i.d. 30 minutes before breakfast and dinner wean off as tolerated 60 capsule 2    sod chlor-bicarb-squeez bottle (NEILMED SINUS RINSE COMPLETE) pkdv 1 Units by sinus irrigation route 2 (two) times a day. Not right before bed 1 each 11    vit A/vit C/vit E/zinc/copper (PRESERVISION AREDS ORAL) Take 1 tablet by mouth once daily.      vitamin D (VITAMIN D3) 1000 units Tab Take 1,000 Units by mouth once daily.      zinc gluconate 50 mg tablet Take 50 mg by mouth once daily.      [DISCONTINUED] fluticasone propionate (FLONASE) 50 mcg/actuation nasal spray 1 spray by Each Nostril route once daily.      [DISCONTINUED] multivitamin with minerals tablet Take 1 tablet by mouth once daily. Hair, skin, and nails vitamin       No current facility-administered medications on file prior to visit.       Review of Systems:  Review of Systems   Constitutional: Negative for chills and fever.   Gastrointestinal: Negative for abdominal pain, constipation, diarrhea, nausea and vomiting.  "  Genitourinary: Negative for difficulty urinating, dysuria, pelvic pain, vaginal bleeding, vaginal discharge and vaginal pain.   Musculoskeletal: Positive for back pain. Negative for gait problem.       Past Medical History:  Past Medical History:   Diagnosis Date    Depression        Objective:    Vitals:  Vitals:    08/23/22 0908   BP: 134/66   Pulse: 87   Resp: 19   SpO2: 97%   Weight: 76.1 kg (167 lb 14.1 oz)   Height: 5' 6" (1.676 m)   PainSc:   3       Physical Exam  Vitals reviewed.   Constitutional:       General: She is not in acute distress.     Appearance: She is well-developed.   Eyes:      General:         Right eye: No discharge.         Left eye: No discharge.      Conjunctiva/sclera: Conjunctivae normal.   Cardiovascular:      Rate and Rhythm: Normal rate and regular rhythm.   Pulmonary:      Effort: Pulmonary effort is normal. No respiratory distress.   Abdominal:      General: Abdomen is flat. Bowel sounds are normal.      Palpations: Abdomen is soft.      Tenderness: There is no abdominal tenderness.       Musculoskeletal:        Back:    Skin:     General: Skin is warm and dry.   Neurological:      Mental Status: She is alert and oriented to person, place, and time.   Psychiatric:         Behavior: Behavior normal.         Thought Content: Thought content normal.         Judgment: Judgment normal.         Data:  Impression:     1. Lumbar scoliosis.  2. Multilevel degenerative disc disease most severe at the L5-S1 level.  3. L4-5 and L5-S1 degenerative facet arthrosis with mild anterior L4 subluxation.       Earnestine Hernandez MD  Internal Medicine    "

## 2022-08-25 ENCOUNTER — PATIENT MESSAGE (OUTPATIENT)
Dept: FAMILY MEDICINE | Facility: CLINIC | Age: 87
End: 2022-08-25
Payer: MEDICARE

## 2022-10-21 ENCOUNTER — TELEPHONE (OUTPATIENT)
Dept: FAMILY MEDICINE | Facility: CLINIC | Age: 87
End: 2022-10-21
Payer: MEDICARE

## 2022-10-21 ENCOUNTER — PATIENT MESSAGE (OUTPATIENT)
Dept: FAMILY MEDICINE | Facility: CLINIC | Age: 87
End: 2022-10-21
Payer: MEDICARE

## 2023-05-01 ENCOUNTER — TELEPHONE (OUTPATIENT)
Dept: DERMATOLOGY | Facility: CLINIC | Age: 88
End: 2023-05-01
Payer: MEDICARE

## 2023-05-01 ENCOUNTER — OFFICE VISIT (OUTPATIENT)
Dept: DERMATOLOGY | Facility: CLINIC | Age: 88
End: 2023-05-01
Payer: MEDICARE

## 2023-05-01 DIAGNOSIS — L81.4 LENTIGINES: Primary | ICD-10-CM

## 2023-05-01 PROCEDURE — 99202 PR OFFICE/OUTPT VISIT, NEW, LEVL II, 15-29 MIN: ICD-10-PCS | Mod: 95,,, | Performed by: DERMATOLOGY

## 2023-05-01 PROCEDURE — 99202 OFFICE O/P NEW SF 15 MIN: CPT | Mod: 95,,, | Performed by: DERMATOLOGY

## 2023-05-01 NOTE — PROGRESS NOTES
Subjective:      Patient ID:  Peg Moreno is a 87 y.o. female who presents for No chief complaint on file.    HPI    Established patient.  Virtual encounter.   Here for new issue - brown spots at cheek, concerned for melanoma.   No personal or known family hx skin cancer.      Review of Systems    Objective:   Physical Exam   Skin:             Diagram Legend     Erythematous scaling macule/papule c/w actinic keratosis       Vascular papule c/w angioma      Pigmented verrucoid papule/plaque c/w seborrheic keratosis      Yellow umbilicated papule c/w sebaceous hyperplasia      Irregularly shaped tan macule c/w lentigo     1-2 mm smooth white papules consistent with Milia      Movable subcutaneous cyst with punctum c/w epidermal inclusion cyst      Subcutaneous movable cyst c/w pilar cyst      Firm pink to brown papule c/w dermatofibroma      Pedunculated fleshy papule(s) c/w skin tag(s)      Evenly pigmented macule c/w junctional nevus     Mildly variegated pigmented, slightly irregular-bordered macule c/w mildly atypical nevus      Flesh colored to evenly pigmented papule c/w intradermal nevus       Pink pearly papule/plaque c/w basal cell carcinoma      Erythematous hyperkeratotic cursted plaque c/w SCC      Surgical scar with no sign of skin cancer recurrence      Open and closed comedones      Inflammatory papules and pustules      Verrucoid papule consistent consistent with wart     Erythematous eczematous patches and plaques     Dystrophic onycholytic nail with subungual debris c/w onychomycosis     Umbilicated papule    Erythematous-base heme-crusted tan verrucoid plaque consistent with inflamed seborrheic keratosis     Erythematous Silvery Scaling Plaque c/w Psoriasis     See annotation        Assessment / Plan:        Lentigines vs macular SK, favored  - Discussed suspected diagnosis, etiology, and treatment options.   - Benign-appearing; however, discussed innate limitation on definitive  diagnosis of pigmented skin lesions via virtual platform.   - Recommend in person skin check to confirm.  - Recommended daily sun protection, including the use of OTC broad-spectrum sunscreen (SPF 30 or greater) and sun-protective clothing.                 Follow up for skin check.    The patient location is: home ([place])  The chief complaint leading to consultation is: spots on face    Visit type: audiovisual    Face to Face time with patient: 5 mins   10 minutes of total time spent on the encounter, which includes face to face time and non-face to face time preparing to see the patient (eg, review of tests), Obtaining and/or reviewing separately obtained history, Documenting clinical information in the electronic or other health record, Independently interpreting results (not separately reported) and communicating results to the patient/family/caregiver, or Care coordination (not separately reported). Each patient to whom he or she provides medical services by telemedicine is:  (1) informed of the relationship between the physician and patient and the respective role of any other health care provider with respect to management of the patient; and (2) notified that he or she may decline to receive medical services by telemedicine and may withdraw from such care at any time.

## 2023-05-02 ENCOUNTER — TELEPHONE (OUTPATIENT)
Dept: DERMATOLOGY | Facility: CLINIC | Age: 88
End: 2023-05-02
Payer: MEDICARE

## 2023-05-02 NOTE — TELEPHONE ENCOUNTER
----- Message from Malinda Guevara MD sent at 5/1/2023  4:52 PM CDT -----  Please skin patient for skin check within the next 3 months. Thanks!

## 2023-06-22 ENCOUNTER — OFFICE VISIT (OUTPATIENT)
Dept: FAMILY MEDICINE | Facility: CLINIC | Age: 88
End: 2023-06-22
Payer: MEDICARE

## 2023-06-22 ENCOUNTER — TELEPHONE (OUTPATIENT)
Dept: FAMILY MEDICINE | Facility: CLINIC | Age: 88
End: 2023-06-22

## 2023-06-22 VITALS
HEIGHT: 66 IN | OXYGEN SATURATION: 97 % | DIASTOLIC BLOOD PRESSURE: 84 MMHG | BODY MASS INDEX: 26.66 KG/M2 | RESPIRATION RATE: 16 BRPM | WEIGHT: 165.88 LBS | SYSTOLIC BLOOD PRESSURE: 116 MMHG | HEART RATE: 80 BPM

## 2023-06-22 DIAGNOSIS — R73.01 IMPAIRED FASTING GLUCOSE: ICD-10-CM

## 2023-06-22 DIAGNOSIS — N39.41 URGE INCONTINENCE: ICD-10-CM

## 2023-06-22 DIAGNOSIS — D50.9 IRON DEFICIENCY ANEMIA, UNSPECIFIED IRON DEFICIENCY ANEMIA TYPE: ICD-10-CM

## 2023-06-22 DIAGNOSIS — I70.0 AORTIC ATHEROSCLEROSIS: ICD-10-CM

## 2023-06-22 DIAGNOSIS — F41.9 ANXIETY: ICD-10-CM

## 2023-06-22 DIAGNOSIS — G62.9 NEUROPATHY: ICD-10-CM

## 2023-06-22 DIAGNOSIS — E78.5 HYPERLIPIDEMIA, UNSPECIFIED HYPERLIPIDEMIA TYPE: Primary | ICD-10-CM

## 2023-06-22 DIAGNOSIS — R60.0 LOCALIZED EDEMA: ICD-10-CM

## 2023-06-22 DIAGNOSIS — D69.6 THROMBOCYTOPENIA, UNSPECIFIED: ICD-10-CM

## 2023-06-22 DIAGNOSIS — N32.81 OAB (OVERACTIVE BLADDER): ICD-10-CM

## 2023-06-22 DIAGNOSIS — K21.9 LARYNGOPHARYNGEAL REFLUX (LPR): ICD-10-CM

## 2023-06-22 PROCEDURE — 99214 PR OFFICE/OUTPT VISIT, EST, LEVL IV, 30-39 MIN: ICD-10-PCS | Mod: S$PBB,,, | Performed by: INTERNAL MEDICINE

## 2023-06-22 PROCEDURE — 99999 PR PBB SHADOW E&M-EST. PATIENT-LVL IV: CPT | Mod: PBBFAC,,, | Performed by: INTERNAL MEDICINE

## 2023-06-22 PROCEDURE — 99999 PR PBB SHADOW E&M-EST. PATIENT-LVL IV: ICD-10-PCS | Mod: PBBFAC,,, | Performed by: INTERNAL MEDICINE

## 2023-06-22 PROCEDURE — 99214 OFFICE O/P EST MOD 30 MIN: CPT | Mod: PBBFAC,PN | Performed by: INTERNAL MEDICINE

## 2023-06-22 PROCEDURE — 99214 OFFICE O/P EST MOD 30 MIN: CPT | Mod: S$PBB,,, | Performed by: INTERNAL MEDICINE

## 2023-06-22 RX ORDER — OXYBUTYNIN CHLORIDE 5 MG/1
5 TABLET, EXTENDED RELEASE ORAL DAILY
Qty: 30 TABLET | Refills: 2 | Status: SHIPPED | OUTPATIENT
Start: 2023-06-22 | End: 2024-03-08

## 2023-06-22 RX ORDER — ALPRAZOLAM 0.25 MG/1
0.25 TABLET ORAL DAILY PRN
Qty: 30 TABLET | Refills: 0 | Status: SHIPPED | OUTPATIENT
Start: 2023-06-22

## 2023-06-22 RX ORDER — FAMOTIDINE 20 MG/1
20 TABLET, FILM COATED ORAL DAILY
Qty: 30 TABLET | Refills: 2 | Status: SHIPPED | OUTPATIENT
Start: 2023-06-22 | End: 2024-03-08

## 2023-06-22 NOTE — PROGRESS NOTES
Assessment and Plan:    1. Hyperlipidemia, unspecified hyperlipidemia type  - Comprehensive Metabolic Panel; Future  - Lipid Panel; Future    2. Anxiety  - ALPRAZolam (XANAX) 0.25 MG tablet; Take 1 tablet (0.25 mg total) by mouth daily as needed for Anxiety (take 1/2 to 1 tablet as needed).  Dispense: 30 tablet; Refill: 0    3. Iron deficiency anemia, unspecified iron deficiency anemia type  - CBC Auto Differential; Future  - Iron and TIBC; Future  - Ferritin; Future    4. OAB (overactive bladder)  - oxybutynin (DITROPAN-XL) 5 MG TR24; Take 1 tablet (5 mg total) by mouth once daily.  Dispense: 30 tablet; Refill: 2    5. Urge incontinence  - oxybutynin (DITROPAN-XL) 5 MG TR24; Take 1 tablet (5 mg total) by mouth once daily.  Dispense: 30 tablet; Refill: 2    6. Laryngopharyngeal reflux (LPR)  - famotidine (PEPCID) 20 MG tablet; Take 1 tablet (20 mg total) by mouth once daily.  Dispense: 30 tablet; Refill: 2    7. Thrombocytopenia, unspecified  - CBC Auto Differential; Future    8. Aortic atherosclerosis  Not on statin, not interested.     9. Neuropathy  Unclear cause, symptoms sound potentially neuropathic. Not severe and they are improving. Discussed getting labs to r/o things like diabetes and hypothyroidism, then monitoring and considering additional workup if worsening again.   - Hemoglobin A1C; Future  - TSH; Future    10. Impaired fasting glucose  - Hemoglobin A1C; Future    11. Localized edema  - TSH; Future      ______________________________________________________________________  Subjective:    Chief Complaint:  Follow up chronic medical conditions.    HPI:  Peg is a 87 y.o. year old female here to follow up chronic medical conditions.     She notes that she has been having mild burning pain and red skin discoloration on the bottoms of both of her feet for several months. She does get occasional mild swelling of her ankles, L>R. She has no known prior history of diabetes. She does take a  multivitamin. This does overall seem to be improving, but she is not sure why.      She has been having more urge incontinence recently.     Has been having very dry skin, wanted to discuss this.     Notes that she may have to move soon for her son's new job, this is causing increased anxiety. Wanted to discuss getting a refill of the xanax. Uses this rarely. No oversedation.    Medications:  Current Outpatient Medications on File Prior to Visit   Medication Sig Dispense Refill    alprazolam (XANAX) 0.25 MG tablet Take 1 tablet (0.25 mg total) by mouth daily as needed for Anxiety (take 1/2 to 1 tablet as needed). 30 tablet 0    biotin 10,000 mcg Cap Take by mouth.      clotrimazole-betamethasone 1-0.05% (LOTRISONE) cream Apply topically 2 (two) times daily. 60 g 0    hydrocortisone 2.5 % cream Apply topically 2 (two) times daily. 28 g 5    multivit with minerals/lutein (MULTIVITAMIN 50 PLUS ORAL) Take 1 tablet by mouth.      sod chlor-bicarb-squeez bottle (NEILMED SINUS RINSE COMPLETE) pkdv 1 Units by sinus irrigation route 2 (two) times a day. Not right before bed 1 each 11    vit A/vit C/vit E/zinc/copper (PRESERVISION AREDS ORAL) Take 1 tablet by mouth once daily.      vitamin D (VITAMIN D3) 1000 units Tab Take 1,000 Units by mouth once daily.      zinc gluconate 50 mg tablet Take 50 mg by mouth once daily.      [DISCONTINUED] omeprazole (PRILOSEC) 20 MG capsule 20 mg p.o. b.i.d. 30 minutes before breakfast and dinner wean off as tolerated 60 capsule 2     No current facility-administered medications on file prior to visit.       Review of Systems:  Review of Systems   Respiratory:  Negative for shortness of breath and wheezing.    Cardiovascular:  Negative for chest pain and leg swelling.   Gastrointestinal:  Negative for diarrhea, nausea and vomiting.   Genitourinary:  Positive for urgency. Negative for difficulty urinating and dysuria.   Neurological:  Negative for dizziness, syncope and light-headedness.  "    Past Medical History:  Past Medical History:   Diagnosis Date    Depression        Objective:    Vitals:  Vitals:    06/22/23 1113   BP: 116/84   Pulse: 80   Resp: 16   SpO2: 97%   Weight: 75.3 kg (165 lb 14.3 oz)   Height: 5' 6" (1.676 m)       Physical Exam  Vitals reviewed.   Constitutional:       General: She is not in acute distress.     Appearance: She is well-developed.   Eyes:      General:         Right eye: No discharge.         Left eye: No discharge.      Conjunctiva/sclera: Conjunctivae normal.   Cardiovascular:      Rate and Rhythm: Normal rate and regular rhythm.   Pulmonary:      Effort: Pulmonary effort is normal. No respiratory distress.   Skin:     General: Skin is warm and dry.   Neurological:      Mental Status: She is alert and oriented to person, place, and time.   Psychiatric:         Behavior: Behavior normal.         Thought Content: Thought content normal.         Judgment: Judgment normal.       Data:   in 2021      Earnestine Hernandez MD  Internal Medicine    "

## 2023-06-22 NOTE — TELEPHONE ENCOUNTER
----- Message from Joyce Ryder LPN sent at 6/22/2023  3:11 PM CDT -----    ----- Message -----  From: Siri Bettencourt  Sent: 6/22/2023   3:10 PM CDT  To: Angelika ESPINOZA Staff    Type: Needs Medical Advice  Who Called:  sobia fontaine     Best Call Back Number: [phone]  Additional Information: requesting a clarification on rx ALPRAZolam (XANAX) 0.25 MG tablet sts it has 2 different directions please advise thank you

## 2023-06-30 ENCOUNTER — LAB VISIT (OUTPATIENT)
Dept: LAB | Facility: HOSPITAL | Age: 88
End: 2023-06-30
Attending: INTERNAL MEDICINE
Payer: MEDICARE

## 2023-06-30 DIAGNOSIS — D50.9 IRON DEFICIENCY ANEMIA, UNSPECIFIED IRON DEFICIENCY ANEMIA TYPE: ICD-10-CM

## 2023-06-30 DIAGNOSIS — G62.9 NEUROPATHY: ICD-10-CM

## 2023-06-30 DIAGNOSIS — R73.01 IMPAIRED FASTING GLUCOSE: ICD-10-CM

## 2023-06-30 DIAGNOSIS — R60.0 LOCALIZED EDEMA: ICD-10-CM

## 2023-06-30 DIAGNOSIS — E78.5 HYPERLIPIDEMIA, UNSPECIFIED HYPERLIPIDEMIA TYPE: ICD-10-CM

## 2023-06-30 LAB
ALBUMIN SERPL BCP-MCNC: 3.7 G/DL (ref 3.5–5.2)
ALP SERPL-CCNC: 60 U/L (ref 55–135)
ALT SERPL W/O P-5'-P-CCNC: 15 U/L (ref 10–44)
ANION GAP SERPL CALC-SCNC: 14 MMOL/L (ref 8–16)
AST SERPL-CCNC: 19 U/L (ref 10–40)
BASOPHILS # BLD AUTO: 0.04 K/UL (ref 0–0.2)
BASOPHILS NFR BLD: 0.6 % (ref 0–1.9)
BILIRUB SERPL-MCNC: 0.4 MG/DL (ref 0.1–1)
BUN SERPL-MCNC: 24 MG/DL (ref 8–23)
CALCIUM SERPL-MCNC: 9.6 MG/DL (ref 8.7–10.5)
CHLORIDE SERPL-SCNC: 107 MMOL/L (ref 95–110)
CHOLEST SERPL-MCNC: 230 MG/DL (ref 120–199)
CHOLEST/HDLC SERPL: 3.7 {RATIO} (ref 2–5)
CO2 SERPL-SCNC: 24 MMOL/L (ref 23–29)
CREAT SERPL-MCNC: 0.7 MG/DL (ref 0.5–1.4)
DIFFERENTIAL METHOD: ABNORMAL
EOSINOPHIL # BLD AUTO: 0.2 K/UL (ref 0–0.5)
EOSINOPHIL NFR BLD: 3 % (ref 0–8)
ERYTHROCYTE [DISTWIDTH] IN BLOOD BY AUTOMATED COUNT: 17 % (ref 11.5–14.5)
EST. GFR  (NO RACE VARIABLE): >60 ML/MIN/1.73 M^2
ESTIMATED AVG GLUCOSE: 126 MG/DL (ref 68–131)
FERRITIN SERPL-MCNC: 192 NG/ML (ref 20–300)
GLUCOSE SERPL-MCNC: 101 MG/DL (ref 70–110)
HBA1C MFR BLD: 6 % (ref 4–5.6)
HCT VFR BLD AUTO: 39.7 % (ref 37–48.5)
HDLC SERPL-MCNC: 63 MG/DL (ref 40–75)
HDLC SERPL: 27.4 % (ref 20–50)
HGB BLD-MCNC: 12.2 G/DL (ref 12–16)
IMM GRANULOCYTES # BLD AUTO: 0.02 K/UL (ref 0–0.04)
IMM GRANULOCYTES NFR BLD AUTO: 0.3 % (ref 0–0.5)
IRON SERPL-MCNC: 62 UG/DL (ref 30–160)
LDLC SERPL CALC-MCNC: 147 MG/DL (ref 63–159)
LYMPHOCYTES # BLD AUTO: 2 K/UL (ref 1–4.8)
LYMPHOCYTES NFR BLD: 29.4 % (ref 18–48)
MCH RBC QN AUTO: 24.2 PG (ref 27–31)
MCHC RBC AUTO-ENTMCNC: 30.7 G/DL (ref 32–36)
MCV RBC AUTO: 79 FL (ref 82–98)
MONOCYTES # BLD AUTO: 0.8 K/UL (ref 0.3–1)
MONOCYTES NFR BLD: 12.5 % (ref 4–15)
NEUTROPHILS # BLD AUTO: 3.6 K/UL (ref 1.8–7.7)
NEUTROPHILS NFR BLD: 54.2 % (ref 38–73)
NONHDLC SERPL-MCNC: 167 MG/DL
NRBC BLD-RTO: 0 /100 WBC
PLATELET # BLD AUTO: 133 K/UL (ref 150–450)
PMV BLD AUTO: ABNORMAL FL (ref 9.2–12.9)
POTASSIUM SERPL-SCNC: 4.3 MMOL/L (ref 3.5–5.1)
PROT SERPL-MCNC: 6.9 G/DL (ref 6–8.4)
RBC # BLD AUTO: 5.05 M/UL (ref 4–5.4)
SATURATED IRON: 19 % (ref 20–50)
SODIUM SERPL-SCNC: 145 MMOL/L (ref 136–145)
TOTAL IRON BINDING CAPACITY: 321 UG/DL (ref 250–450)
TRANSFERRIN SERPL-MCNC: 217 MG/DL (ref 200–375)
TRIGL SERPL-MCNC: 100 MG/DL (ref 30–150)
TSH SERPL DL<=0.005 MIU/L-ACNC: 2.64 UIU/ML (ref 0.4–4)
WBC # BLD AUTO: 6.71 K/UL (ref 3.9–12.7)

## 2023-06-30 PROCEDURE — 82728 ASSAY OF FERRITIN: CPT | Performed by: INTERNAL MEDICINE

## 2023-06-30 PROCEDURE — 84443 ASSAY THYROID STIM HORMONE: CPT | Performed by: INTERNAL MEDICINE

## 2023-06-30 PROCEDURE — 84466 ASSAY OF TRANSFERRIN: CPT | Performed by: INTERNAL MEDICINE

## 2023-06-30 PROCEDURE — 36415 COLL VENOUS BLD VENIPUNCTURE: CPT | Mod: PN | Performed by: INTERNAL MEDICINE

## 2023-06-30 PROCEDURE — 85025 COMPLETE CBC W/AUTO DIFF WBC: CPT | Performed by: INTERNAL MEDICINE

## 2023-06-30 PROCEDURE — 80061 LIPID PANEL: CPT | Performed by: INTERNAL MEDICINE

## 2023-06-30 PROCEDURE — 80053 COMPREHEN METABOLIC PANEL: CPT | Performed by: INTERNAL MEDICINE

## 2023-06-30 PROCEDURE — 83036 HEMOGLOBIN GLYCOSYLATED A1C: CPT | Performed by: INTERNAL MEDICINE

## 2023-07-09 ENCOUNTER — PATIENT MESSAGE (OUTPATIENT)
Dept: FAMILY MEDICINE | Facility: CLINIC | Age: 88
End: 2023-07-09
Payer: MEDICARE
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
## 2023-07-17 ENCOUNTER — TELEPHONE (OUTPATIENT)
Dept: FAMILY MEDICINE | Facility: CLINIC | Age: 88
End: 2023-07-17
Payer: MEDICARE

## 2023-07-17 NOTE — TELEPHONE ENCOUNTER
Called patient and she stated Cristina had left a vm to go over lab results. I looked at Dr. Elizabeth note and relayed that to the patient. She understood everything fine and stated she would get some iron tablets to start taking.

## 2023-07-17 NOTE — TELEPHONE ENCOUNTER
----- Message from Giovanna Macedo sent at 7/17/2023  9:29 AM CDT -----  Contact: patient  Type:  Patient Returning Call    Who Called:patient     Who Left Message for Patient:    Does the patient know what this is regarding?:yes     Would the patient rather a call back or a response via [facility]? Call     Best Call Back Number:[phone] (home)      Additional Information:

## 2023-11-29 ENCOUNTER — HOSPITAL ENCOUNTER (OUTPATIENT)
Dept: RADIOLOGY | Facility: HOSPITAL | Age: 88
Discharge: HOME | End: 2023-11-29
Attending: NURSE PRACTITIONER
Payer: MEDICARE

## 2023-11-29 ENCOUNTER — OFFICE VISIT (OUTPATIENT)
Dept: FAMILY MEDICINE | Facility: CLINIC | Age: 88
End: 2023-11-29
Payer: MEDICARE

## 2023-11-29 VITALS
TEMPERATURE: 97 F | WEIGHT: 169 LBS | BODY MASS INDEX: 27.28 KG/M2 | OXYGEN SATURATION: 98 % | DIASTOLIC BLOOD PRESSURE: 71 MMHG | HEART RATE: 78 BPM | SYSTOLIC BLOOD PRESSURE: 139 MMHG

## 2023-11-29 DIAGNOSIS — J02.9 PHARYNGITIS, UNSPECIFIED ETIOLOGY: ICD-10-CM

## 2023-11-29 DIAGNOSIS — R09.82 POST-NASAL DRIP: ICD-10-CM

## 2023-11-29 DIAGNOSIS — R05.9 COUGH, UNSPECIFIED TYPE: ICD-10-CM

## 2023-11-29 DIAGNOSIS — J06.9 UPPER RESPIRATORY TRACT INFECTION, UNSPECIFIED TYPE: ICD-10-CM

## 2023-11-29 DIAGNOSIS — J06.9 UPPER RESPIRATORY TRACT INFECTION, UNSPECIFIED TYPE: Primary | ICD-10-CM

## 2023-11-29 DIAGNOSIS — R07.89 CHEST TIGHTNESS: ICD-10-CM

## 2023-11-29 LAB
CTP QC/QA: YES
CTP QC/QA: YES
POC MOLECULAR INFLUENZA A AGN: NEGATIVE
POC MOLECULAR INFLUENZA B AGN: NEGATIVE
SARS-COV-2 RDRP RESP QL NAA+PROBE: NEGATIVE

## 2023-11-29 PROCEDURE — 71046 X-RAY EXAM CHEST 2 VIEWS: CPT | Mod: TC,PO

## 2023-11-29 PROCEDURE — 99999PBSHW POCT INFLUENZA A/B MOLECULAR: Mod: PBBFAC,,,

## 2023-11-29 PROCEDURE — 93010 EKG 12-LEAD: ICD-10-PCS | Mod: S$PBB,,, | Performed by: STUDENT IN AN ORGANIZED HEALTH CARE EDUCATION/TRAINING PROGRAM

## 2023-11-29 PROCEDURE — 99999PBSHW: Mod: PBBFAC,,,

## 2023-11-29 PROCEDURE — 87635 SARS-COV-2 COVID-19 AMP PRB: CPT | Mod: PBBFAC,PO | Performed by: NURSE PRACTITIONER

## 2023-11-29 PROCEDURE — 93005 ELECTROCARDIOGRAM TRACING: CPT | Mod: PBBFAC,PO | Performed by: STUDENT IN AN ORGANIZED HEALTH CARE EDUCATION/TRAINING PROGRAM

## 2023-11-29 PROCEDURE — 71046 XR CHEST PA AND LATERAL: ICD-10-PCS | Mod: 26,,, | Performed by: RADIOLOGY

## 2023-11-29 PROCEDURE — 99214 OFFICE O/P EST MOD 30 MIN: CPT | Mod: S$PBB,,, | Performed by: NURSE PRACTITIONER

## 2023-11-29 PROCEDURE — 99214 PR OFFICE/OUTPT VISIT, EST, LEVL IV, 30-39 MIN: ICD-10-PCS | Mod: S$PBB,,, | Performed by: NURSE PRACTITIONER

## 2023-11-29 PROCEDURE — 87502 INFLUENZA DNA AMP PROBE: CPT | Mod: PBBFAC,PO | Performed by: NURSE PRACTITIONER

## 2023-11-29 PROCEDURE — 93010 ELECTROCARDIOGRAM REPORT: CPT | Mod: S$PBB,,, | Performed by: STUDENT IN AN ORGANIZED HEALTH CARE EDUCATION/TRAINING PROGRAM

## 2023-11-29 PROCEDURE — 71046 X-RAY EXAM CHEST 2 VIEWS: CPT | Mod: 26,,, | Performed by: RADIOLOGY

## 2023-11-29 PROCEDURE — 99999 PR PBB SHADOW E&M-EST. PATIENT-LVL IV: CPT | Mod: PBBFAC,,, | Performed by: NURSE PRACTITIONER

## 2023-11-29 PROCEDURE — 99999PBSHW POCT INFLUENZA A/B MOLECULAR: ICD-10-PCS | Mod: PBBFAC,,,

## 2023-11-29 PROCEDURE — 99214 OFFICE O/P EST MOD 30 MIN: CPT | Mod: PBBFAC,PO,25 | Performed by: NURSE PRACTITIONER

## 2023-11-29 PROCEDURE — 99999 PR PBB SHADOW E&M-EST. PATIENT-LVL IV: ICD-10-PCS | Mod: PBBFAC,,, | Performed by: NURSE PRACTITIONER

## 2023-11-29 RX ORDER — AZELASTINE 1 MG/ML
1 SPRAY, METERED NASAL 2 TIMES DAILY
Qty: 30 ML | Refills: 0 | Status: SHIPPED | OUTPATIENT
Start: 2023-11-29 | End: 2024-03-08

## 2023-11-29 NOTE — PROGRESS NOTES
"Subjective     Patient ID: Peg Moreno is a 88 y.o. female.    Chief Complaint: Cough and Sinusitis    Cough  This is a new problem. The current episode started in the past 7 days. The problem has been unchanged. The problem occurs every few minutes. The cough is Productive of sputum. Associated symptoms include nasal congestion, postnasal drip and rhinorrhea. Pertinent negatives include no chest pain, chills, ear congestion, ear pain, fever, headaches, heartburn, hemoptysis, myalgias, rash, sore throat, shortness of breath, sweats, weight loss or wheezing. Associated symptoms comments: Pt states "chest tightness" this AM; denies CP. Nothing aggravates the symptoms. Treatments tried: Claritin, mucinex, robitussin. The treatment provided mild relief. Her past medical history is significant for environmental allergies. There is no history of asthma, bronchiectasis, bronchitis, COPD, emphysema or pneumonia.     Past Medical History:   Diagnosis Date    Depression      Social History     Socioeconomic History    Marital status:    Tobacco Use    Smoking status: Former     Current packs/day: 0.00     Types: Cigarettes     Quit date: 11/15/1985     Years since quittin.0    Smokeless tobacco: Never   Substance and Sexual Activity    Alcohol use: No     Comment: rare     Past Surgical History:   Procedure Laterality Date    APPENDECTOMY      HERNIA REPAIR      SINUS SURGERY      TONSILLECTOMY         Review of Systems   Constitutional: Negative.  Negative for chills, fever and weight loss.   HENT:  Positive for postnasal drip, rhinorrhea and sinus pressure/congestion. Negative for ear pain and sore throat.    Eyes: Negative.    Respiratory:  Positive for cough. Negative for hemoptysis, shortness of breath and wheezing.    Cardiovascular: Negative.  Negative for chest pain.   Gastrointestinal: Negative.  Negative for heartburn.   Endocrine: Negative.    Genitourinary: Negative.    Musculoskeletal: " Negative.  Negative for myalgias.   Integumentary:  Negative for rash. Negative.   Allergic/Immunologic: Positive for environmental allergies.   Neurological:  Negative for headaches.   Psychiatric/Behavioral: Negative.            Objective     Physical Exam  Vitals and nursing note reviewed.   Constitutional:       Appearance: Normal appearance.   HENT:      Head: Normocephalic.      Right Ear: Tympanic membrane, ear canal and external ear normal.      Left Ear: Tympanic membrane, ear canal and external ear normal.      Nose: Congestion and rhinorrhea present.      Mouth/Throat:      Mouth: Mucous membranes are moist.      Pharynx: Oropharynx is clear.   Eyes:      Conjunctiva/sclera: Conjunctivae normal.      Pupils: Pupils are equal, round, and reactive to light.   Cardiovascular:      Rate and Rhythm: Normal rate and regular rhythm.      Pulses: Normal pulses.      Heart sounds: Normal heart sounds.   Pulmonary:      Effort: Pulmonary effort is normal.      Breath sounds: Normal breath sounds.   Abdominal:      General: Bowel sounds are normal.      Palpations: Abdomen is soft.   Musculoskeletal:         General: Normal range of motion.      Cervical back: Normal range of motion and neck supple.   Skin:     General: Skin is warm and dry.      Capillary Refill: Capillary refill takes 2 to 3 seconds.   Neurological:      Mental Status: She is alert and oriented to person, place, and time.   Psychiatric:         Mood and Affect: Mood normal.         Behavior: Behavior normal.         Thought Content: Thought content normal.         Judgment: Judgment normal.            Assessment and Plan     1. Upper respiratory tract infection, unspecified type  2. Cough, unspecified type  3. Pharyngitis, unspecified etiology  4. Chest tightness  5. Post-nasal drip  -     POCT COVID-19 Rapid Screening  -     POCT Influenza A/B Molecular  -     X-Ray Chest PA And Lateral; Future; Expected date: 11/29/2023        -     IN OFFICE  EKG 12-LEAD (to Muse)        -     azelastine (ASTELIN) 137 mcg (0.1 %) nasal spray; 1 spray (137 mcg total) by Nasal route 2 (two) times daily. for 7 days  Dispense: 30 mL; Refill: 0  Zyrtec OTC as directed  Hydrate well  Rest  Report to ER immediately if symptoms worsen or persist               No follow-ups on file.

## 2023-11-29 NOTE — PATIENT INSTRUCTIONS
Zyrtec OTC as directed  Hydrate well  Rest  Report to ER immediately if symptoms worsen or persist    Rafael Ruvalcaba,     If you are due for any health screening(s) below please notify me so we can arrange them to be ordered and scheduled. Most healthy patients at your age complete them, but you are free to accept or refuse.     If you can't do it, I'll definitely understand. If you can, I'd certainly appreciate it!    All of your core healthy metrics are met.

## 2023-11-30 RX ORDER — AZITHROMYCIN 250 MG/1
TABLET, FILM COATED ORAL
Qty: 6 TABLET | Refills: 0 | Status: SHIPPED | OUTPATIENT
Start: 2023-11-30 | End: 2023-12-05

## 2023-11-30 NOTE — TELEPHONE ENCOUNTER
----- Message from Cortney Navarro DNP sent at 11/30/2023 12:38 PM CST -----  Rx for zpak sent.   ----- Message -----  From: Silvia Mason LPN  Sent: 11/30/2023  10:50 AM CST  To: Cortney Navarro DNP    I see only a nasal spray called in. Is this correct?   ----- Message -----  From: Nicholas Lopez  Sent: 11/30/2023   8:29 AM CST  To: Melanie Barr Staff    Type: Patient Call Back       Who called: self        What is the request in detail: pt needs a call back in regards to medication was suppose to be sent over zpack        Can the clinic reply by [facility]? Yes       Would the patient rather a call back or a response via [facility]? Call back       Best call back number: [phone]

## 2023-12-01 ENCOUNTER — TELEPHONE (OUTPATIENT)
Dept: FAMILY MEDICINE | Facility: CLINIC | Age: 88
End: 2023-12-01
Payer: MEDICARE

## 2023-12-01 DIAGNOSIS — R94.31 ABNORMAL EKG: Primary | ICD-10-CM

## 2023-12-01 NOTE — TELEPHONE ENCOUNTER
Patient just moved here. Seeing Dr. Lam in March to [facility]. Can we do referral to Cardio?

## 2023-12-01 NOTE — TELEPHONE ENCOUNTER
----- Message from Cortney Navarro DNP sent at 12/1/2023  2:52 PM CST -----  EKG abnormal. Consider cardiology. F/U with PCP.

## 2024-01-11 DIAGNOSIS — Z00.00 ENCOUNTER FOR MEDICARE ANNUAL WELLNESS EXAM: ICD-10-CM

## 2024-03-08 ENCOUNTER — OFFICE VISIT (OUTPATIENT)
Dept: FAMILY MEDICINE | Facility: CLINIC | Age: 89
End: 2024-03-08
Payer: MEDICARE

## 2024-03-08 VITALS
WEIGHT: 171.31 LBS | OXYGEN SATURATION: 99 % | BODY MASS INDEX: 27.53 KG/M2 | HEIGHT: 66 IN | HEART RATE: 73 BPM | DIASTOLIC BLOOD PRESSURE: 82 MMHG | SYSTOLIC BLOOD PRESSURE: 134 MMHG

## 2024-03-08 DIAGNOSIS — Z23 NEED FOR VACCINATION: ICD-10-CM

## 2024-03-08 DIAGNOSIS — L98.9 SKIN LESION: ICD-10-CM

## 2024-03-08 DIAGNOSIS — Z13.6 ENCOUNTER FOR LIPID SCREENING FOR CARDIOVASCULAR DISEASE: ICD-10-CM

## 2024-03-08 DIAGNOSIS — Z00.00 ENCOUNTER FOR MEDICAL EXAMINATION TO ESTABLISH CARE: ICD-10-CM

## 2024-03-08 DIAGNOSIS — K80.20 CALCULUS OF GALLBLADDER WITHOUT CHOLECYSTITIS WITHOUT OBSTRUCTION: ICD-10-CM

## 2024-03-08 DIAGNOSIS — Z13.220 ENCOUNTER FOR LIPID SCREENING FOR CARDIOVASCULAR DISEASE: ICD-10-CM

## 2024-03-08 DIAGNOSIS — L85.3 DRY SKIN: ICD-10-CM

## 2024-03-08 DIAGNOSIS — H34.8312 TRIBUTARY (BRANCH) RETINAL VEIN OCCLUSION, RIGHT EYE, STABLE: ICD-10-CM

## 2024-03-08 DIAGNOSIS — D69.6 THROMBOCYTOPENIA, UNSPECIFIED: ICD-10-CM

## 2024-03-08 DIAGNOSIS — Z79.899 ENCOUNTER FOR LONG-TERM (CURRENT) USE OF MEDICATIONS: ICD-10-CM

## 2024-03-08 DIAGNOSIS — K64.9 HEMORRHOIDS, UNSPECIFIED HEMORRHOID TYPE: Primary | ICD-10-CM

## 2024-03-08 DIAGNOSIS — I70.0 AORTIC ATHEROSCLEROSIS: ICD-10-CM

## 2024-03-08 PROCEDURE — 99999 PR PBB SHADOW E&M-EST. PATIENT-LVL IV: CPT | Mod: PBBFAC,,, | Performed by: FAMILY MEDICINE

## 2024-03-08 PROCEDURE — 99214 OFFICE O/P EST MOD 30 MIN: CPT | Mod: S$PBB,,, | Performed by: FAMILY MEDICINE

## 2024-03-08 PROCEDURE — 99214 OFFICE O/P EST MOD 30 MIN: CPT | Mod: PBBFAC,PO | Performed by: FAMILY MEDICINE

## 2024-03-08 RX ORDER — HYDROCORTISONE 25 MG/G
CREAM TOPICAL 2 TIMES DAILY
Qty: 28 G | Refills: 5 | Status: SHIPPED | OUTPATIENT
Start: 2024-03-08

## 2024-03-08 RX ORDER — CLOTRIMAZOLE AND BETAMETHASONE DIPROPIONATE 10; .64 MG/G; MG/G
CREAM TOPICAL 2 TIMES DAILY
Qty: 60 G | Refills: 0 | Status: SHIPPED | OUTPATIENT
Start: 2024-03-08

## 2024-03-08 NOTE — PATIENT INSTRUCTIONS
Follow up in about 6 months (around 9/8/2024), or if symptoms worsen or fail to improve, for Med refills, LAB RESULTS, with Radha Dunn NP.     Dear patient,   As a result of recent federal legislation (The Federal Cures Act), you may receive lab or pathology results from your visit in your [facility] account before your physician is able to contact you. Your physician or their representative will relay the results to you with their recommendations at their soonest availability.     If no improvement in symptoms or symptoms worsen, please be advised to call MD, follow-up at clinic and/or go to ER if becomes severe.    August Lam M.D.        We Offer TELEHEALTH & Same Day Appointments!   Book your Telehealth appointment with me through my nurse or   Clinic appointments on [facility]!    [address] [place] [address]    Office: [phone]   FAX: [phone]    Check out my Facebook Page and Follow Me at: [email or web address]    Check out my website at [email or web address] by clicking on: [email or web address]    To Schedule appointments [email or web address] go to [facility]: [email or web address]

## 2024-03-08 NOTE — PROGRESS NOTES
PLAN:    Assessment & Plan  1. Prediabetes.  She has prediabetes with her sugars. Standing orders were placed for blood work. She will return on Tuesday morning for lab work.  We will plan to monitor hemoglobin A1c at designated intervals 3 to 6 months.  I recommend ongoing Education for diabetic diet and exercise protocol.  We will continue to monitor for side effects.    Please be advised of symptoms to monitor for and to notify me immediately if persistent or worsening.  Follow up with Ophthalmology/Optometry and Podiatry at least annually.      2. Skin lesion.  It looks benign, but it is hard to tell with the makeup on. She was advised not to wear any makeup on that spot. I will refer her to a dermatologist.    3. Dry skin.  She was advised to increase her water intake.  Follow-up with Dermatology.    4. Hair loss.  Her hairline has receded so badly. She was recommended to try a different vitamin with vitamin A and biotin.  Follow-up with Dermatology.    5. Health maintenance.  She is due for tetanus, RSV, and COVID-19 vaccines. She declined COVID-19 and RSV vaccines.    6. Vaginal irritation.  A refill was sent for Lotrisone cream.    7. Hemorrhoids.  This is stable. She will continue hydrocortisone cream.    8. Retinal vein occlusion.  She is stable. She was provided with contact information for a retina specialist.    Follow-up  The patient will follow up in 6 months.    Problem List Items Addressed This Visit       Thrombocytopenia, unspecified (Chronic)    Aortic atherosclerosis (Chronic)    Calculus of gallbladder without cholecystitis without obstruction (Chronic)    Dry skin (Chronic)    Relevant Orders    Ambulatory referral/consult to Dermatology    Skin lesion (Chronic)    Relevant Orders    Ambulatory referral/consult to Dermatology    Encounter for long-term (current) use of medications (Chronic)    Relevant Orders    CBC Without Differential    Comprehensive Metabolic Panel    TSH    Hemoglobin A1C     Lipid Panel    Hemorrhoids - Primary (Chronic)    Relevant Medications    clotrimazole-betamethasone 1-0.05% (LOTRISONE) cream    hydrocortisone 2.5 % cream    Other Relevant Orders    CBC Without Differential    Comprehensive Metabolic Panel    TSH    Hemoglobin A1C    Lipid Panel    Tributary (branch) retinal vein occlusion, right eye, stable (Chronic)    Encounter for medical examination to establish care    Relevant Orders    CBC Without Differential    Comprehensive Metabolic Panel    TSH    Hemoglobin A1C    Lipid Panel     Other Visit Diagnoses       Encounter for lipid screening for cardiovascular disease        Relevant Orders    CBC Without Differential    Comprehensive Metabolic Panel    TSH    Hemoglobin A1C    Lipid Panel    Need for vaccination        Relevant Medications    diphth,pertus,acell,,tetanus (BOOSTRIX) 2.5-8-5 Lf-mcg-Lf/0.5mL Susp          Future Appointments       Date Specialty Appt Notes    3/11/2024 Lab            Medication Management for assessment above:   Medication List with Changes/Refills   New Medications    DIPHTH,PERTUS,ACELL,,TETANUS (BOOSTRIX) 2.5-8-5 LF-MCG-LF/0.5ML SUSP    Inject 0.5 mLs into the muscle once. for 1 dose   Current Medications    ALPRAZOLAM (XANAX) 0.25 MG TABLET    Take 1 tablet (0.25 mg total) by mouth daily as needed for Anxiety (take 1/2 to 1 tablet as needed).    MULTIVIT WITH MINERALS/LUTEIN (MULTIVITAMIN 50 PLUS ORAL)    Take 1 tablet by mouth.    SOD CHLOR-BICARB-SQUEEZ BOTTLE (NEILMED SINUS RINSE COMPLETE) PKDV    1 Units by sinus irrigation route 2 (two) times a day. Not right before bed    VIT A/VIT C/VIT E/ZINC/COPPER (PRESERVISION AREDS ORAL)    Take 1 tablet by mouth once daily.    VITAMIN D (VITAMIN D3) 1000 UNITS TAB    Take 1,000 Units by mouth once daily.    ZINC GLUCONATE 50 MG TABLET    Take 50 mg by mouth once daily.   Changed and/or Refilled Medications    Modified Medication Previous Medication    CLOTRIMAZOLE-BETAMETHASONE 1-0.05%  (LOTRISONE) CREAM clotrimazole-betamethasone 1-0.05% (LOTRISONE) cream       Apply topically 2 (two) times daily.    Apply topically 2 (two) times daily.    HYDROCORTISONE 2.5 % CREAM hydrocortisone 2.5 % cream       Apply topically 2 (two) times daily.    Apply topically 2 (two) times daily.   Discontinued Medications    AZELASTINE (ASTELIN) 137 MCG (0.1 %) NASAL SPRAY    1 spray (137 mcg total) by Nasal route 2 (two) times daily. for 7 days    BIOTIN 10,000 MCG CAP    Take by mouth.    FAMOTIDINE (PEPCID) 20 MG TABLET    Take 1 tablet (20 mg total) by mouth once daily.    OXYBUTYNIN (DITROPAN-XL) 5 MG TR24    Take 1 tablet (5 mg total) by mouth once daily.       August Lam M.D.  ==========================================================================  Subjective:   Patient ID: Peg Moreno is a 88 y.o. female.  has a past medical history of Depression.   Chief Complaint: Annual Exam and Establish Care      History of Present Illness  The patient presents for an annual exam and to establish care. She is coming from Dr. Tran in [place]. She moved to [facility], so she needs something closer. She is not fasting for labs, has no specialists. Last colonoscopy was she said she thinks 2005, but she was told she does not need anymore. The only concern she has is that she feels so dry, her lips are dry, her mouth is dry, her eyes, her skin, her hairlines are seating, and then she has a spot on her face that she wants to look at. She is accompanied by an adult male.    Her last blood work was in 06/2024. She would like to start doing it every 6 months. She is not fasting today. She uses Lotrisone cream for vaginal irritation. She uses hydrocortisone cream for hemorrhoids.    She has a skin lesion on her face. She thinks it is due to her makeup.    She has excessive dryness and hair loss. Her lips are dry. She uses ChapStick. She drinks an average of 3 16-ounce bottles of water a day. It is worse  at night. Her hairline has receded so badly over the years. She was taking One A Day for hair, skin, and nails for 3 months, but she did not see any difference.   She has received 2 COVID-19 vaccines.She has kidney stones. She had gallstones in 2018. She denies any sharp, shooting pain after eating fried or fatty food. She has retinal vein occlusion. She sees Dr. Paul. She is getting injections in her left eye. She has had this problem for 5 years.    Problem List Items Addressed This Visit       Thrombocytopenia, unspecified (Chronic)    Aortic atherosclerosis (Chronic)    Calculus of gallbladder without cholecystitis without obstruction (Chronic)    Overview     Narrative & Impression  EXAMINATION:  US ABDOMEN COMPLETE     CLINICAL HISTORY:  Personal history of other specified conditions     TECHNIQUE:  Complete abdominal ultrasound (including pancreas, liver, gallbladder, common bile duct, spleen, aorta, IVC, and kidneys) was performed.     COMPARISON:  None     FINDINGS:  The right kidney measures 10.4 cm and the left 11.3 cm in longitudinal dimensions.  The visualized portions of the upper abdominal aorta IVC and pancreas are unremarkable except for aortic atherosclerosis.  Multiple gallstones are seen within the gallbladder measuring as large as 7 mm.  I do not see evidence of gallbladder wall thickening or biliary ductal dilatation with the common bile duct measuring 5 mm.  No definite focal hepatic masses are seen.  The right lobe of the liver measures 14.4 cm.  The spleen is unremarkable and measures 8.1 cm.     IMPRESSION:      Cholelithiasis     Aortic atherosclerosis        Electronically signed by: Alhaji Cha MD  Date:                                            08/15/2018  Time:                                           13:41         Dry skin (Chronic)    Skin lesion (Chronic)    Encounter for long-term (current) use of medications (Chronic)    Hemorrhoids - Primary (Chronic)    Tributary (branch)  retinal vein occlusion, right eye, stable (Chronic)    Encounter for medical examination to establish care     Other Visit Diagnoses       Encounter for lipid screening for cardiovascular disease        Need for vaccination                 Review of patient's allergies indicates:   Allergen Reactions    Celestone [betamethasone sodium phosphate] Itching     Flushing to face    Tessalon [benzonatate] Other (See Comments)    Zoloft [sertraline]      Made her feel strange    Amoxicillin Anxiety     Headaches      Current Outpatient Medications   Medication Instructions    ALPRAZolam (XANAX) 0.25 mg, Oral, Daily PRN    clotrimazole-betamethasone 1-0.05% (LOTRISONE) cream Topical (Top), 2 times daily    diphth,pertus,acell,,tetanus (BOOSTRIX) 2.5-8-5 Lf-mcg-Lf/0.5mL Susp 0.5 mLs, Intramuscular, Once    hydrocortisone 2.5 % cream Topical (Top), 2 times daily    multivit with minerals/lutein (MULTIVITAMIN 50 PLUS ORAL) 1 tablet, Oral    sod chlor-bicarb-squeez bottle (NEILMED SINUS RINSE COMPLETE) pkdv 1 Units, sinus irrigation, 2 times daily, Not right before bed    vit A/vit C/vit E/zinc/copper (PRESERVISION AREDS ORAL) 1 tablet, Oral, Daily    vitamin D (VITAMIN D3) 1,000 Units, Oral, Daily    zinc gluconate 50 mg, Oral, Daily      I have reviewed the PMH, social history, FamilyHx, surgical history, allergies and medications documented / confirmed by the patient at the time of this visit.  Review of Systems   Constitutional:  Negative for chills, fatigue, fever and unexpected weight change.   HENT:  Negative for ear pain and sore throat.    Eyes:  Negative for redness and visual disturbance.   Respiratory:  Negative for cough and shortness of breath.    Cardiovascular:  Negative for chest pain and palpitations.   Gastrointestinal:  Negative for nausea and vomiting.   Genitourinary:  Negative for difficulty urinating and hematuria.   Musculoskeletal:  Negative for arthralgias and myalgias.   Skin:  Negative for rash and  "wound.   Neurological:  Negative for weakness and headaches.   Psychiatric/Behavioral:  Negative for sleep disturbance. The patient is not nervous/anxious.      Objective:   /82   Pulse 73   Ht 5' 6" (1.676 m)   Wt 77.7 kg (171 lb 4.8 oz)   LMP  (LMP Unknown)   SpO2 99%   BMI 27.65 kg/m²   Physical Exam  Vitals and nursing note reviewed.   Constitutional:       General: She is not in acute distress.     Appearance: She is well-developed. She is not ill-appearing, toxic-appearing or diaphoretic.   HENT:      Head: Normocephalic and atraumatic.      Right Ear: Hearing and external ear normal.      Left Ear: Hearing and external ear normal.      Nose: Nose normal. No rhinorrhea.   Eyes:      General: Lids are normal.      Extraocular Movements: Extraocular movements intact.      Conjunctiva/sclera: Conjunctivae normal.      Pupils: Pupils are equal, round, and reactive to light.   Cardiovascular:      Rate and Rhythm: Normal rate.      Pulses: Normal pulses.   Pulmonary:      Effort: Pulmonary effort is normal. No respiratory distress.      Breath sounds: Normal breath sounds.   Abdominal:      General: Bowel sounds are normal.      Palpations: Abdomen is soft.   Musculoskeletal:         General: Normal range of motion.      Cervical back: Normal range of motion and neck supple.   Skin:     General: Skin is warm and dry.      Capillary Refill: Capillary refill takes less than 2 seconds.      Coloration: Skin is not pale.      Findings: Lesion present.   Neurological:      General: No focal deficit present.      Mental Status: She is alert and oriented to person, place, and time. She is not disoriented.   Psychiatric:         Attention and Perception: She is attentive.         Mood and Affect: Mood normal. Mood is not anxious or depressed.         Speech: Speech is not rapid and pressured or slurred.         Behavior: Behavior normal. Behavior is not agitated, aggressive or hyperactive. Behavior is " cooperative.         Thought Content: Thought content normal. Thought content is not paranoid or delusional. Thought content does not include homicidal or suicidal ideation. Thought content does not include homicidal or suicidal plan.         Cognition and Memory: Memory is not impaired.         Judgment: Judgment normal.       Results  Laboratory Studies  Labs were reviewed with the patient.    Assessment:     1. Hemorrhoids, unspecified hemorrhoid type    2. Encounter for medical examination to establish care    3. Encounter for long-term (current) use of medications    4. Encounter for lipid screening for cardiovascular disease    5. Skin lesion    6. Dry skin    7. Need for vaccination    8. Calculus of gallbladder without cholecystitis without obstruction    9. Tributary (branch) retinal vein occlusion, right eye, stable    10. Thrombocytopenia, unspecified    11. Aortic atherosclerosis      MDM:   New patient.  Total time: 42 minutes.  This includes total time spent on the encounter, which includes face to face time and non-face to face time preparing to see the patient (eg, review of previous medical records, tests), Obtaining and/or reviewing separately obtained history, documenting clinical information in the electronic or other health record, independently interpreting results (not separately reported)/communicating results to the patient/family/caregiver, and/or care coordination (not separately reported).    I have Reviewed and summarized old records.  I have performed thorough medication reconciliation today and discussed risk and benefits of medications.  I have reviewed labs and discussed with patient.  All questions were answered.  I am requesting old records and will review them once they are available.    I have signed for the following orders AND/OR meds.  Orders Placed This Encounter   Procedures    CBC Without Differential     Standing Status:   Future     Standing Expiration Date:   5/7/2025     Comprehensive Metabolic Panel     Standing Status:   Future     Standing Expiration Date:   5/7/2025    TSH     Standing Status:   Future     Standing Expiration Date:   5/7/2025    Hemoglobin A1C     Standing Status:   Future     Standing Expiration Date:   5/7/2025    Lipid Panel     Standing Status:   Future     Standing Expiration Date:   5/7/2025    Ambulatory referral/consult to Dermatology     Standing Status:   Future     Standing Expiration Date:   4/8/2025     Referral Priority:   Routine     Referral Type:   Consultation     Referral Reason:   Specialty Services Required     Referred to Provider:   Karin Mcfarland MD     Requested Specialty:   Dermatology     Number of Visits Requested:   1     Medications Ordered This Encounter   Medications    clotrimazole-betamethasone 1-0.05% (LOTRISONE) cream     Sig: Apply topically 2 (two) times daily.     Dispense:  60 g     Refill:  0    diphth,pertus,acell,,tetanus (BOOSTRIX) 2.5-8-5 Lf-mcg-Lf/0.5mL Susp     Sig: Inject 0.5 mLs into the muscle once. for 1 dose     Dispense:  0.5 mL     Refill:  0    hydrocortisone 2.5 % cream     Sig: Apply topically 2 (two) times daily.     Dispense:  28 g     Refill:  5        Follow up in about 6 months (around 9/8/2024), or if symptoms worsen or fail to improve, for Med refills, LAB RESULTS, with Radha Dunn NP.  Future Appointments       Date Specialty Appt Notes    3/11/2024 Lab           If no improvement in symptoms or symptoms worsen, advised to call/follow-up at clinic or go to ER. Patient voiced understanding and all questions/concerns were addressed.   DISCLAIMER: This note was compiled by using a speech recognition dictation system and therefore please be aware that typographical / speech recognition errors can and do occur.  Please contact me if you see any errors specifically.  Consent was obtained for [facility] recording system prior to the visit.    August Lam M.D.       Office: [phone]  [place] [address]  FAX: [phone]

## 2024-03-11 ENCOUNTER — LAB VISIT (OUTPATIENT)
Dept: LAB | Facility: HOSPITAL | Age: 89
End: 2024-03-11
Attending: FAMILY MEDICINE
Payer: MEDICARE

## 2024-03-11 DIAGNOSIS — K64.9 HEMORRHOIDS, UNSPECIFIED HEMORRHOID TYPE: ICD-10-CM

## 2024-03-11 DIAGNOSIS — Z79.899 ENCOUNTER FOR LONG-TERM (CURRENT) USE OF MEDICATIONS: ICD-10-CM

## 2024-03-11 DIAGNOSIS — Z13.220 ENCOUNTER FOR LIPID SCREENING FOR CARDIOVASCULAR DISEASE: ICD-10-CM

## 2024-03-11 DIAGNOSIS — Z00.00 ENCOUNTER FOR MEDICAL EXAMINATION TO ESTABLISH CARE: ICD-10-CM

## 2024-03-11 DIAGNOSIS — Z13.6 ENCOUNTER FOR LIPID SCREENING FOR CARDIOVASCULAR DISEASE: ICD-10-CM

## 2024-03-11 LAB
ALBUMIN SERPL BCP-MCNC: 3.8 G/DL (ref 3.5–5.2)
ALP SERPL-CCNC: 59 U/L (ref 55–135)
ALT SERPL W/O P-5'-P-CCNC: 14 U/L (ref 10–44)
ANION GAP SERPL CALC-SCNC: 11 MMOL/L (ref 8–16)
AST SERPL-CCNC: 20 U/L (ref 10–40)
BILIRUB SERPL-MCNC: 0.4 MG/DL (ref 0.1–1)
BUN SERPL-MCNC: 21 MG/DL (ref 8–23)
CALCIUM SERPL-MCNC: 10 MG/DL (ref 8.7–10.5)
CHLORIDE SERPL-SCNC: 106 MMOL/L (ref 95–110)
CHOLEST SERPL-MCNC: 223 MG/DL (ref 120–199)
CHOLEST/HDLC SERPL: 3.7 {RATIO} (ref 2–5)
CO2 SERPL-SCNC: 27 MMOL/L (ref 23–29)
CREAT SERPL-MCNC: 0.9 MG/DL (ref 0.5–1.4)
ERYTHROCYTE [DISTWIDTH] IN BLOOD BY AUTOMATED COUNT: 15.7 % (ref 11.5–14.5)
EST. GFR  (NO RACE VARIABLE): >60 ML/MIN/1.73 M^2
ESTIMATED AVG GLUCOSE: 126 MG/DL (ref 68–131)
GLUCOSE SERPL-MCNC: 101 MG/DL (ref 70–110)
HBA1C MFR BLD: 6 % (ref 4–5.6)
HCT VFR BLD AUTO: 43.8 % (ref 37–48.5)
HDLC SERPL-MCNC: 61 MG/DL (ref 40–75)
HDLC SERPL: 27.4 % (ref 20–50)
HGB BLD-MCNC: 13.4 G/DL (ref 12–16)
LDLC SERPL CALC-MCNC: 139.6 MG/DL (ref 63–159)
MCH RBC QN AUTO: 25.1 PG (ref 27–31)
MCHC RBC AUTO-ENTMCNC: 30.6 G/DL (ref 32–36)
MCV RBC AUTO: 82 FL (ref 82–98)
NONHDLC SERPL-MCNC: 162 MG/DL
PLATELET # BLD AUTO: 138 K/UL (ref 150–450)
PMV BLD AUTO: 12.7 FL (ref 9.2–12.9)
POTASSIUM SERPL-SCNC: 4.2 MMOL/L (ref 3.5–5.1)
PROT SERPL-MCNC: 6.9 G/DL (ref 6–8.4)
RBC # BLD AUTO: 5.33 M/UL (ref 4–5.4)
SODIUM SERPL-SCNC: 144 MMOL/L (ref 136–145)
TRIGL SERPL-MCNC: 112 MG/DL (ref 30–150)
TSH SERPL DL<=0.005 MIU/L-ACNC: 2.79 UIU/ML (ref 0.4–4)
WBC # BLD AUTO: 5.6 K/UL (ref 3.9–12.7)

## 2024-03-11 PROCEDURE — 36415 COLL VENOUS BLD VENIPUNCTURE: CPT | Mod: PO | Performed by: FAMILY MEDICINE

## 2024-03-11 PROCEDURE — 80061 LIPID PANEL: CPT | Performed by: FAMILY MEDICINE

## 2024-03-11 PROCEDURE — 83036 HEMOGLOBIN GLYCOSYLATED A1C: CPT | Performed by: FAMILY MEDICINE

## 2024-03-11 PROCEDURE — 85027 COMPLETE CBC AUTOMATED: CPT | Performed by: FAMILY MEDICINE

## 2024-03-11 PROCEDURE — 84443 ASSAY THYROID STIM HORMONE: CPT | Performed by: FAMILY MEDICINE

## 2024-03-11 PROCEDURE — 80053 COMPREHEN METABOLIC PANEL: CPT | Performed by: FAMILY MEDICINE

## 2024-03-12 ENCOUNTER — TELEPHONE (OUTPATIENT)
Dept: FAMILY MEDICINE | Facility: CLINIC | Age: 89
End: 2024-03-12
Payer: MEDICARE

## 2024-03-12 NOTE — TELEPHONE ENCOUNTER
----- Message from Paul Lewis sent at 3/12/2024  3:51 PM CDT -----  Contact: Patient  Patient is returning a missed call regarding results. Please call patient at .[phone]   '

## 2024-03-12 NOTE — PROGRESS NOTES
Make follow-up lab appointment per recommendation below.  Check to see if patient has seen the results through my chart.  If not then,  #CALL THE PATIENT# to discuss results/see if they have questions and document verification of contact. Make F/U appt if needed. [phone]    #My interpretation that was sent to them through [employer] [facility]:  Shahid, I have reviewed your recent blood work.     Your complete blood count is stable.    Your metabolic panel which shows your glucose, kidney function, electrolytes, and liver function is normal.   Thyroid study is normal.   Your cholesterol is borderline high.    Your hemoglobin A1c is stable.  This test is gold standard screening test for diabetes.  It is a measures 3 months of your average blood sugar.    =========================  Also please address any outstanding health maintenance that may be due: RSV Vaccine (Age 60+ and Pregnant patients)(1 - 1-dose 60+ series) Never done

## 2024-05-18 ENCOUNTER — PATIENT MESSAGE (OUTPATIENT)
Dept: FAMILY MEDICINE | Facility: CLINIC | Age: 89
End: 2024-05-18
Payer: MEDICARE

## 2024-06-10 PROBLEM — Z00.00 ENCOUNTER FOR MEDICAL EXAMINATION TO ESTABLISH CARE: Status: RESOLVED | Noted: 2024-03-08 | Resolved: 2024-06-10

## 2024-06-26 ENCOUNTER — OFFICE VISIT (OUTPATIENT)
Dept: FAMILY MEDICINE | Facility: CLINIC | Age: 89
End: 2024-06-26
Payer: MEDICARE

## 2024-06-26 VITALS
HEIGHT: 66 IN | DIASTOLIC BLOOD PRESSURE: 76 MMHG | OXYGEN SATURATION: 96 % | WEIGHT: 168 LBS | SYSTOLIC BLOOD PRESSURE: 138 MMHG | BODY MASS INDEX: 27 KG/M2 | HEART RATE: 75 BPM

## 2024-06-26 DIAGNOSIS — F41.9 ANXIETY: Primary | ICD-10-CM

## 2024-06-26 DIAGNOSIS — Z79.899 ENCOUNTER FOR LONG-TERM (CURRENT) USE OF MEDICATIONS: ICD-10-CM

## 2024-06-26 DIAGNOSIS — K21.00 GASTROESOPHAGEAL REFLUX DISEASE WITH ESOPHAGITIS WITHOUT HEMORRHAGE: ICD-10-CM

## 2024-06-26 DIAGNOSIS — J30.2 SEASONAL ALLERGIES: ICD-10-CM

## 2024-06-26 DIAGNOSIS — G62.9 NEUROPATHY: ICD-10-CM

## 2024-06-26 PROCEDURE — 99214 OFFICE O/P EST MOD 30 MIN: CPT | Mod: PBBFAC,PO | Performed by: FAMILY MEDICINE

## 2024-06-26 PROCEDURE — 99214 OFFICE O/P EST MOD 30 MIN: CPT | Mod: S$PBB,,, | Performed by: FAMILY MEDICINE

## 2024-06-26 PROCEDURE — G2211 COMPLEX E/M VISIT ADD ON: HCPCS | Mod: S$PBB,,, | Performed by: FAMILY MEDICINE

## 2024-06-26 PROCEDURE — 99999 PR PBB SHADOW E&M-EST. PATIENT-LVL IV: CPT | Mod: PBBFAC,,, | Performed by: FAMILY MEDICINE

## 2024-06-26 RX ORDER — ALPRAZOLAM 0.25 MG/1
0.25 TABLET ORAL DAILY PRN
Qty: 30 TABLET | Refills: 0 | Status: SHIPPED | OUTPATIENT
Start: 2024-06-26

## 2024-06-26 RX ORDER — FLUTICASONE PROPIONATE 50 MCG
SPRAY, SUSPENSION (ML) NASAL
Qty: 16 G | Refills: 12 | Status: SHIPPED | OUTPATIENT
Start: 2024-06-26

## 2024-06-26 RX ORDER — LORATADINE 10 MG/1
10 TABLET ORAL DAILY
Start: 2024-06-26 | End: 2025-06-26

## 2024-06-26 RX ORDER — OMEPRAZOLE 40 MG/1
40 CAPSULE, DELAYED RELEASE ORAL DAILY
Qty: 90 CAPSULE | Refills: 3 | Status: SHIPPED | OUTPATIENT
Start: 2024-06-26 | End: 2025-06-26

## 2024-06-26 NOTE — ASSESSMENT & PLAN NOTE
Offered Lyrica.  Patient would like to stay off of medication at this time.  Check folate B12 level on next blood work.

## 2024-06-26 NOTE — PATIENT INSTRUCTIONS
Follow up in about 6 months (around 12/26/2024), or if symptoms worsen or fail to improve, for Med refills, LAB RESULTS.     Dear patient,   As a result of recent federal legislation (The Federal Cures Act), you may receive lab or pathology results from your visit in your [facility] account before your physician is able to contact you. Your physician or their representative will relay the results to you with their recommendations at their soonest availability.     If no improvement in symptoms or symptoms worsen, please be advised to call MD, follow-up at clinic and/or go to ER if becomes severe.    August Lam M.D.        We Offer TELEHEALTH & Same Day Appointments!   Book your Telehealth appointment with me through my nurse or   Clinic appointments on [facility]!    [address] [place] [address]    Office: [phone]   FAX: [phone]    Check out my Facebook Page and Follow Me at: [email or web address]    Check out my website at [employer] by clicking on: [email or web address]    To Schedule appointments online, go to [facility]: [email or web address]

## 2024-06-26 NOTE — PROGRESS NOTES
PLAN:    Assessment & Plan  1. Cough and sore throat.  The patient's symptoms suggest a postnasal drip, which is causing throat discomfort. A prescription for Claritin and Flonase nasal spray will be provided.    2. Acid reflux.  The patient is advised to resume her omeprazole regimen.    3. Anxiety.  A refill of Xanax will be provided.    4. Neuropathy.  The use of Lyrica as an alternative to gabapentin was suggested, but the patient declined.    Follow-up  A follow-up appointment is scheduled for the patient in a few months.    Problem List Items Addressed This Visit       Encounter for long-term (current) use of medications (Chronic)     Complete history and physical was completed today.  Complete and thorough medication reconciliation was performed.  Discussed risks and benefits of medications.  Advised patient on orders and health maintenance.  We discussed old records and old labs if available.  Will request any records not available through epic.  Continue current medications listed on your summary sheet.           Anxiety - Primary (Chronic)     Restart Xanax as needed.  Discussed on proper usage.  The patient was checked in the [facility] of [employer]'s  Prescription Monitoring Program. There appears to be no incongruities with the patient's verbalized history.            Relevant Medications    ALPRAZolam (XANAX) 0.25 MG tablet    Seasonal allergies (Chronic)     Start Claritin and Flonase.  Follow-up sooner if no improvement.  Avoid triggers.  Discussed condition course and signs and symptoms to expect.  Patient advised take anti-inflammatories and or Tylenol for pain or fever.  ER precautions.  Call MD or follow-up to clinic if not improving or worsening symptoms.           Relevant Medications    loratadine (CLARITIN) 10 mg tablet    fluticasone propionate (FLONASE) 50 mcg/actuation nasal spray    Gastroesophageal reflux disease with esophagitis without hemorrhage (Chronic)     Restart  omeprazole.  GERD RECOMMENDATIONS  Please be advised of condition course.  - Take PPI in the morning 30-60 minutes before breakfast  - I recommend ongoing Education for lifestyle modifications to help control/reduce reflux/abdominal pain including: avoid large meals, avoid eating within 2-3 hours of bedtime (avoid late night eating & lying down soon after eating), elevate head of bed if nocturnal symptoms are present, smoking cessation (if current smoker), & weight loss (if overweight).   - please be advised to avoid known foods which trigger reflux symptoms & to minimize/avoid high-fat foods, chocolate, caffeine, citrus, alcohol, & tomato products.  - Advised to avoid/limit use of NSAID's, since they can cause GI upset, bleeding, and/or ulcers. If needed, take with food.          Relevant Medications    omeprazole (PRILOSEC) 40 MG capsule    Neuropathy     Offered Lyrica.  Patient would like to stay off of medication at this time.  Check folate B12 level on next blood work.         Relevant Orders    Vitamin B12    Folate        Medication Management for assessment above:   Medication List with Changes/Refills   New Medications    FLUTICASONE PROPIONATE (FLONASE) 50 MCG/ACTUATION NASAL SPRAY    Use 2 puffs in each nostril q day.    LORATADINE (CLARITIN) 10 MG TABLET    Take 1 tablet (10 mg total) by mouth once daily.    OMEPRAZOLE (PRILOSEC) 40 MG CAPSULE    Take 1 capsule (40 mg total) by mouth once daily.   Current Medications    CLOTRIMAZOLE-BETAMETHASONE 1-0.05% (LOTRISONE) CREAM    Apply topically 2 (two) times daily.    HYDROCORTISONE 2.5 % CREAM    Apply topically 2 (two) times daily.    MULTIVIT WITH MINERALS/LUTEIN (MULTIVITAMIN 50 PLUS ORAL)    Take 1 tablet by mouth.    SOD CHLOR-BICARB-SQUEEZ BOTTLE (NEILMED SINUS RINSE COMPLETE) PKDV    1 Units by sinus irrigation route 2 (two) times a day. Not right before bed    VIT A/VIT C/VIT E/ZINC/COPPER (PRESERVISION AREDS ORAL)    Take 1 tablet by mouth once  daily.    VITAMIN D (VITAMIN D3) 1000 UNITS TAB    Take 1,000 Units by mouth once daily.    ZINC GLUCONATE 50 MG TABLET    Take 50 mg by mouth once daily.   Changed and/or Refilled Medications    Modified Medication Previous Medication    ALPRAZOLAM (XANAX) 0.25 MG TABLET ALPRAZolam (XANAX) 0.25 MG tablet       Take 1 tablet (0.25 mg total) by mouth daily as needed for Anxiety (take 1/2 to 1 tablet as needed).    Take 1 tablet (0.25 mg total) by mouth daily as needed for Anxiety (take 1/2 to 1 tablet as needed).       August Lam M.D.  ==========================================================================  Subjective:   Patient ID: Peg Moreno is a 88 y.o. female.  has a past medical history of Depression.   Chief Complaint: Anxiety      History of Present Illness  The patient is an 88-year-old female here with headaches and anxiety.    The patient reports a general feeling of well-being today. Approximately a year ago, she consulted Dr. Hernandez, an ENT specialist, who diagnosed her with gastroesophageal reflux disease (GERD) and prescribed sinus wash and omeprazole. However, she discontinued the use of omeprazole. Recently, she has developed a cough, which she describes as a snorting sound, and a sensation of something in her throat, which she can expectorate. Dr. Hernandez did not prescribe any medication and instead recommended sinus rinse and omeprazole. The patient has a history of chronic sinus issues and sinus pains. She also reports a sore throat but denies any sinus pressure.    The patient reports severe anxiety, which she attributes to current life events. She describes experiencing breathlessness when walking long distances. She was prescribed Xanax, but she has not refilled it in a year. If she has some left, she takes half a tablet. This morning, she took half a tablet, which helped her. She was informed by another physician several years ago that a low dose of Xanax was not  prescribed due to her age and the potential risk of stroke or myocardial infarction.    The patient reports experiencing a burning sensation in her feet. She has attempted to use creams, but these have not provided relief. She discontinued gabapentin following her 's death.    Problem List Items Addressed This Visit       Encounter for long-term (current) use of medications (Chronic)    Overview     June 2024:CHRONIC. Stable. Compliant with medications for managed conditions. See medication list. No SE reported.   Routine lab analysis is being monitored. Refills were addressed.  Lab Results   Component Value Date    WBC 5.60 03/11/2024    HGB 13.4 03/11/2024    HCT 43.8 03/11/2024    MCV 82 03/11/2024     (L) 03/11/2024         Chemistry        Component Value Date/Time     03/11/2024 0740    K 4.2 03/11/2024 0740     03/11/2024 0740    CO2 27 03/11/2024 0740    BUN 21 03/11/2024 0740    CREATININE 0.9 03/11/2024 0740     03/11/2024 0740        Component Value Date/Time    CALCIUM 10.0 03/11/2024 0740    ALKPHOS 59 03/11/2024 0740    AST 20 03/11/2024 0740    ALT 14 03/11/2024 0740    BILITOT 0.4 03/11/2024 0740    ESTGFRAFRICA >60.0 10/25/2021 1018    EGFRNONAA >60.0 10/25/2021 1018          Lab Results   Component Value Date    TSH 2.795 03/11/2024              Current Assessment & Plan     Complete history and physical was completed today.  Complete and thorough medication reconciliation was performed.  Discussed risks and benefits of medications.  Advised patient on orders and health maintenance.  We discussed old records and old labs if available.  Will request any records not available through epic.  Continue current medications listed on your summary sheet.           Anxiety - Primary (Chronic)    Overview     June 2024:  Chronic.  Uncontrolled.  Patient is noncompliant with her Xanax.  She is out of the medication.  She does not take it often.         Current Assessment &  Plan     Restart Xanax as needed.  Discussed on proper usage.  The patient was checked in the [facility] Board of [employer]acy's  Prescription Monitoring Program. There appears to be no incongruities with the patient's verbalized history.            Seasonal allergies (Chronic)    Overview     Chronic.  Intermittent control.  Patient has seen allergist in the past.  She has not currently taking any medication for this.  Having nasal congestion rhinorrhea sore throat etcetera.  She was put on omeprazole for reflux.         Current Assessment & Plan     Start Claritin and Flonase.  Follow-up sooner if no improvement.  Avoid triggers.  Discussed condition course and signs and symptoms to expect.  Patient advised take anti-inflammatories and or Tylenol for pain or fever.  ER precautions.  Call MD or follow-up to clinic if not improving or worsening symptoms.           Gastroesophageal reflux disease with esophagitis without hemorrhage (Chronic)    Overview     June 2024:  Chronic.  Intermittent control.  Patient on omeprazole previously she reports that she got off of the medication due to concern for taking long-term use.         Current Assessment & Plan     Restart omeprazole.  GERD RECOMMENDATIONS  Please be advised of condition course.  - Take PPI in the morning 30-60 minutes before breakfast  - I recommend ongoing Education for lifestyle modifications to help control/reduce reflux/abdominal pain including: avoid large meals, avoid eating within 2-3 hours of bedtime (avoid late night eating & lying down soon after eating), elevate head of bed if nocturnal symptoms are present, smoking cessation (if current smoker), & weight loss (if overweight).   - please be advised to avoid known foods which trigger reflux symptoms & to minimize/avoid high-fat foods, chocolate, caffeine, citrus, alcohol, & tomato products.  - Advised to avoid/limit use of NSAID's, since they can cause GI upset, bleeding, and/or ulcers. If  needed, take with food.          Neuropathy    Overview     Chronic.  Intermittent control.  Patient states that she has tried gabapentin in the past but it had side effects.         Current Assessment & Plan     Offered Lyrica.  Patient would like to stay off of medication at this time.  Check folate B12 level on next blood work.             Review of patient's allergies indicates:   Allergen Reactions    Celestone [betamethasone sodium phosphate] Itching     Flushing to face    Tessalon [benzonatate] Other (See Comments)    Gabapentin     Zoloft [sertraline]      Made her feel strange    Amoxicillin Anxiety     Headaches      Current Outpatient Medications   Medication Instructions    ALPRAZolam (XANAX) 0.25 mg, Oral, Daily PRN    clotrimazole-betamethasone 1-0.05% (LOTRISONE) cream Topical (Top), 2 times daily    fluticasone propionate (FLONASE) 50 mcg/actuation nasal spray Use 2 puffs in each nostril q day.    hydrocortisone 2.5 % cream Topical (Top), 2 times daily    loratadine (CLARITIN) 10 mg, Oral, Daily    multivit with minerals/lutein (MULTIVITAMIN 50 PLUS ORAL) 1 tablet, Oral    omeprazole (PRILOSEC) 40 mg, Oral, Daily    sod chlor-bicarb-squeez bottle (NEILMED SINUS RINSE COMPLETE) pkdv 1 Units, sinus irrigation, 2 times daily, Not right before bed    vit A/vit C/vit E/zinc/copper (PRESERVISION AREDS ORAL) 1 tablet, Oral, Daily    vitamin D (VITAMIN D3) 1,000 Units, Oral, Daily    zinc gluconate 50 mg, Oral, Daily      I have reviewed the PMH, social history, FamilyHx, surgical history, allergies and medications documented / confirmed by the patient at the time of this visit.  Review of Systems   Constitutional:  Negative for chills, fatigue, fever and unexpected weight change.   HENT:  Negative for ear pain and sore throat.    Eyes:  Negative for redness and visual disturbance.   Respiratory:  Negative for cough and shortness of breath.    Cardiovascular:  Negative for chest pain and palpitations.  "  Gastrointestinal:  Negative for nausea and vomiting.   Genitourinary:  Negative for difficulty urinating and hematuria.   Musculoskeletal:  Negative for arthralgias and myalgias.   Skin:  Negative for rash and wound.   Neurological:  Negative for weakness and headaches.   Psychiatric/Behavioral:  Negative for sleep disturbance. The patient is not nervous/anxious.      Objective:   /76   Pulse 75   Ht 5' 6" (1.676 m)   Wt 76.2 kg (168 lb)   LMP  (LMP Unknown)   SpO2 96%   BMI 27.12 kg/m²   Physical Exam  Vitals and nursing note reviewed.   Constitutional:       General: She is not in acute distress.     Appearance: She is well-developed. She is not ill-appearing, toxic-appearing or diaphoretic.   HENT:      Head: Normocephalic and atraumatic.      Right Ear: Hearing, tympanic membrane, ear canal and external ear normal. There is no impacted cerumen.      Left Ear: Hearing, tympanic membrane, ear canal and external ear normal. There is no impacted cerumen.      Nose: Congestion and rhinorrhea present.      Mouth/Throat:      Pharynx: Posterior oropharyngeal erythema present.   Eyes:      General: Lids are normal.      Extraocular Movements: Extraocular movements intact.      Conjunctiva/sclera: Conjunctivae normal.      Pupils: Pupils are equal, round, and reactive to light.   Neck:      Vascular: No carotid bruit.   Cardiovascular:      Rate and Rhythm: Normal rate.      Pulses: Normal pulses.   Pulmonary:      Effort: Pulmonary effort is normal. No respiratory distress.      Breath sounds: Normal breath sounds.   Abdominal:      General: Bowel sounds are normal.      Palpations: Abdomen is soft.   Musculoskeletal:         General: Normal range of motion.      Cervical back: Normal range of motion and neck supple.   Lymphadenopathy:      Cervical: No cervical adenopathy.   Skin:     General: Skin is warm and dry.      Capillary Refill: Capillary refill takes less than 2 seconds.      Coloration: Skin is " not pale.   Neurological:      General: No focal deficit present.      Mental Status: She is alert and oriented to person, place, and time. She is not disoriented.      Cranial Nerves: No cranial nerve deficit.      Motor: No weakness.      Gait: Gait normal.   Psychiatric:         Attention and Perception: She is attentive.         Mood and Affect: Mood normal. Mood is not anxious or depressed.         Speech: Speech is not rapid and pressured or slurred.         Behavior: Behavior normal. Behavior is not agitated, aggressive or hyperactive. Behavior is cooperative.         Thought Content: Thought content normal. Thought content is not paranoid or delusional. Thought content does not include homicidal or suicidal ideation. Thought content does not include homicidal or suicidal plan.         Cognition and Memory: Memory is not impaired.         Judgment: Judgment normal.       Physical Exam  Clear lungs.  Normal heart sounds.    Results      Assessment:     1. Anxiety    2. Seasonal allergies    3. Encounter for long-term (current) use of medications    4. Gastroesophageal reflux disease with esophagitis without hemorrhage    5. Neuropathy      MDM:   Moderate medical complexity.  Moderate risk.  Total time: 21 minutes.  This includes total time spent on the encounter, which includes face to face time and non-face to face time preparing to see the patient (eg, review of previous medical records, tests), Obtaining and/or reviewing separately obtained history, documenting clinical information in the electronic or other health record, independently interpreting results (not separately reported)/communicating results to the patient/family/caregiver, and/or care coordination (not separately reported).    I have Reviewed and summarized old records.  I have performed thorough medication reconciliation today and discussed risk and benefits of medications.  I have reviewed labs and discussed with patient.  All questions  were answered.  I am requesting old records and will review them once they are available.    I have signed for the following orders AND/OR meds.  Orders Placed This Encounter   Procedures    Vitamin B12     Standing Status:   Future     Standing Expiration Date:   8/25/2025    Folate     Standing Status:   Future     Standing Expiration Date:   8/25/2025     Medications Ordered This Encounter   Medications    ALPRAZolam (XANAX) 0.25 MG tablet     Sig: Take 1 tablet (0.25 mg total) by mouth daily as needed for Anxiety (take 1/2 to 1 tablet as needed).     Dispense:  30 tablet     Refill:  0    fluticasone propionate (FLONASE) 50 mcg/actuation nasal spray     Sig: Use 2 puffs in each nostril q day.     Dispense:  16 g     Refill:  12    loratadine (CLARITIN) 10 mg tablet     Sig: Take 1 tablet (10 mg total) by mouth once daily.    omeprazole (PRILOSEC) 40 MG capsule     Sig: Take 1 capsule (40 mg total) by mouth once daily.     Dispense:  90 capsule     Refill:  3        Follow up in about 6 months (around 12/26/2024), or if symptoms worsen or fail to improve, for Med refills, LAB RESULTS.    If no improvement in symptoms or symptoms worsen, advised to call/follow-up at clinic or go to ER. Patient voiced understanding and all questions/concerns were addressed.   DISCLAIMER: This note was compiled by using a speech recognition dictation system and therefore please be aware that typographical / speech recognition errors can and do occur.  Please contact me if you see any errors specifically.  Consent was obtained for [employer] recording system prior to the visit.    August Lam M.D.       Office: [phone]   [address] [place] [address]  FAX: [phone]

## 2024-06-26 NOTE — ASSESSMENT & PLAN NOTE
Start Claritin and Flonase.  Follow-up sooner if no improvement.  Avoid triggers.  Discussed condition course and signs and symptoms to expect.  Patient advised take anti-inflammatories and or Tylenol for pain or fever.  ER precautions.  Call MD or follow-up to clinic if not improving or worsening symptoms.

## 2024-06-26 NOTE — ASSESSMENT & PLAN NOTE
Restart Xanax as needed.  Discussed on proper usage.  The patient was checked in the [facility] Board of Pharmacy's  Prescription Monitoring Program. There appears to be no incongruities with the patient's verbalized history.

## 2024-06-26 NOTE — ASSESSMENT & PLAN NOTE
Restart omeprazole.  GERD RECOMMENDATIONS  Please be advised of condition course.  - Take PPI in the morning 30-60 minutes before breakfast  - I recommend ongoing Education for lifestyle modifications to help control/reduce reflux/abdominal pain including: avoid large meals, avoid eating within 2-3 hours of bedtime (avoid late night eating & lying down soon after eating), elevate head of bed if nocturnal symptoms are present, smoking cessation (if current smoker), & weight loss (if overweight).   - please be advised to avoid known foods which trigger reflux symptoms & to minimize/avoid high-fat foods, chocolate, caffeine, citrus, alcohol, & tomato products.  - Advised to avoid/limit use of NSAID's, since they can cause GI upset, bleeding, and/or ulcers. If needed, take with food.

## 2024-07-16 ENCOUNTER — PATIENT OUTREACH (OUTPATIENT)
Dept: ADMINISTRATIVE | Facility: HOSPITAL | Age: 89
End: 2024-07-16
Payer: MEDICARE

## 2024-07-16 NOTE — PROGRESS NOTES
Population Health Chart Review & Patient Outreach Details      Additional Pop Health Notes:               Updates Requested / Reviewed:      Updated Care Coordination Note and Care Team Updated         Health Maintenance Topics Overdue:      VBHM Score: 0     Patient is not due for any topics at this time.    RSV Vaccine                  Health Maintenance Topic(s) Outreach Outcomes & Actions Taken:    Eye Exam - Outreach Outcomes & Actions Taken  : Non-Diabetic Eye External Records Uploaded, Care Team & History Updated if Applicable

## 2024-09-11 ENCOUNTER — PATIENT MESSAGE (OUTPATIENT)
Dept: FAMILY MEDICINE | Facility: CLINIC | Age: 89
End: 2024-09-11
Payer: MEDICARE

## 2024-09-19 ENCOUNTER — OFFICE VISIT (OUTPATIENT)
Dept: FAMILY MEDICINE | Facility: CLINIC | Age: 89
End: 2024-09-19
Payer: MEDICARE

## 2024-09-19 VITALS
SYSTOLIC BLOOD PRESSURE: 118 MMHG | OXYGEN SATURATION: 96 % | HEIGHT: 66 IN | RESPIRATION RATE: 16 BRPM | TEMPERATURE: 98 F | BODY MASS INDEX: 26.23 KG/M2 | DIASTOLIC BLOOD PRESSURE: 62 MMHG | WEIGHT: 163.19 LBS | HEART RATE: 85 BPM

## 2024-09-19 DIAGNOSIS — J01.90 ACUTE BACTERIAL SINUSITIS: Primary | ICD-10-CM

## 2024-09-19 DIAGNOSIS — B96.89 ACUTE BACTERIAL SINUSITIS: Primary | ICD-10-CM

## 2024-09-19 DIAGNOSIS — R05.1 ACUTE COUGH: ICD-10-CM

## 2024-09-19 PROCEDURE — 99213 OFFICE O/P EST LOW 20 MIN: CPT | Mod: S$PBB,,, | Performed by: NURSE PRACTITIONER

## 2024-09-19 PROCEDURE — 87502 INFLUENZA DNA AMP PROBE: CPT | Mod: PBBFAC,PO | Performed by: NURSE PRACTITIONER

## 2024-09-19 PROCEDURE — 87635 SARS-COV-2 COVID-19 AMP PRB: CPT | Mod: PBBFAC,PO | Performed by: NURSE PRACTITIONER

## 2024-09-19 PROCEDURE — 99214 OFFICE O/P EST MOD 30 MIN: CPT | Mod: PBBFAC,PO | Performed by: NURSE PRACTITIONER

## 2024-09-19 PROCEDURE — 99999PBSHW POCT INFLUENZA A/B MOLECULAR: Mod: PBBFAC,,,

## 2024-09-19 PROCEDURE — 99999 PR PBB SHADOW E&M-EST. PATIENT-LVL IV: CPT | Mod: PBBFAC,,, | Performed by: NURSE PRACTITIONER

## 2024-09-19 PROCEDURE — 99999PBSHW: Mod: PBBFAC,,,

## 2024-09-19 RX ORDER — AZITHROMYCIN 250 MG/1
TABLET, FILM COATED ORAL
Qty: 6 TABLET | Refills: 0 | Status: SHIPPED | OUTPATIENT
Start: 2024-09-19 | End: 2024-09-24

## 2024-09-19 NOTE — PROGRESS NOTES
Assessment/Plan:  Problem List Items Addressed This Visit    None  Visit Diagnoses       Acute bacterial sinusitis    -  Primary    Relevant Medications    azithromycin (Z-PEPPER) 250 MG tablet    Acute cough        Relevant Orders    POCT COVID-19 Rapid Screening (Completed)    POCT Influenza A/B Molecular (Completed)        Covid and flu negative  Patient is allergic to steroids  Start Z-pepper as prescribed  Recommend OTC mucinex   Offered cough medicine, states robitussin is working fine for her   Supportive care- rest, increase hydration with water, OTC Tylenol/Ibuprofen for pain/fever.  Follow up if symptoms worsen or fail to improve.  ER precautions for severe or worsening symptoms.     Radha Dunn NP  _____________________________________________________________________________________________________________________________________________________    History of Present Illness    CHIEF COMPLAINT:  Ms. Moreno presents today accompanied by her granddaughter, Yolanda, with sinus symptoms.    HISTORY OF PRESENT ILLNESS:  She reports onset of symptoms x5 days ago, including cough, chest congestion, coughing, sneezing, and runny nose. She denies fever, with recent temperature readings around 97.9°F, but reports chills and sweating. She notes improvement in throat soreness and ability to expectorate after starting OTC medications. She denies ear pain, chest pain, wheezing, and shortness of breath. She has been self-treating with Tylenol, Robitussin, and a mucus relief medication. She denies recent use of steroids or antibiotics. She is currently taking OTC Tylenol, mucus relief medicine, and Robitussin for current symptoms. She reports an adverse reaction to steroids in the past, specifically betamethasone, stating she does not tolerate steroids well. Her son, who lives with her, was recently ill. She suspects he may have had COVID but never was tested.       Past Medical History:  Past Medical History:   Diagnosis  Date    Depression      Past Surgical History:   Procedure Laterality Date    APPENDECTOMY      HERNIA REPAIR      SINUS SURGERY      TONSILLECTOMY       Review of patient's allergies indicates:   Allergen Reactions    Celestone [betamethasone sodium phosphate] Itching     Flushing to face    Tessalon [benzonatate] Other (See Comments)    Gabapentin     Zoloft [sertraline]      Made her feel strange    Amoxicillin Anxiety     Headaches      Social History     Tobacco Use    Smoking status: Former     Current packs/day: 0.00     Types: Cigarettes     Quit date: 11/15/1985     Years since quittin.8    Smokeless tobacco: Never   Substance Use Topics    Alcohol use: No     Comment: rare     Family History   Problem Relation Name Age of Onset    Melanoma Neg Hx      Psoriasis Neg Hx      Lupus Neg Hx      Eczema Neg Hx       Current Outpatient Medications on File Prior to Visit   Medication Sig Dispense Refill    ALPRAZolam (XANAX) 0.25 MG tablet Take 1 tablet (0.25 mg total) by mouth daily as needed for Anxiety (take 1/2 to 1 tablet as needed). 30 tablet 0    clotrimazole-betamethasone 1-0.05% (LOTRISONE) cream Apply topically 2 (two) times daily. 60 g 0    fluticasone propionate (FLONASE) 50 mcg/actuation nasal spray Use 2 puffs in each nostril q day. 16 g 12    hydrocortisone 2.5 % cream Apply topically 2 (two) times daily. 28 g 5    loratadine (CLARITIN) 10 mg tablet Take 1 tablet (10 mg total) by mouth once daily.      multivit with minerals/lutein (MULTIVITAMIN 50 PLUS ORAL) Take 1 tablet by mouth.      omeprazole (PRILOSEC) 40 MG capsule Take 1 capsule (40 mg total) by mouth once daily. 90 capsule 3    sod chlor-bicarb-squeez bottle (NEILMED SINUS RINSE COMPLETE) pkdv 1 Units by sinus irrigation route 2 (two) times a day. Not right before bed 1 each 11    vit A/vit C/vit E/zinc/copper (PRESERVISION AREDS ORAL) Take 1 tablet by mouth once daily.      vitamin D (VITAMIN D3) 1000 units Tab Take 1,000 Units by  "mouth once daily.      zinc gluconate 50 mg tablet Take 50 mg by mouth once daily.       No current facility-administered medications on file prior to visit.     Review of Systems   Constitutional:  Positive for chills and diaphoresis. Negative for appetite change, fatigue and fever.   HENT:  Positive for congestion, postnasal drip, sneezing and sore throat. Negative for ear pain and rhinorrhea.    Eyes:  Negative for visual disturbance.   Respiratory:  Positive for cough. Negative for shortness of breath.    Cardiovascular:  Negative for chest pain, palpitations and leg swelling.   Gastrointestinal:  Negative for abdominal pain, diarrhea and vomiting.   Genitourinary:  Negative for difficulty urinating, dysuria and hematuria.   Musculoskeletal:  Negative for arthralgias and myalgias.   Skin:  Negative for rash and wound.   Neurological:  Negative for dizziness and headaches.   Psychiatric/Behavioral:  Negative for behavioral problems. The patient is not nervous/anxious.      Vitals:    09/19/24 1055   BP: 118/62   Pulse: 85   Resp: 16   Temp: 97.9 °F (36.6 °C)   TempSrc: Oral   SpO2: 96%   Weight: 74 kg (163 lb 3.2 oz)   Height: 5' 6" (1.676 m)     Wt Readings from Last 3 Encounters:   09/19/24 74 kg (163 lb 3.2 oz)   08/19/24 76.2 kg (167 lb 15.9 oz)   06/26/24 76.2 kg (168 lb)     Physical Exam  Vitals reviewed.   Constitutional:       General: She is not in acute distress.     Appearance: Normal appearance. She is not ill-appearing.      Comments: Granddaughter, Yolanda, present   HENT:      Head: Normocephalic and atraumatic.      Right Ear: Tympanic membrane, ear canal and external ear normal.      Left Ear: Tympanic membrane, ear canal and external ear normal.      Nose: Congestion present.      Mouth/Throat:      Mouth: Mucous membranes are moist.      Pharynx: Posterior oropharyngeal erythema (mild) present.   Eyes:      Extraocular Movements: Extraocular movements intact.      Conjunctiva/sclera: Conjunctivae " normal.   Cardiovascular:      Rate and Rhythm: Normal rate.      Heart sounds: Normal heart sounds.   Pulmonary:      Effort: Pulmonary effort is normal. No respiratory distress.      Breath sounds: Normal breath sounds. No wheezing.   Abdominal:      General: Abdomen is flat. There is no distension.   Musculoskeletal:         General: Normal range of motion.      Cervical back: Normal range of motion and neck supple.   Lymphadenopathy:      Cervical: No cervical adenopathy.   Skin:     General: Skin is warm and dry.      Capillary Refill: Capillary refill takes less than 2 seconds.      Coloration: Skin is not pale.      Findings: No rash.   Neurological:      General: No focal deficit present.      Mental Status: She is alert and oriented to person, place, and time. Mental status is at baseline.   Psychiatric:         Mood and Affect: Mood normal.         Speech: Speech normal. Speech is not rapid and pressured, delayed or slurred.         Behavior: Behavior normal. Behavior is not agitated, slowed, aggressive, withdrawn, hyperactive or combative. Behavior is cooperative.         Thought Content: Thought content normal.         Judgment: Judgment normal.       Health Maintenance   Topic Date Due    TETANUS VACCINE  03/08/2025 (Originally 10/15/1953)    Lipid Panel  03/11/2029    Shingles Vaccine  Discontinued     DISCLAIMER: This note was compiled by using a speech recognition dictation system and therefore please be aware that typographical / speech recognition errors can and do occur.  Please contact me if you see any errors specifically.  Consent was obtained for [email or web address] recording system prior to the visit.

## 2024-09-30 ENCOUNTER — OFFICE VISIT (OUTPATIENT)
Dept: FAMILY MEDICINE | Facility: CLINIC | Age: 89
End: 2024-09-30
Payer: MEDICARE

## 2024-09-30 VITALS
SYSTOLIC BLOOD PRESSURE: 138 MMHG | OXYGEN SATURATION: 97 % | HEART RATE: 100 BPM | BODY MASS INDEX: 26.42 KG/M2 | DIASTOLIC BLOOD PRESSURE: 72 MMHG | WEIGHT: 164.38 LBS | HEIGHT: 66 IN

## 2024-09-30 DIAGNOSIS — J30.2 SEASONAL ALLERGIES: ICD-10-CM

## 2024-09-30 DIAGNOSIS — A49.9 BACTERIAL INFECTION: ICD-10-CM

## 2024-09-30 DIAGNOSIS — J32.9 RECURRENT SINUSITIS: Primary | ICD-10-CM

## 2024-09-30 PROCEDURE — 99214 OFFICE O/P EST MOD 30 MIN: CPT | Mod: S$PBB,,, | Performed by: NURSE PRACTITIONER

## 2024-09-30 PROCEDURE — 99999 PR PBB SHADOW E&M-EST. PATIENT-LVL IV: CPT | Mod: PBBFAC,,, | Performed by: NURSE PRACTITIONER

## 2024-09-30 PROCEDURE — 99214 OFFICE O/P EST MOD 30 MIN: CPT | Mod: PBBFAC,PO | Performed by: NURSE PRACTITIONER

## 2024-09-30 RX ORDER — LORATADINE 10 MG/1
10 TABLET ORAL DAILY
Qty: 90 TABLET | Refills: 4
Start: 2024-09-30 | End: 2025-12-24

## 2024-09-30 RX ORDER — PROMETHAZINE HYDROCHLORIDE AND DEXTROMETHORPHAN HYDROBROMIDE 6.25; 15 MG/5ML; MG/5ML
5 SYRUP ORAL EVERY 6 HOURS PRN
Qty: 118 ML | Refills: 0 | Status: CANCELLED | OUTPATIENT
Start: 2024-09-30 | End: 2024-10-10

## 2024-09-30 RX ORDER — DOXYCYCLINE 100 MG/1
100 CAPSULE ORAL EVERY 12 HOURS
Qty: 14 CAPSULE | Refills: 0 | Status: SHIPPED | OUTPATIENT
Start: 2024-09-30

## 2024-09-30 RX ORDER — ALBUTEROL SULFATE 90 UG/1
2 INHALANT RESPIRATORY (INHALATION) EVERY 6 HOURS PRN
Qty: 18 G | Refills: 0 | Status: CANCELLED | OUTPATIENT
Start: 2024-09-30

## 2024-09-30 NOTE — PATIENT INSTRUCTIONS
Rafael Ruvalcaba,     If you are due for any health screening(s) below please notify me so we can arrange them to be ordered and scheduled. Most healthy patients at your age complete them, but you are free to accept or refuse.     If you can't do it, I'll definitely understand. If you can, I'd certainly appreciate it!    All of your core healthy metrics are met.

## 2024-09-30 NOTE — PROGRESS NOTES
Assessment/Plan:  Problem List Items Addressed This Visit    None  Visit Diagnoses       Recurrent sinusitis    -  Primary    Relevant Medications    loratadine (CLARITIN) 10 mg tablet    Bacterial infection        Relevant Medications    doxycycline (VIBRAMYCIN) 100 MG Cap        Start doxycycline  Restart Claritin and Flonase daily for maintenance   Supportive care- rest, increase hydration with water, OTC Tylenol/Ibuprofen for pain/fever.  If worsening or no improvement, follow up with ENT   Follow up if symptoms worsen or fail to improve.  ER precautions for severe or worsening symptoms.     Radha Dunn NP  _____________________________________________________________________________________________________________________________________________________    History of Present Illness    CHIEF COMPLAINT:  Ms. Moreno presents today for follow up of persistent sinus problems.    SINUS SYMPTOMS:  She reports persistent cough with mucus production and sinus drainage. She was seen 11 days ago on 9/19/2024. She was treated with a Z-yolanda. Reports that the cough and sinus symptoms initially improved with previous treatment but returned. She denies shortness of breath, wheezing. Symptoms are primarily localized to the throat and sinus areas, with occasional sore throat. She has been using OTC Tylenol and Mucinex for symptom management with moderate effectiveness. She reports a bad taste in her mouth from postnasal drainage. She has a history of laryngeal reflux, hoarseness, and acid reflux diagnosed by an ENT specialist in 2022. She denies experiencing heartburn after eating. Coffee consumption exacerbates her symptoms, particularly in the morning. She has reduced her coffee intake and noticed some improvement. She occasionally experiences throat discomfort. She denies fever, chills. She has a history of chronic sinusitis and previous sinus surgery. She is currently taking omeprazole daily in the morning. She reports a  possible allergy to steroids, noting a previous adverse reaction to a Celestone shot that resulted in flushing of the face. She previously used nasal rinses for sinus problems but discontinued due to lack of effectiveness. She was initially getting discharge when using the rinse but eventually stopped as she was not experiencing any benefit from continued use. She was also using Flonase nasal spray and Claritin in the past but has not been taking this.     Past Medical History:  Past Medical History:   Diagnosis Date    Depression      Past Surgical History:   Procedure Laterality Date    APPENDECTOMY      HERNIA REPAIR      SINUS SURGERY      TONSILLECTOMY       Review of patient's allergies indicates:   Allergen Reactions    Celestone [betamethasone sodium phosphate] Itching     Flushing to face    Tessalon [benzonatate] Other (See Comments)    Gabapentin     Zoloft [sertraline]      Made her feel strange    Amoxicillin Anxiety     Headaches      Social History     Tobacco Use    Smoking status: Former     Current packs/day: 0.00     Types: Cigarettes     Quit date: 11/15/1985     Years since quittin.9    Smokeless tobacco: Never   Substance Use Topics    Alcohol use: No     Comment: rare     Family History   Problem Relation Name Age of Onset    Melanoma Neg Hx      Psoriasis Neg Hx      Lupus Neg Hx      Eczema Neg Hx       Current Outpatient Medications on File Prior to Visit   Medication Sig Dispense Refill    ALPRAZolam (XANAX) 0.25 MG tablet Take 1 tablet (0.25 mg total) by mouth daily as needed for Anxiety (take 1/2 to 1 tablet as needed). 30 tablet 0    clotrimazole-betamethasone 1-0.05% (LOTRISONE) cream Apply topically 2 (two) times daily. 60 g 0    fluticasone propionate (FLONASE) 50 mcg/actuation nasal spray Use 2 puffs in each nostril q day. 16 g 12    hydrocortisone 2.5 % cream Apply topically 2 (two) times daily. 28 g 5    multivit with minerals/lutein (MULTIVITAMIN 50 PLUS ORAL) Take 1 tablet  "by mouth.      omeprazole (PRILOSEC) 40 MG capsule Take 1 capsule (40 mg total) by mouth once daily. 90 capsule 3    sod chlor-bicarb-squeez bottle (NEILMED SINUS RINSE COMPLETE) pkdv 1 Units by sinus irrigation route 2 (two) times a day. Not right before bed 1 each 11    vit A/vit C/vit E/zinc/copper (PRESERVISION AREDS ORAL) Take 1 tablet by mouth once daily.      vitamin D (VITAMIN D3) 1000 units Tab Take 1,000 Units by mouth once daily.      zinc gluconate 50 mg tablet Take 50 mg by mouth once daily.      [DISCONTINUED] loratadine (CLARITIN) 10 mg tablet Take 1 tablet (10 mg total) by mouth once daily.       No current facility-administered medications on file prior to visit.     Review of Systems   Constitutional:  Negative for appetite change, chills, fatigue and fever.   HENT:  Positive for congestion, postnasal drip and sinus pressure. Negative for ear pain, rhinorrhea and sore throat.    Eyes:  Negative for visual disturbance.   Respiratory:  Positive for cough. Negative for shortness of breath.    Cardiovascular:  Negative for chest pain, palpitations and leg swelling.   Gastrointestinal:  Negative for abdominal pain, diarrhea and vomiting.   Genitourinary:  Negative for difficulty urinating, dysuria and hematuria.   Musculoskeletal:  Negative for arthralgias and myalgias.   Skin:  Negative for rash and wound.   Neurological:  Negative for dizziness and headaches.   Psychiatric/Behavioral:  Negative for behavioral problems. The patient is not nervous/anxious.      Vitals:    09/30/24 0959   BP: 138/72   Pulse: 100   SpO2: 97%   Weight: 74.6 kg (164 lb 6.4 oz)   Height: 5' 6" (1.676 m)     Wt Readings from Last 3 Encounters:   09/30/24 74.6 kg (164 lb 6.4 oz)   09/19/24 74 kg (163 lb 3.2 oz)   08/19/24 76.2 kg (167 lb 15.9 oz)     Physical Exam  Vitals reviewed.   Constitutional:       General: She is not in acute distress.     Appearance: Normal appearance. She is not ill-appearing.      Comments: Here " alone today   HENT:      Head: Normocephalic and atraumatic.      Right Ear: Tympanic membrane, ear canal and external ear normal.      Left Ear: Tympanic membrane, ear canal and external ear normal.      Nose: Congestion present.      Mouth/Throat:      Mouth: Mucous membranes are moist.      Pharynx: Posterior oropharyngeal erythema (mild) present.   Eyes:      Extraocular Movements: Extraocular movements intact.      Conjunctiva/sclera: Conjunctivae normal.   Cardiovascular:      Rate and Rhythm: Normal rate.      Heart sounds: Normal heart sounds.   Pulmonary:      Effort: Pulmonary effort is normal. No respiratory distress.      Breath sounds: Normal breath sounds. No wheezing.   Abdominal:      General: Abdomen is flat. There is no distension.   Musculoskeletal:         General: Normal range of motion.      Cervical back: Normal range of motion and neck supple.   Lymphadenopathy:      Cervical: No cervical adenopathy.   Skin:     General: Skin is warm and dry.      Capillary Refill: Capillary refill takes less than 2 seconds.      Coloration: Skin is not pale.      Findings: No rash.   Neurological:      General: No focal deficit present.      Mental Status: She is alert and oriented to person, place, and time. Mental status is at baseline.   Psychiatric:         Mood and Affect: Mood normal.         Speech: Speech normal. Speech is not rapid and pressured, delayed or slurred.         Behavior: Behavior normal. Behavior is not agitated, slowed, aggressive, withdrawn, hyperactive or combative. Behavior is cooperative.         Thought Content: Thought content normal.         Judgment: Judgment normal.       Health Maintenance   Topic Date Due    TETANUS VACCINE  03/08/2025 (Originally 10/15/1953)    Lipid Panel  03/11/2029    Shingles Vaccine  Discontinued     DISCLAIMER: This note was compiled by using a speech recognition dictation system and therefore please be aware that typographical / speech recognition  errors can and do occur.  Please contact me if you see any errors specifically.  Consent was obtained for [employer] recording system prior to the visit.

## 2024-11-20 ENCOUNTER — OFFICE VISIT (OUTPATIENT)
Dept: ORTHOPEDICS | Facility: CLINIC | Age: 89
End: 2024-11-20
Payer: MEDICARE

## 2024-11-20 ENCOUNTER — HOSPITAL ENCOUNTER (OUTPATIENT)
Dept: RADIOLOGY | Facility: HOSPITAL | Age: 89
Discharge: HOME | End: 2024-11-20
Attending: ORTHOPAEDIC SURGERY
Payer: MEDICARE

## 2024-11-20 DIAGNOSIS — M54.16 LUMBAR RADICULOPATHY: Primary | ICD-10-CM

## 2024-11-20 DIAGNOSIS — M25.552 LEFT HIP PAIN: ICD-10-CM

## 2024-11-20 DIAGNOSIS — M25.552 LEFT HIP PAIN: Primary | ICD-10-CM

## 2024-11-20 PROCEDURE — 99203 OFFICE O/P NEW LOW 30 MIN: CPT | Mod: S$PBB,,, | Performed by: ORTHOPAEDIC SURGERY

## 2024-11-20 PROCEDURE — 73502 X-RAY EXAM HIP UNI 2-3 VIEWS: CPT | Mod: TC,PO,LT

## 2024-11-20 PROCEDURE — 73502 X-RAY EXAM HIP UNI 2-3 VIEWS: CPT | Mod: 26,LT,, | Performed by: RADIOLOGY

## 2024-11-20 NOTE — PROGRESS NOTES
89 years old developed pain in her left hip area yesterday with radiation down her leg    Exam shows straight leg raise testing is positive no signs infection or instability     X-rays show mild degenerative changes     Assessment:  Lumbar radicular symptoms left lower extremity symptoms times a day     Plan: Symptomatic care, oral anti-inflammatories, heating pad, home exercise program, follow-up as needed

## 2024-12-02 ENCOUNTER — OFFICE VISIT (OUTPATIENT)
Dept: FAMILY MEDICINE | Facility: CLINIC | Age: 89
End: 2024-12-02
Payer: MEDICARE

## 2024-12-02 VITALS
SYSTOLIC BLOOD PRESSURE: 130 MMHG | BODY MASS INDEX: 26.52 KG/M2 | HEART RATE: 85 BPM | WEIGHT: 165 LBS | DIASTOLIC BLOOD PRESSURE: 76 MMHG | OXYGEN SATURATION: 97 % | TEMPERATURE: 98 F | HEIGHT: 66 IN | RESPIRATION RATE: 16 BRPM

## 2024-12-02 DIAGNOSIS — M54.42 ACUTE LEFT-SIDED LOW BACK PAIN WITH LEFT-SIDED SCIATICA: Primary | ICD-10-CM

## 2024-12-02 PROCEDURE — 99999 PR PBB SHADOW E&M-EST. PATIENT-LVL V: CPT | Mod: PBBFAC,,, | Performed by: NURSE PRACTITIONER

## 2024-12-02 PROCEDURE — 99214 OFFICE O/P EST MOD 30 MIN: CPT | Mod: S$PBB,,, | Performed by: NURSE PRACTITIONER

## 2024-12-02 PROCEDURE — 99215 OFFICE O/P EST HI 40 MIN: CPT | Mod: PBBFAC,PO | Performed by: NURSE PRACTITIONER

## 2024-12-02 RX ORDER — DICLOFENAC SODIUM 75 MG/1
75 TABLET, DELAYED RELEASE ORAL 2 TIMES DAILY PRN
Qty: 60 TABLET | Refills: 0 | Status: SHIPPED | OUTPATIENT
Start: 2024-12-02

## 2024-12-02 NOTE — PROGRESS NOTES
Assessment/Plan:  Problem List Items Addressed This Visit    None  Visit Diagnoses       Acute left-sided low back pain with left-sided sciatica    -  Primary    Relevant Medications    diclofenac (VOLTAREN) 75 MG EC tablet    Other Relevant Orders    Ambulatory referral/consult to Physical/Occupational Therapy        Trial of diclofenac  Avoid steroids due to allergy  Recommend physical therapy  Supportive care- rest, ice, heat, avoid heavy lifting, limit strenuous activity.   Follow up if symptoms worsen or fail to improve.  ER precautions for severe or worsening symptoms.     Radha Dunn NP  _____________________________________________________________________________________________________________________________________________________    CC: back pain    HPI: Patient is an 89-year-old female who presents in clinic today as an established patient here for back pain. She reports experiencing left-sided back pain for about x2 weeks with onset around 11/20/24. The pain is located to left low back/left buttock. The pain radiates down the left leg, with a severity of 10/10 at worst. The pain started suddenly while walking. No recent injuries, falls, or known trauma. The pain worsens when lifting the leg, standing, walking. The pain is constant. No known history of previous back problems or surgeries. She saw ortho on 11/20 who ordered and x-ray of left hip which showed chronic degenerative changes and recommended supportive measures. She is taking Tylenol Arthritis and also tried using a TENS unit. There has been minimal improvement. She is allergic to steroids and previously experienced itching as a reaction to steroids. Pertinent negatives include no abdominal pain, bladder incontinence, bowel incontinence, dysuria, fever, chills, headaches, leg pain, paresis, paresthesias, pelvic pain, or weight loss.    X-Ray Hip 2 or 3 views Left with Pelvis when performed  Narrative: EXAMINATION:  XR HIP WITH PELVIS WHEN  PERFORMED 2 OR 3 VIEWS LEFT    CLINICAL HISTORY:  Pain in left hip    TECHNIQUE:  AP view of the pelvis and frog leg lateral view of the left hip were performed.    COMPARISON:  None    FINDINGS:  Mild DJD.  Hip joint spaces are slightly narrowed bilaterally.  No fracture or dislocation.  No bone destruction identified  Impression: See above    Electronically signed by: Obdulio Hilton MD  Date:    2024  Time:    12:30    Past Medical History:  Past Medical History:   Diagnosis Date    Depression      Past Surgical History:   Procedure Laterality Date    APPENDECTOMY      HERNIA REPAIR      SINUS SURGERY      TONSILLECTOMY       Review of patient's allergies indicates:   Allergen Reactions    Celestone [betamethasone sodium phosphate] Itching     Flushing to face    Tessalon [benzonatate] Other (See Comments)    Gabapentin     Zoloft [sertraline]      Made her feel strange    Amoxicillin Anxiety     Headaches      Social History     Tobacco Use    Smoking status: Former     Current packs/day: 0.00     Types: Cigarettes     Quit date: 11/15/1985     Years since quittin.0    Smokeless tobacco: Never   Substance Use Topics    Alcohol use: No     Comment: rare     Family History   Problem Relation Name Age of Onset    Melanoma Neg Hx      Psoriasis Neg Hx      Lupus Neg Hx      Eczema Neg Hx       Current Outpatient Medications on File Prior to Visit   Medication Sig Dispense Refill    ALPRAZolam (XANAX) 0.25 MG tablet Take 1 tablet (0.25 mg total) by mouth daily as needed for Anxiety (take 1/2 to 1 tablet as needed). 30 tablet 0    clotrimazole-betamethasone 1-0.05% (LOTRISONE) cream Apply topically 2 (two) times daily. 60 g 0    doxycycline (VIBRAMYCIN) 100 MG Cap Take 1 capsule (100 mg total) by mouth every 12 (twelve) hours. 14 capsule 0    fluticasone propionate (FLONASE) 50 mcg/actuation nasal spray Use 2 puffs in each nostril q day. 16 g 12    hydrocortisone 2.5 % cream Apply topically 2 (two) times  "daily. 28 g 5    loratadine (CLARITIN) 10 mg tablet Take 1 tablet (10 mg total) by mouth once daily. 90 tablet 4    multivit with minerals/lutein (MULTIVITAMIN 50 PLUS ORAL) Take 1 tablet by mouth.      omeprazole (PRILOSEC) 40 MG capsule Take 1 capsule (40 mg total) by mouth once daily. 90 capsule 3    sod chlor-bicarb-squeez bottle (NEILMED SINUS RINSE COMPLETE) pkdv 1 Units by sinus irrigation route 2 (two) times a day. Not right before bed 1 each 11    vit A/vit C/vit E/zinc/copper (PRESERVISION AREDS ORAL) Take 1 tablet by mouth once daily.      vitamin D (VITAMIN D3) 1000 units Tab Take 1,000 Units by mouth once daily.      zinc gluconate 50 mg tablet Take 50 mg by mouth once daily.       No current facility-administered medications on file prior to visit.     Review of Systems   Constitutional:  Negative for appetite change, chills, fatigue and fever.   HENT:  Negative for congestion, rhinorrhea and sore throat.    Eyes:  Negative for visual disturbance.   Respiratory:  Negative for cough and shortness of breath.    Cardiovascular:  Negative for chest pain, palpitations and leg swelling.   Gastrointestinal:  Negative for abdominal pain, diarrhea and vomiting.   Genitourinary:  Negative for difficulty urinating, dysuria and hematuria.   Musculoskeletal:  Positive for arthralgias, back pain and gait problem. Negative for myalgias.   Skin:  Negative for rash and wound.   Neurological:  Positive for weakness. Negative for dizziness and headaches.   Psychiatric/Behavioral:  Negative for behavioral problems. The patient is not nervous/anxious.      Vitals:    12/02/24 1136   BP: 130/76   Pulse: 85   Resp: 16   Temp: 98.1 °F (36.7 °C)   TempSrc: Oral   SpO2: 97%   Weight: 74.8 kg (165 lb)   Height: 5' 6" (1.676 m)     Wt Readings from Last 3 Encounters:   12/02/24 74.8 kg (165 lb)   09/30/24 74.6 kg (164 lb 6.4 oz)   09/19/24 74 kg (163 lb 3.2 oz)     Physical Exam  Vitals reviewed.   Constitutional:       General: " She is not in acute distress.     Appearance: Normal appearance. She is not ill-appearing.   HENT:      Head: Normocephalic and atraumatic.      Right Ear: External ear normal.      Left Ear: External ear normal.      Nose: Nose normal.   Eyes:      Extraocular Movements: Extraocular movements intact.      Conjunctiva/sclera: Conjunctivae normal.   Cardiovascular:      Rate and Rhythm: Normal rate.      Heart sounds: Normal heart sounds.   Pulmonary:      Effort: Pulmonary effort is normal. No respiratory distress.      Breath sounds: Normal breath sounds.   Abdominal:      General: Abdomen is flat. There is no distension.   Musculoskeletal:      Cervical back: Normal range of motion.      Lumbar back: Tenderness (left sided/buttock area) present. No bony tenderness. Normal range of motion.   Skin:     General: Skin is warm and dry.      Capillary Refill: Capillary refill takes less than 2 seconds.      Coloration: Skin is not pale.      Findings: No rash.   Neurological:      General: No focal deficit present.      Mental Status: She is alert and oriented to person, place, and time. Mental status is at baseline.      Sensory: Sensation is intact. No sensory deficit.      Motor: Weakness (generalized) present. No tremor or seizure activity.      Coordination: Coordination is intact. Coordination normal.      Gait: Gait abnormal (slowed).   Psychiatric:         Mood and Affect: Mood normal.         Speech: Speech normal. Speech is not rapid and pressured, delayed or slurred.         Behavior: Behavior normal. Behavior is not agitated, slowed, aggressive, withdrawn, hyperactive or combative. Behavior is cooperative.         Thought Content: Thought content normal.         Judgment: Judgment normal.       Health Maintenance   Topic Date Due    RSV Vaccine (Age 60+ and Pregnant patients) (1 - 1-dose 75+ series) Never done    TETANUS VACCINE  03/08/2025 (Originally 10/15/1953)    Hemoglobin A1c (Prediabetes)  03/11/2025     Lipid Panel  03/11/2029    Influenza Vaccine  Completed    Pneumococcal Vaccines (Age 65+)  Completed    Shingles Vaccine  Discontinued    COVID-19 Vaccine  Discontinued     DISCLAIMER: This note was compiled by using a speech recognition dictation system and therefore please be aware that typographical / speech recognition errors can and do occur.  Please contact me if you see any errors specifically.  Consent was obtained for [employer] recording system prior to the visit.

## 2024-12-03 ENCOUNTER — TELEPHONE (OUTPATIENT)
Dept: FAMILY MEDICINE | Facility: CLINIC | Age: 89
End: 2024-12-03
Payer: MEDICARE

## 2024-12-03 NOTE — TELEPHONE ENCOUNTER
Call returned. Spoke with pt and she stated that she is in worst pain than she was at her OV on yesterday, 12/02/2024 with her sciatica. Pt was advised that the medication Radha prescribed to her at her OV, diclofenac, takes times for the medication to take effect. Pt was also advised that an order was placed for her to start PT; once approved her her insurance, she will be notified. Pt was told that she wants her granddaughter Yolanda to call the office and speak on her behalf, that she will need to update her release of information next time she comes into the office. Pt verbalized understanding and stated that she will give the medication a couple more days and f/u with the office by the end of the week.

## 2024-12-03 NOTE — TELEPHONE ENCOUNTER
----- Message from Marcelle sent at 12/3/2024  8:41 AM CST -----  Contact: Yolanda/grandmother  Pt granddaughter is calling in regards to the pt in a lot of pain.please call back at [phone]        Thanks  KAJAL

## 2024-12-08 ENCOUNTER — PATIENT MESSAGE (OUTPATIENT)
Dept: FAMILY MEDICINE | Facility: CLINIC | Age: 89
End: 2024-12-08
Payer: MEDICARE

## 2024-12-09 ENCOUNTER — TELEPHONE (OUTPATIENT)
Dept: FAMILY MEDICINE | Facility: CLINIC | Age: 89
End: 2024-12-09
Payer: MEDICARE

## 2024-12-09 ENCOUNTER — PATIENT MESSAGE (OUTPATIENT)
Dept: FAMILY MEDICINE | Facility: CLINIC | Age: 89
End: 2024-12-09
Payer: MEDICARE

## 2024-12-09 DIAGNOSIS — M54.42 ACUTE LEFT-SIDED LOW BACK PAIN WITH LEFT-SIDED SCIATICA: Primary | ICD-10-CM

## 2024-12-09 NOTE — TELEPHONE ENCOUNTER
Please schedule patient with pain management for further evaluation soon.    I have signed for the following orders AND/OR meds.  Please call the patient and ask the patient to schedule the testing AND/OR inform about any medications that were sent.     Orders Placed This Encounter   Procedures    Ambulatory referral/consult to Pain Clinic     Standing Status:   Future     Standing Expiration Date:   1/9/2026     Referral Priority:   Routine     Referral Type:   Consultation     Referral Reason:   Specialty Services Required     Requested Specialty:   Pain Medicine     Number of Visits Requested:   1

## 2024-12-09 NOTE — TELEPHONE ENCOUNTER
Patient was seen by you and her son states that she is in a lot of pain and wants to know what else can be done because the meds that was given isn't working

## 2024-12-09 NOTE — TELEPHONE ENCOUNTER
----- Message from Neymar sent at 12/9/2024  1:21 PM CST -----  Type:  Patient Returning Call    Who Called: MARISELA Doran [[id number]]  Who Left Message for Patient:Giovanna Rios LPN  Does the patient know what this is regarding?:  Would the patient rather a call back or a response via [facility]?   Best Call Back Number:[phone]  Additional Information:

## 2024-12-11 ENCOUNTER — CLINICAL SUPPORT (OUTPATIENT)
Dept: REHABILITATION | Facility: HOSPITAL | Age: 89
End: 2024-12-11
Payer: MEDICARE

## 2024-12-11 DIAGNOSIS — M54.42 ACUTE LEFT-SIDED LOW BACK PAIN WITH LEFT-SIDED SCIATICA: ICD-10-CM

## 2024-12-11 DIAGNOSIS — Z74.09 IMPAIRED MOBILITY AND ACTIVITIES OF DAILY LIVING: Primary | ICD-10-CM

## 2024-12-11 DIAGNOSIS — Z78.9 IMPAIRED MOBILITY AND ACTIVITIES OF DAILY LIVING: Primary | ICD-10-CM

## 2024-12-11 PROCEDURE — 97110 THERAPEUTIC EXERCISES: CPT | Mod: PN | Performed by: GENERAL ACUTE CARE HOSPITAL

## 2024-12-11 PROCEDURE — 97112 NEUROMUSCULAR REEDUCATION: CPT | Mod: PN | Performed by: GENERAL ACUTE CARE HOSPITAL

## 2024-12-11 PROCEDURE — 97161 PT EVAL LOW COMPLEX 20 MIN: CPT | Mod: PN | Performed by: GENERAL ACUTE CARE HOSPITAL

## 2024-12-11 NOTE — PLAN OF CARE
[facility]   Physical Therapy Initial Evaluation    Name: Peg Moreno  Clinic Number: [id number]    Therapy Diagnosis:   Encounter Diagnoses   Name Primary?    Acute left-sided low back pain with left-sided sciatica     Impaired mobility and activities of daily living Yes     Physician: Radha Dunn NP    Physician Orders: Gait Training  Medical Diagnosis from Referral: acute left-sided low back pain with left-sided sciatica   Evaluation Date: 12/11/2024  Authorization Period Expiration: 12/31/24  Plan of Care Expiration: 2/19/25 (10 weeks)  Progress Note Due: 1/11/25  Visit # / Visits authorized: 1/ 1 Evaluation   FOTO: 1/3 - Lumbar  N/A /100 (to be performed at 1st visit)    Precautions: Fall     Date: 12/11/2024   Time In: 1110  Time Out: 1205  Total Appointment Time: 55 minutes  Subjective   Date of onset: approx 3 weeks prior (~11/20/24)  History of current condition - Shahid reports: approx 3 weeks ago falling in the kitchen on her L side and L elbow. Appro 3 days after the fall patient reports increased pain in the posterior hips more so on the L side. She was seen by Dr. Joshi who took x-rays and determined that her hip was not involved. He dx the patient with sciatica and was provided with some exercises to perform at home. Patient continued to experience debilitating pain 10/10 and was referred to her PCP who referred her to physical therapist. Patient is very agitated at evaluation this morning and reports high levels of pain in the posterior hip and glute region. Patient asks the physical therapist for medications and injections to alleviate her pain. Long discussion and education was provided to patient and family regarding role of physical therapy, scope of practice, nature of her condition and interventions available by physical therapy- os which did not included pain medication or injections  Surgical: [x]No []Yes (procedure:   )  Weight Bearing  Status: WBAT  Dominant Extremity: R handed   Falls: Yes- fall with injury to onset current status/symptoms   Imaging: X-ray L hip (unremarkable)    Prior Therapy: none for this condition   Social History: lives with their family (adult Son) / patient does not drive currently  Occupation: Not working   Prior Level of Function: independent (no AD)  Current Level of Function: Stand by assist, using rollator ( 's), unable to drive due to not being able to place rollator in the car    Pain:  Current 8/10, worst 10/10, best 5/10   Location: bilateral gutes and lower back, left lower extremity radicular s/s  Description: Aching, Grabbing, Tight, Tingling, Sharp, Electric, and Shooting  Aggravating Factors: Sitting, Standing, Flexing, and Getting out of bed/chair  Easing Factors: relaxation and rest    Patients goals: to reduce pain, walk without AD      Medical History:   Past Medical History:   Diagnosis Date    Depression      Surgical History:   Peg Moreno  has a past surgical history that includes Sinus surgery; Hernia repair; Tonsillectomy; and Appendectomy.  Medications:   Peg has a current medication list which includes the following prescription(s): alprazolam, clotrimazole-betamethasone 1-0.05%, diclofenac, doxycycline, fluticasone propionate, hydrocortisone, loratadine, multivit with minerals/lutein, omeprazole, neilmed sinus rinse complete, vit a/vit c/vit e/zinc/copper, vitamin d, and zinc gluconate.  Allergies:   Review of patient's allergies indicates:   Allergen Reactions    Celestone [betamethasone sodium phosphate] Itching     Flushing to face    Tessalon [benzonatate] Other (See Comments)    Gabapentin     Zoloft [sertraline]      Made her feel strange    Amoxicillin Anxiety     Headaches         Objective    NT = not tested due to pain, inability to obtain test position, or relevancy     RANGE OF MOTION:  Hip AROM/PROM Right  2024 Left  2024   Hip IR (45)  40 40   Hip ER (45) 35 35     Spinal Range of Motion:  Lumbar Flexion: 75%  Lumbar Extension: 50%  Lumbar R Side Bend: 50%  Lumbar L Side Bend: 50%    Thoracic Extension: 0%    STRENGTH:  L/E MMT Left  2024 Right  2024 Goal   Hip Flexion  4+/5 4+/5 5/5 B    Hip Extension  4/5 4/5 5/5 B   Hip Abduction  4/5 4/5 5/5 B   Hip IR 4+/5 4+/5 5/5 B   Hip ER 4/5 4/5 5/5 B     SPECIAL TESTS:  Piriformis: (+) bilateral    Edema: none noted     Sensation:  Sensation is intact to light touch  Palpation: Increased tone and tenderness noted with palpation to: glutes, bilateral piriformis   Posture:  Pt presents with postural abnormalities which include  []None  [x]Forward head  [x]Rounded shoulders  [x]Thoracic Kyphosis-increased   []Lumbar Lordosis  [x]Slouched Sitting or Standing  Gait Analysis:   Assistive device:  []None []Crutches []Straight Cane []Rolling Walker  [x] Other: rollator ( 's)  Gait abnormalities:   []No significant gait deviations noted  []Increased KENNETH  [x]Anterior Trunk Lean  []Increased Knee Flexion in Stance  []Knee Hyperextension in stance  [x]Foot Drop/Drag  [x]Decreased toe off - L  [x]Decreased heel strike - L  []Trendelenburg  [x]Other: decreased rene, decreased step length     Limitation/Restriction for FOTO Lumbar Survey  Therapist reviewed FOTO scores for Shahid on 2024 .   FOTO documents entered into [employer] - see Media section.  Score: N/A /100 - performed at 1st visit     Treatment   Total Treatment time (time-based codes) separate from Evaluation: 25 minutes     Shahid received following skilled interventions listed below:    PT Intervention Parameters Time   Therapeutic Exercise to develop strength, endurance, ROM, flexibility, posture, and core stabilization Home exercise program - Nature of condition, patient/family education  15 minutes   Neuromuscular Re-education activities to improve: Balance, Coordination, Kinesthetic, Sense, Proprioception, and Posture   Home exercise program - See media 10 minutes     Patient Education and Home Exercises   Education provided:   Patient was educated on the role of Physical Therapy, Plan of Care, treatment plan, discharge goals and clinic call/cancel/no show policy.  Patient educated on biomechanical justification for physical therapy and importance of compliance with Home Exercise Program in order to improve overall impairments and Quality of Life.    Clinic Policies:  Patient was provided with physical copy of [facility] Policies necessary for therapy treatment sessions including: Attendance, Clothing, Cell Phone and Visitors.   Patient was additionally provided with contact information for the clinic including phone, fax and physical address for reference.   Patient verbalizes that they have received this information and is agreeable to follow these policies.     Written Home Exercises Provided: yes.   Exercises were reviewed and Peg  was able to demonstrate them prior to the end of the session.    Peg  demonstrated fair  understanding of the education provided.  See EMR under Patient Instructions for exercises provided during therapy sessions.    Assessment   Peg is a 89 y.o. female referred to outpatient Physical Therapy. Patient presents with s/s consistent with piriformis syndrome of bilateral lower extremities with radicular symptoms down the left lower extremity impacting standing tolerance, gait, mobility and safety awareness with general activities of daily living. Decreased spinal range of motion and postural impairments further impact current symptoms.     Patient is indicated for skilled physical therapy services to impact knowledge of condition, safety awareness and improve upon aforementioned deficits. Patient is pleasant and agreeable to physical therapy plan of care.     Patient prognosis is Fair.   Patient will benefit from skilled outpatient Physical Therapy to address the deficits stated  above and in the chart below, provide patient /family education, and to maximize patientt's level of independence.     Plan of care discussed with: []Patient []Family []Patient/Family  Patient's spiritual, cultural and educational needs considered and patient is agreeable to the plan of care and goals as stated below:     Anticipated barriers for therapy:  []None  []Cognitive  []Emotional  []Hearing  []Learning  [x]Other: age, coping style    Medical Necessity is demonstrated by the following  History  Co-morbidities and personal factors that may impact the plan of care [] LOW: no personal factors / co-morbidities  [x] MODERATE: 1-2 personal factors / co-morbidities  [] HIGH: 3+ personal factors / co-morbidities    Moderate / High Support Documentation:  Age, coping style     Examination  Body Structures and Functions, activity limitations and participation restrictions that may impact the plan of care [] LOW: addressing 1-2 elements  [x] MODERATE: 3+ elements  [] HIGH: 4+ elements (please support below)    Moderate / High Support Documentation:  Body Region / System  Glutes, hip external rotators, muscle tightness/spasm, weakness, trunk/balance impairments, posture  Participation Restrictions  Bending, lifting, twisting,  Activity Limitations  Independent gait, independent self-care skills: grooming, bathing, toileting dressing, driving, cooking cleaning, cooking    Clinical Presentation [x] LOW: stable  [] MODERATE: Evolving  [] HIGH: Unstable     Decision Making/ Complexity Score: low       Goals:  Short Term Goals Status Est. Met   Patient to be independent with foundational home exercise program performance to impact knowledge of condition  [] Met  [] Not Met  [] Progressing 12/11/2024    Patient to complete Chair Rise Test and TUG testing to assess safety with mobility [] Met  [] Not Met  [] Progressing 12/11/2024    Patient to display ability to appropriately contract transvere abdominus and perform posterior  pelvic tilt to impact posture and lumbar position [] Met  [] Not Met  [] Progressing 12/11/2024    Patient to improve lumbar FOTO to  --  /100 to display improved activity participation [] Met  [] Not Met  [] Progressing 12/11/2024      Long Term Goal Status Est. Met   Patient will display independent and correct performance of advanced home exercise program without cueing to impact knowledge of condition [] Met  [] Not Met  [] Progressing 12/11/2024    Patient to ambulate household distances without use of AD to impact mobility and return to PLOF [] Met  [] Not Met  [] Progressing 12/11/2024    Patient to improve scores on TUG and Chair Rise Test to decrease falls risk and display improve strength and safety with mobility  [] Met  [] Not Met  [] Progressing 12/11/2024    Patient to improve lumbar  FOTO to -- /100 to display improved activity participation [] Met  [] Not Met  [] Progressing 12/11/2024    Patient will report confidence in managing condition upon discharge from Physical Therapy. [] Met  [] Not Met  [] Progressing 12/11/2024      Plan   Plan of care Certification: 12/11/2024 to 2/19/25.    Outpatient Physical Therapy 1-2 times weekly for 10 weeks to include the following interventions: Cervical/Lumbar Traction, Gait Training, Manual Therapy, Moist Heat/ Ice, Neuromuscular Re-ed, Orthotic Management and Training, Paraffin, Patient Education, Self Care, Therapeutic Activities, and Therapeutic Exercise , Electrical Stimulation (IFC, Russian), IASTM, STM, Cupping, Blood Flow Restriction as appropriate.    Karin Carrizales PT, DPT, SCS, CSCS  Board Certified Sports Clinical Specialist   Certified Dry Needling Provider  12/11/2024 al

## 2024-12-12 ENCOUNTER — CLINICAL SUPPORT (OUTPATIENT)
Dept: REHABILITATION | Facility: HOSPITAL | Age: 89
End: 2024-12-12
Payer: MEDICARE

## 2024-12-12 ENCOUNTER — TELEPHONE (OUTPATIENT)
Dept: PAIN MEDICINE | Facility: CLINIC | Age: 89
End: 2024-12-12
Payer: MEDICARE

## 2024-12-12 DIAGNOSIS — Z78.9 IMPAIRED MOBILITY AND ACTIVITIES OF DAILY LIVING: Primary | ICD-10-CM

## 2024-12-12 DIAGNOSIS — Z74.09 IMPAIRED MOBILITY AND ACTIVITIES OF DAILY LIVING: Primary | ICD-10-CM

## 2024-12-12 PROCEDURE — 20561 NDL INSJ W/O NJX 3+ MUSC: CPT | Mod: PN | Performed by: GENERAL ACUTE CARE HOSPITAL

## 2024-12-12 PROCEDURE — 97110 THERAPEUTIC EXERCISES: CPT | Mod: PN | Performed by: GENERAL ACUTE CARE HOSPITAL

## 2024-12-12 PROCEDURE — 97112 NEUROMUSCULAR REEDUCATION: CPT | Mod: PN | Performed by: GENERAL ACUTE CARE HOSPITAL

## 2024-12-12 PROCEDURE — 97014 ELECTRIC STIMULATION THERAPY: CPT | Mod: PN | Performed by: GENERAL ACUTE CARE HOSPITAL

## 2024-12-12 PROCEDURE — 97140 MANUAL THERAPY 1/> REGIONS: CPT | Mod: PN,CG | Performed by: GENERAL ACUTE CARE HOSPITAL

## 2024-12-12 NOTE — TELEPHONE ENCOUNTER
I tried calling the patient son back but no answer and no voicemail is setup yet.     Chana ESPINOZA ([facility])

## 2024-12-12 NOTE — TELEPHONE ENCOUNTER
----- Message from Areli sent at 12/12/2024  3:23 PM CST -----  Contact: August (son)  Mr. Koch needs a call back at [phone] in regards to his mother. He's trying to see if she can be seen as soon as possible, he stated that she's in a lot of pain.    Thanks

## 2024-12-13 ENCOUNTER — TELEPHONE (OUTPATIENT)
Dept: PAIN MEDICINE | Facility: CLINIC | Age: 89
End: 2024-12-13
Payer: MEDICARE

## 2024-12-13 NOTE — TELEPHONE ENCOUNTER
----- Message from Visante sent at 12/13/2024 11:54 AM CST -----  Contact: son  .Type:  Needs Medical Advice    Who Called: August Son  Symptoms (please be specific): she is in extreme pain on her side   How long has patient had these symptoms:  3weeks  Pharmacy name and phone #:  .  [id number] - [place], [place] - [address]  [address]  [place] [address]  Phone: [phone] Fax: [phone]    Would the patient rather a call back or a response via [facility]? Call back  Best Call Back Number: [phone]  Additional Information: Please give the son a call he is needing something for his mother stating the pain medication prescribed is not working. And he would like to come in but is medication can be sent over faster. He would prefer that. Please call him back.

## 2024-12-13 NOTE — TELEPHONE ENCOUNTER
I called back and spoke with the patient and explained to her that Dr. Morrow has to see her as a patient and we only do injections we don't do opioid medications. I was able to get the patient rescheduled and I also added her to the waiting list as well. Patient does understand.    Chana ESPINOZA ([facility])

## 2024-12-13 NOTE — PROGRESS NOTES
[facility]   Physical Therapy Treatment Note    Name: Peg Moreno  Clinic Number: [id number]    Therapy Diagnosis:   Encounter Diagnosis   Name Primary?    Impaired mobility and activities of daily living Yes     Physician: Radha Dunn NP  Physician Orders: Gait Training  Medical Diagnosis from Referral: acute left-sided low back pain with left-sided sciatica   Evaluation Date: 2024  Authorization Period Expiration: 24  Plan of Care Expiration: 25 (10 weeks)  Progress Note Due: 25  Visit # / Visits authorized:  Evaluation   FOTO: 1/3 - Lumbar  N/A /100 (to be performed at 1st visit)    Visit Date: 2024  Time In: 1430  Time Out: 1530  Total Appointment Time: 60 minutes  PTA Visit #: 0/5     Treatment No Show #:  Subjective   Pt reports: Patient returns to outpatient physical therapy with nine out of 10 pain in the bilateral glutes. Patients son is also present during session today and states that mother is in so much pain. They are considering going to the hospital.    She was not compliant with home exercise program.   Response to previous treatment: 1st treatment today  Functional change: 1st treatment today    Pain: 9/10  Location: bilateral deep glutes      Objective      Objective Measures updated at progress report unless specified.     Treatment   Shahid received following skilled interventions listed below:      CPT Interventions & Parameters  Date: 24   TE Patient family education / discussion with Son: August BURGOS ILANA at counter x10 reps  Seated ball rollouts- attempted increased pain    MT  +  DN 3+  +  UES Dry Needling: Patient prone lying  Glute Max (janine) 60mm x2  Glute Med (janine) 60mm x2  Piriformis (janine) 60 mm x2  With electrical stimulation                       PLAN        Timed CPT Codes available for Billin(15)minutes of Therapeutic Exercise 38437 (TE) to develop: strength, endurance, ROM, flexibility,  posture, and core stabilization  1(15)minutes of Neuromuscular Re-education 04423 (NMR) activities to improve: Balance, Coordination, Kinesthetic, Sense, Proprioception, and Posture   ( ) minutes of Therapeutic Activities 06648 (TA) to improve: functional performance, impact activities of daily living  2(25) minutes of Manual Therapy 55310 (MT) techniques to improve: Joint mobilizations, Manual traction, Myofacial release, Soft tissue Mobilization, and Friction Massage   ( ) minutes of Gait Training 07291(GT) to improve: functional mobility, gait mechanics, safety with locomotion        *A portion of this treatment session is provided with the assistance of a skilled rehabilitation technician under the supervision of a licensed physical therapist.   *Billing accurately reflects 1:1 treatment time per patient's insurance guidelines.      Untimed CPT Codes available for Billing:   ( ) Dry Needling 75922 (DN 1-2) to impact muscular pain, tightness, trigger points, and tissue pliability  (X)Dry Needling 42173 (DN 3+) to impact muscular pain, tightness, trigger points, and tissue pliability  (X)Unattended Electrical Stimulation 16346 (UES) for muscle performance or pain modulation  ( ) Heat/Cold Modalities 56081 (H/C) to impact pain   ( ) Mechanical Traction 50596 (T) to impact pain, tissue pliability, range of motion    Patient Education and Home Exercises     Home Exercises Provided and Patient Education Provided   Patient was educated on the role of PT, POC, treatment plan, discharge goals, HEP.  Patient educated on biomechanical justification for therapeutic exercise and importance of compliance with HEP in order to improve overall impairments and QOL   Patient was educated on all the above exercise prior/during/after for proper posture, positioning, and execution for safe performance with home exercise program.       Written Home Exercises Provided:   Patient instructed to cont prior HEP.   Exercises were reviewed  and Shahid was able to demonstrate them prior to the end of the session.    Shahid demonstrated fair  understanding of the education provided.   See EMR under Patient Instructions for exercises provided during therapy sessions    Assessment     Attempted to perform lumbar mobilization exercises today. Repeated extensions are performed with decrease numbness and tingling down. The left. Lower extremity repeated flexion increases pain in the glutes and left lower extremity and exercise must be discontinued. Due to patients high level of pain, the only intervention that was able to be successfully performed today was repeated extensions at the counter and dry needling and partial prone Lying. Patient reports resolution of all symptoms in the prone, lying position. Patient verbally and written consent for dry needling today.    Shahid Is not progressing well towards her goals.   Pt prognosis is Fair.   Pt will continue to benefit from skilled outpatient physical therapy to address the deficits listed in the problem list box on initial evaluation, provide pt/family education and to maximize pt's level of independence in the home and community environment.   Pt's spiritual, cultural and educational needs considered and pt agreeable to plan of care and goals.  Anticipated barriers to physical therapy: coping style, age, transportation, pain management     Goals:  Short Term Goals Status Est. Met   Patient to be independent with foundational home exercise program performance to impact knowledge of condition  [] Met  [] Not Met  [] Progressing 12/11/2024     Patient to complete Chair Rise Test and TUG testing to assess safety with mobility [] Met  [] Not Met  [] Progressing 12/11/2024     Patient to display ability to appropriately contract transvere abdominus and perform posterior pelvic tilt to impact posture and lumbar position [] Met  [] Not Met  [] Progressing 12/11/2024     Patient to improve lumbar FOTO to  --   /100 to display improved activity participation [] Met  [] Not Met  [] Progressing 12/11/2024        Long Term Goal Status Est. Met   Patient will display independent and correct performance of advanced home exercise program without cueing to impact knowledge of condition [] Met  [] Not Met  [] Progressing 12/11/2024     Patient to ambulate household distances without use of AD to impact mobility and return to PLOF [] Met  [] Not Met  [] Progressing 12/11/2024     Patient to improve scores on TUG and Chair Rise Test to decrease falls risk and display improve strength and safety with mobility  [] Met  [] Not Met  [] Progressing 12/11/2024     Patient to improve lumbar  FOTO to -- /100 to display improved activity participation [] Met  [] Not Met  [] Progressing 12/11/2024     Patient will report confidence in managing condition upon discharge from Physical Therapy. [] Met  [] Not Met  [] Progressing 12/11/2024        Plan   Plan of care Certification: 12/11/2024 to 2/19/25.     Outpatient Physical Therapy 1-2 times weekly for 10 weeks to include the following interventions: Cervical/Lumbar Traction, Gait Training, Manual Therapy, Moist Heat/ Ice, Neuromuscular Re-ed, Orthotic Management and Training, Paraffin, Patient Education, Self Care, Therapeutic Activities, and Therapeutic Exercise , Electrical Stimulation (IFC, Russian), IASTM, STM, Cupping, Blood Flow Restriction as appropriate.      Karin Carrizales PT, DPT, SCS, CSCS  Board Certified Sports Clinical Specialist   Certified Dry Needling Provider  12/13/2024      Disclaimer: This note was prepared using a voice recognition system and is likely to have sound alike errors within the text.

## 2024-12-16 ENCOUNTER — TELEPHONE (OUTPATIENT)
Dept: FAMILY MEDICINE | Facility: CLINIC | Age: 89
End: 2024-12-16
Payer: MEDICARE

## 2024-12-16 NOTE — TELEPHONE ENCOUNTER
Call returned. Pt stated that the medication that was prescribed to her on, 12/02/2024 has not helped with her pain. Pt was advised that she was referred to pain management, Dr. Morrow per Radha at her last OV. Pt was scheduled to see pain management on, 12/13/2024, but canceled that apt and now has to wait until 02/2025. Pt was advised that she can contact Dr. Morrow office and request to be placed on the cancellation list to get a sooner apt. Pt also, mentioned that she has lost moisture all over her body and would like to address that as well. Apt was scheduled and pt verbalized understanding.

## 2024-12-16 NOTE — TELEPHONE ENCOUNTER
----- Message from Aliza sent at 12/16/2024  9:08 AM CST -----  Contact: self  [phone]  Patient is requesting a call back. The Rx for diclofenac (VOLTAREN) 75 MG EC tablet that was given to her is not working. Please call to advise

## 2024-12-18 ENCOUNTER — PATIENT MESSAGE (OUTPATIENT)
Dept: FAMILY MEDICINE | Facility: CLINIC | Age: 89
End: 2024-12-18

## 2024-12-18 ENCOUNTER — OFFICE VISIT (OUTPATIENT)
Dept: FAMILY MEDICINE | Facility: CLINIC | Age: 89
End: 2024-12-18
Payer: MEDICARE

## 2024-12-18 VITALS
RESPIRATION RATE: 16 BRPM | OXYGEN SATURATION: 98 % | HEART RATE: 88 BPM | WEIGHT: 159.38 LBS | HEIGHT: 66 IN | BODY MASS INDEX: 25.61 KG/M2 | TEMPERATURE: 98 F | SYSTOLIC BLOOD PRESSURE: 136 MMHG | DIASTOLIC BLOOD PRESSURE: 74 MMHG

## 2024-12-18 DIAGNOSIS — Z79.899 ENCOUNTER FOR LONG-TERM (CURRENT) USE OF MEDICATIONS: Chronic | ICD-10-CM

## 2024-12-18 DIAGNOSIS — D69.6 THROMBOCYTOPENIA, UNSPECIFIED: Chronic | ICD-10-CM

## 2024-12-18 DIAGNOSIS — M54.16 LUMBAR RADICULOPATHY, CHRONIC: Primary | ICD-10-CM

## 2024-12-18 PROCEDURE — G2211 COMPLEX E/M VISIT ADD ON: HCPCS | Mod: S$PBB,,, | Performed by: NURSE PRACTITIONER

## 2024-12-18 PROCEDURE — 99215 OFFICE O/P EST HI 40 MIN: CPT | Mod: PBBFAC,PO | Performed by: NURSE PRACTITIONER

## 2024-12-18 PROCEDURE — 99999PBSHW PR PBB SHADOW TECHNICAL ONLY FILED TO HB: Mod: PBBFAC,,,

## 2024-12-18 PROCEDURE — 99999 PR PBB SHADOW E&M-EST. PATIENT-LVL V: CPT | Mod: PBBFAC,,, | Performed by: NURSE PRACTITIONER

## 2024-12-18 PROCEDURE — 99214 OFFICE O/P EST MOD 30 MIN: CPT | Mod: S$PBB,,, | Performed by: NURSE PRACTITIONER

## 2024-12-18 PROCEDURE — 96372 THER/PROPH/DIAG INJ SC/IM: CPT | Mod: PBBFAC,PO

## 2024-12-18 RX ORDER — TRAMADOL HYDROCHLORIDE 50 MG/1
50 TABLET ORAL EVERY 12 HOURS PRN
Qty: 12 TABLET | Refills: 0 | Status: CANCELLED | OUTPATIENT
Start: 2024-12-18 | End: 2024-12-24

## 2024-12-18 RX ORDER — TRAMADOL HYDROCHLORIDE 50 MG/1
50 TABLET ORAL EVERY 8 HOURS PRN
Qty: 10 TABLET | Refills: 0 | Status: SHIPPED | OUTPATIENT
Start: 2024-12-18

## 2024-12-18 RX ORDER — KETOROLAC TROMETHAMINE 30 MG/ML
30 INJECTION, SOLUTION INTRAMUSCULAR; INTRAVENOUS
Status: COMPLETED | OUTPATIENT
Start: 2024-12-18 | End: 2024-12-18

## 2024-12-18 RX ORDER — KETOROLAC TROMETHAMINE 30 MG/ML
30 INJECTION, SOLUTION INTRAMUSCULAR; INTRAVENOUS
Status: DISCONTINUED | OUTPATIENT
Start: 2024-12-18 | End: 2024-12-18

## 2024-12-18 RX ADMIN — KETOROLAC TROMETHAMINE 30 MG: 60 INJECTION, SOLUTION INTRAMUSCULAR at 11:12

## 2024-12-18 NOTE — Clinical Note
If we can not get MRI lumbar spine approved soon. Recommend get x-ray lumbar spine. Sometimes insurance wants x-rays and up to 6 weeks of conservative therapy (medications/physical therapy) before approving MRI. I have ordered the x-ray.

## 2024-12-18 NOTE — ASSESSMENT & PLAN NOTE
Update x-ray. Ultimately, may need MRI lumbar spine. Toradol IM today. Tramadol provided for severe pain x6 days.  reviewed. Discussed medication risks. Fall precautions. I recommend she follow up with pain management. Referral placed. Supportive care- rest, ice, heat, avoid heavy lifting, limit strenuous activity. Proceed with physical therapy.

## 2024-12-18 NOTE — PROGRESS NOTES
Assessment/Plan:  Problem List Items Addressed This Visit          Neuro    Lumbar radiculopathy, chronic - Primary    Overview     Chronic. Current episode started two months ago. Radiates down left lower extremity. No recent injuries or known trauma. Tried diclofenac with no relief. She is allergic to steroids. She had an adverse reaction to gabapentin in the past. She is currently in physical therapy with no relief.     X-Ray Lumbar Spine AP And Lateral  Narrative & Impression  EXAMINATION:  XR LUMBAR SPINE AP AND LATERAL     CLINICAL HISTORY:  Low back pain, unspecified     TECHNIQUE:  AP, lateral and spot images were performed of the lumbar spine.     COMPARISON:  None     FINDINGS:  There is lumbar scoliosis convex to the left.  The scoliotic angle measures 11° from the superior endplate of T12 to the inferior endplate of L4.  There is degenerative facet arthrosis at the L4-5 and L5-S1 levels.  The facet arthrosis results in 6 mm anterior subluxation of L4.  Vertebral body heights are well maintained.  There are findings of degenerative disc disease at the T12-L1, L1-2, L2-3, and L5-S1 levels with disc narrowing.  The disc degeneration is most severe at L5-S1 where there is severe disc narrowing and vacuum disc phenomenon.  There is extensive aortic atherosclerosis.     Impression:     1. Lumbar scoliosis.  2. Multilevel degenerative disc disease most severe at the L5-S1 level.  3. L4-5 and L5-S1 degenerative facet arthrosis with mild anterior L4 subluxation.     Electronically signed by:Phani Griggs MD  Date:                                            08/23/2022  Time:                                           10:10            Current Assessment & Plan     Update x-ray. Ultimately, may need MRI lumbar spine. Toradol IM today. Tramadol provided for severe pain x6 days.  reviewed. Discussed medication risks. Fall precautions. I recommend she follow up with pain management. Referral placed. Supportive  care- rest, ice, heat, avoid heavy lifting, limit strenuous activity. Proceed with physical therapy.          Relevant Medications    ketorolac injection 30 mg (Completed)    traMADoL (ULTRAM) 50 mg tablet    Other Relevant Orders    MRI Lumbar Spine Without Contrast    X-Ray Lumbar Spine AP And Lateral       Psychiatric    Encounter for long-term (current) use of medications (Chronic)    Overview     December 2024: reviewed labs.  June 2024:CHRONIC. Stable. Compliant with medications for managed conditions. See medication list. No SE reported. Routine lab analysis is being monitored. Refills were addressed.  Lab Results   Component Value Date    WBC 5.60 03/11/2024    HGB 13.4 03/11/2024    HCT 43.8 03/11/2024    MCV 82 03/11/2024     (L) 03/11/2024         Chemistry        Component Value Date/Time     03/11/2024 0740    K 4.2 03/11/2024 0740     03/11/2024 0740    CO2 27 03/11/2024 0740    BUN 21 03/11/2024 0740    CREATININE 0.9 03/11/2024 0740     03/11/2024 0740        Component Value Date/Time    CALCIUM 10.0 03/11/2024 0740    ALKPHOS 59 03/11/2024 0740    AST 20 03/11/2024 0740    ALT 14 03/11/2024 0740    BILITOT 0.4 03/11/2024 0740    ESTGFRAFRICA >60.0 10/25/2021 1018    EGFRNONAA >60.0 10/25/2021 1018          Lab Results   Component Value Date    TSH 2.795 03/11/2024              Current Assessment & Plan     Complete history and physical was completed today.  Complete and thorough medication reconciliation was performed.  Discussed risks and benefits of medications.  Advised patient on orders and health maintenance.  We discussed old records and old labs if available.  Will request any records not available through epic.  Continue current medications listed on your summary sheet.            Hematology    Thrombocytopenia, unspecified (Chronic)    Overview     Chronic.    Lab Results   Component Value Date    WBC 5.60 03/11/2024    HGB 13.4 03/11/2024    HCT 43.8 03/11/2024     MCV 82 03/11/2024     (L) 03/11/2024            Current Assessment & Plan     Stable. Monitor labs. She denies abnormal bleeding. Bleeding precautions.           Follow up if symptoms worsen or fail to improve.  ER precautions for severe or worsening symptoms.     Radha Dunn NP  _____________________________________________________________________________________________________________________________________________________    CC: back pain     HPI: Patient is an 89-year-old female who presents in clinic today as an established patient here with severe leg and back pain. This is an ongoing problem for the last two months. The symptoms are gradually worsening. She experiences pain radiating from the buttocks down the left leg to the ankle and foot. The pain improves with lying down and standing. The pain worsens with sitting. She can only sit for approximately 15 minutes before pain onset. She denies recent injuries, falls, or known trauma. She has tried diclofenac with no relief. She is allergic to steroids. She is currently undergoing physical therapy with exercises and receiving dry needling treatments. She was referred to pain management and recently cancelled the appt due to transportation issues. There is no incontinence of stool or urine.     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 She denies abnormal bleeding. She has a steroid allergy manifesting as facial flushing and experienced an adverse reaction to gabapentin several years ago.     X-Ray Lumbar Spine AP And Lateral  Narrative & Impression  EXAMINATION:  XR LUMBAR SPINE AP AND LATERAL     CLINICAL HISTORY:  Low back pain, unspecified     TECHNIQUE:  AP, lateral and spot images were performed of the lumbar spine.     COMPARISON:  None     FINDINGS:  There is lumbar scoliosis convex to the left.  The scoliotic angle measures 11° from the superior endplate of T12 to the inferior  endplate of L4.  There is degenerative facet arthrosis at the L4-5 and L5-S1 levels.  The facet arthrosis results in 6 mm anterior subluxation of L4.  Vertebral body heights are well maintained.  There are findings of degenerative disc disease at the T12-L1, L1-2, L2-3, and L5-S1 levels with disc narrowing.  The disc degeneration is most severe at L5-S1 where there is severe disc narrowing and vacuum disc phenomenon.  There is extensive aortic atherosclerosis.     Impression:     1. Lumbar scoliosis.  2. Multilevel degenerative disc disease most severe at the L5-S1 level.  3. L4-5 and L5-S1 degenerative facet arthrosis with mild anterior L4 subluxation.     Electronically signed by:Phani Griggs MD  Date:                                            2022  Time:                                           10:10       Past Medical History:  Past Medical History:   Diagnosis Date    Depression      Past Surgical History:   Procedure Laterality Date    APPENDECTOMY      HERNIA REPAIR      SINUS SURGERY      TONSILLECTOMY       Review of patient's allergies indicates:   Allergen Reactions    Celestone [betamethasone sodium phosphate] Itching     Flushing to face    Tessalon [benzonatate] Other (See Comments)    Gabapentin     Zoloft [sertraline]      Made her feel strange    Amoxicillin Anxiety     Headaches      Social History     Tobacco Use    Smoking status: Former     Current packs/day: 0.00     Types: Cigarettes     Quit date: 11/15/1985     Years since quittin.1    Smokeless tobacco: Never   Substance Use Topics    Alcohol use: No     Comment: rare     Family History   Problem Relation Name Age of Onset    Melanoma Neg Hx      Psoriasis Neg Hx      Lupus Neg Hx      Eczema Neg Hx       Current Outpatient Medications on File Prior to Visit   Medication Sig Dispense Refill    ALPRAZolam (XANAX) 0.25 MG tablet Take 1 tablet (0.25 mg total) by mouth daily as needed for Anxiety (take 1/2 to 1 tablet as  needed). 30 tablet 0    clotrimazole-betamethasone 1-0.05% (LOTRISONE) cream Apply topically 2 (two) times daily. 60 g 0    diclofenac (VOLTAREN) 75 MG EC tablet Take 1 tablet (75 mg total) by mouth 2 (two) times daily as needed (pain). 60 tablet 0    doxycycline (VIBRAMYCIN) 100 MG Cap Take 1 capsule (100 mg total) by mouth every 12 (twelve) hours. 14 capsule 0    fluticasone propionate (FLONASE) 50 mcg/actuation nasal spray Use 2 puffs in each nostril q day. 16 g 12    hydrocortisone 2.5 % cream Apply topically 2 (two) times daily. 28 g 5    loratadine (CLARITIN) 10 mg tablet Take 1 tablet (10 mg total) by mouth once daily. 90 tablet 4    multivit with minerals/lutein (MULTIVITAMIN 50 PLUS ORAL) Take 1 tablet by mouth.      omeprazole (PRILOSEC) 40 MG capsule Take 1 capsule (40 mg total) by mouth once daily. 90 capsule 3    sod chlor-bicarb-squeez bottle (NEILMED SINUS RINSE COMPLETE) pkdv 1 Units by sinus irrigation route 2 (two) times a day. Not right before bed 1 each 11    vit A/vit C/vit E/zinc/copper (PRESERVISION AREDS ORAL) Take 1 tablet by mouth once daily.      vitamin D (VITAMIN D3) 1000 units Tab Take 1,000 Units by mouth once daily.      zinc gluconate 50 mg tablet Take 50 mg by mouth once daily.       No current facility-administered medications on file prior to visit.     Review of Systems   Constitutional:  Negative for appetite change, chills, fatigue and fever.   HENT:  Negative for congestion, rhinorrhea and sore throat.    Eyes:  Negative for visual disturbance.   Respiratory:  Negative for cough and shortness of breath.    Cardiovascular:  Negative for chest pain, palpitations and leg swelling.   Gastrointestinal:  Negative for abdominal pain, diarrhea and vomiting.   Genitourinary:  Negative for difficulty urinating, dysuria and hematuria.   Musculoskeletal:  Positive for arthralgias, back pain and gait problem. Negative for myalgias.   Skin:  Negative for rash and wound.   Neurological:   "Positive for weakness and numbness. Negative for dizziness and headaches.   Psychiatric/Behavioral:  Negative for behavioral problems. The patient is not nervous/anxious.      Vitals:    12/18/24 1002   BP: 136/74   Pulse: 88   Resp: 16   Temp: 98.3 °F (36.8 °C)   TempSrc: Oral   SpO2: 98%   Weight: 72.3 kg (159 lb 6.4 oz)   Height: 5' 6" (1.676 m)     Wt Readings from Last 3 Encounters:   12/18/24 72.3 kg (159 lb 6.4 oz)   12/02/24 74.8 kg (165 lb)   09/30/24 74.6 kg (164 lb 6.4 oz)     Physical Exam  Vitals reviewed.   Constitutional:       General: She is not in acute distress.     Appearance: Normal appearance. She is not ill-appearing.      Comments: Here with her granddaughter; patient appears uncomfortable. Sitting on rollator, frequently having to stand up for comfort    HENT:      Head: Normocephalic and atraumatic.      Right Ear: External ear normal.      Left Ear: External ear normal.      Nose: Nose normal.   Eyes:      Extraocular Movements: Extraocular movements intact.      Conjunctiva/sclera: Conjunctivae normal.   Cardiovascular:      Rate and Rhythm: Normal rate.      Heart sounds: Normal heart sounds.   Pulmonary:      Effort: Pulmonary effort is normal. No respiratory distress.      Breath sounds: Normal breath sounds.   Abdominal:      General: Abdomen is flat. There is no distension.   Musculoskeletal:      Cervical back: Normal range of motion.      Lumbar back: Tenderness present. No bony tenderness. Normal range of motion.   Skin:     General: Skin is warm and dry.      Capillary Refill: Capillary refill takes less than 2 seconds.      Coloration: Skin is not pale.      Findings: No rash.   Neurological:      General: No focal deficit present.      Mental Status: She is alert and oriented to person, place, and time. Mental status is at baseline.      Sensory: Sensation is intact. No sensory deficit.      Motor: Weakness (generalized) present. No tremor or seizure activity.      " Coordination: Coordination is intact. Coordination normal.      Gait: Gait abnormal (using rollator).   Psychiatric:         Mood and Affect: Mood normal.         Speech: Speech normal. Speech is not rapid and pressured, delayed or slurred.         Behavior: Behavior normal. Behavior is not agitated, slowed, aggressive, withdrawn, hyperactive or combative. Behavior is cooperative.         Thought Content: Thought content normal.         Judgment: Judgment normal.       Health Maintenance   Topic Date Due    RSV Vaccine (Age 60+ and Pregnant patients) (1 - 1-dose 75+ series) Never done    TETANUS VACCINE  03/08/2025 (Originally 10/15/1953)    Hemoglobin A1c (Prediabetes)  03/11/2025    Lipid Panel  03/11/2029    Influenza Vaccine  Completed    Pneumococcal Vaccines (Age 65+)  Completed    Shingles Vaccine  Discontinued    COVID-19 Vaccine  Discontinued     This note was generated with the assistance of ambient listening technology. Verbal consent was obtained by the patient and accompanying visitor(s) for the recording of patient appointment to facilitate this note. I attest to having reviewed and edited the generated note for accuracy, though some syntax or spelling errors may persist. Please contact the author of this note for any clarification.    Visit today included increased complexity associated with the care of the episodic problem - see above- addressed and managing the longitudinal care of the patient due to the serious and/or complex managed problem(s) - see above.

## 2024-12-19 ENCOUNTER — CLINICAL SUPPORT (OUTPATIENT)
Dept: REHABILITATION | Facility: HOSPITAL | Age: 89
End: 2024-12-19
Attending: GENERAL ACUTE CARE HOSPITAL
Payer: MEDICARE

## 2024-12-19 DIAGNOSIS — R29.898 WEAKNESS OF LEFT HIP: ICD-10-CM

## 2024-12-19 DIAGNOSIS — R26.9 GAIT DIFFICULTY: Primary | ICD-10-CM

## 2024-12-19 DIAGNOSIS — M25.552 LEFT HIP PAIN: ICD-10-CM

## 2024-12-19 PROCEDURE — 97110 THERAPEUTIC EXERCISES: CPT | Mod: PN | Performed by: GENERAL ACUTE CARE HOSPITAL

## 2024-12-19 PROCEDURE — 20561 NDL INSJ W/O NJX 3+ MUSC: CPT | Mod: PN | Performed by: GENERAL ACUTE CARE HOSPITAL

## 2024-12-19 PROCEDURE — 97140 MANUAL THERAPY 1/> REGIONS: CPT | Mod: PN,CG | Performed by: GENERAL ACUTE CARE HOSPITAL

## 2024-12-19 NOTE — PROGRESS NOTES
[facility]   Physical Therapy Treatment Note    Name: Peg Moreno  Clinic Number: [id number]    Therapy Diagnosis:   Encounter Diagnoses   Name Primary?    Gait difficulty Yes    Left hip pain     Weakness of left hip      Physician: Radha Dunn NP  Physician Orders: Gait Training  Medical Diagnosis from Referral: acute left-sided low back pain with left-sided sciatica   Evaluation Date: 12/11/2024  Authorization Period Expiration: 12/31/24  Plan of Care Expiration: 2/19/25 (10 weeks)  Progress Note Due: 1/11/25  Visit # / Visits authorized: 2/6 (1/ 1 Evaluation)  FOTO: 1/3 - Lumbar  N/A /100 (to be performed at 1st visit)    Visit Date: 12/19/2024  Time In: 1430  Time Out: 1530  Total Appointment Time: 60 minutes  PTA Visit #: 0/5     Treatment No Show #:  Subjective   Pt reports: Patient returns to outpatient physical therapy reporting a reduction in pain immediately following dry needling as well as prone lying at first treatment session. Overall patient states the pain remains severe 9 to 10 out of 10 pain daily. She does have a standing X-ray and MRI scheduled for this weekend.    She was not compliant with home exercise program.   Response to previous treatment: 1st treatment today  Functional change: 1st treatment today    Pain: 9/10  Location: bilateral deep glutes      Objective      Objective Measures updated at progress report unless specified.     Treatment   Shahid received following skilled interventions listed below:      CPT Interventions & Parameters  Date: 12/12/24   MT  +  DN 3+  +  UES Dry Needling: Patient prone lying  Glute Max (janine) 60mm x2  Glute Med (janine) 60mm x2  Piriformis (janine) 60 mm x2  With electrical stimulation x 7 minutes   Palpation and soft tissue assessment required to locate trigger points and correct needle placement and skilled insertion was performed to apply the needles   TE Patient family education / discussion with Son: August                      PLAN        Timed CPT Codes available for Billin(13)minutes of Therapeutic Exercise 14516 (TE) to develop: strength, endurance, ROM, flexibility, posture, and core stabilization  ()minutes of Neuromuscular Re-education 87456 (NMR) activities to improve: Balance, Coordination, Kinesthetic, Sense, Proprioception, and Posture   ( ) minutes of Therapeutic Activities 07067 (TA) to improve: functional performance, impact activities of daily living  2(25) minutes of Manual Therapy 66207 (MT) techniques to improve: Joint mobilizations, Manual traction, Myofacial release, Soft tissue Mobilization, and Friction Massage   ( ) minutes of Gait Training 41781(GT) to improve: functional mobility, gait mechanics, safety with locomotion        *A portion of this treatment session is provided with the assistance of a skilled rehabilitation technician under the supervision of a licensed physical therapist.   *Billing accurately reflects 1:1 treatment time per patient's insurance guidelines.      Untimed CPT Codes available for Billing:   ( ) Dry Needling 30503 (DN 1-2) to impact muscular pain, tightness, trigger points, and tissue pliability  (X)Dry Needling 61505 (DN 3+) to impact muscular pain, tightness, trigger points, and tissue pliability  (X)Unattended Electrical Stimulation 91899 (UES) for muscle performance or pain modulation  ( ) Heat/Cold Modalities 69176 (H/C) to impact pain   ( ) Mechanical Traction 00299 (T) to impact pain, tissue pliability, range of motion    Patient Education and Home Exercises     Home Exercises Provided and Patient Education Provided   Patient was educated on the role of PT, POC, treatment plan, discharge goals, HEP.  Patient educated on biomechanical justification for therapeutic exercise and importance of compliance with HEP in order to improve overall impairments and QOL   Patient was educated on all the above exercise prior/during/after for proper posture, positioning,  and execution for safe performance with home exercise program.       Written Home Exercises Provided:   Patient instructed to cont prior HEP.   Exercises were reviewed and Shahid was able to demonstrate them prior to the end of the session.    Shahid demonstrated fair  understanding of the education provided.   See EMR under Patient Instructions for exercises provided during therapy sessions    Assessment   Treatment session focuses on education related to postural correction prone lying for achievement of lumbar and neutral spine and dry needling with electrical stimulation to impact gluteal muscles and external hip rotators. Dry needling continues to be a positive intervention to reduce pain improved posture as well as standing tolerance immediately. Long discussion is had with son August who has POA regarding indications for upcoming tests as well as relationship of pain brightness impossible nerve compression in regards to lack of appetite Constipation and decreased sleep.    Shahid Is not progressing well towards her goals.   Pt prognosis is Fair.   Pt will continue to benefit from skilled outpatient physical therapy to address the deficits listed in the problem list box on initial evaluation, provide pt/family education and to maximize pt's level of independence in the home and community environment.   Pt's spiritual, cultural and educational needs considered and pt agreeable to plan of care and goals.  Anticipated barriers to physical therapy: coping style, age, transportation, pain management     Goals:  Short Term Goals Status Est. Met   Patient to be independent with foundational home exercise program performance to impact knowledge of condition  [] Met  [] Not Met  [] Progressing 12/11/2024     Patient to complete Chair Rise Test and TUG testing to assess safety with mobility [] Met  [] Not Met  [] Progressing 12/11/2024     Patient to display ability to appropriately contract transvere abdominus and  perform posterior pelvic tilt to impact posture and lumbar position [] Met  [] Not Met  [] Progressing 12/11/2024     Patient to improve lumbar FOTO to  --  /100 to display improved activity participation [] Met  [] Not Met  [] Progressing 12/11/2024        Long Term Goal Status Est. Met   Patient will display independent and correct performance of advanced home exercise program without cueing to impact knowledge of condition [] Met  [] Not Met  [] Progressing 12/11/2024     Patient to ambulate household distances without use of AD to impact mobility and return to PLOF [] Met  [] Not Met  [] Progressing 12/11/2024     Patient to improve scores on TUG and Chair Rise Test to decrease falls risk and display improve strength and safety with mobility  [] Met  [] Not Met  [] Progressing 12/11/2024     Patient to improve lumbar  FOTO to -- /100 to display improved activity participation [] Met  [] Not Met  [] Progressing 12/11/2024     Patient will report confidence in managing condition upon discharge from Physical Therapy. [] Met  [] Not Met  [] Progressing 12/11/2024        Plan   Plan of care Certification: 12/11/2024 to 2/19/25.     Outpatient Physical Therapy 1-2 times weekly for 10 weeks to include the following interventions: Cervical/Lumbar Traction, Gait Training, Manual Therapy, Moist Heat/ Ice, Neuromuscular Re-ed, Orthotic Management and Training, Paraffin, Patient Education, Self Care, Therapeutic Activities, and Therapeutic Exercise , Electrical Stimulation (IFC, Russian), IASTM, STM, Cupping, Blood Flow Restriction as appropriate.      Karin Carrizales PT, DPT, SCS, CSCS  Board Certified Sports Clinical Specialist   Certified Dry Needling Provider  12/19/2024      Disclaimer: This note was prepared using a voice recognition system and is likely to have sound alike errors within the text.

## 2024-12-20 ENCOUNTER — HOSPITAL ENCOUNTER (OUTPATIENT)
Dept: RADIOLOGY | Facility: HOSPITAL | Age: 89
Discharge: HOME | End: 2024-12-20
Attending: NURSE PRACTITIONER
Payer: MEDICARE

## 2024-12-20 DIAGNOSIS — M54.16 LUMBAR RADICULOPATHY, CHRONIC: ICD-10-CM

## 2024-12-20 PROCEDURE — 72100 X-RAY EXAM L-S SPINE 2/3 VWS: CPT | Mod: TC,PO

## 2024-12-20 PROCEDURE — 72100 X-RAY EXAM L-S SPINE 2/3 VWS: CPT | Mod: 26,,, | Performed by: RADIOLOGY

## 2024-12-21 ENCOUNTER — HOSPITAL ENCOUNTER (OUTPATIENT)
Dept: RADIOLOGY | Facility: HOSPITAL | Age: 89
Discharge: HOME | End: 2024-12-21
Attending: NURSE PRACTITIONER
Payer: MEDICARE

## 2024-12-21 DIAGNOSIS — M54.16 LUMBAR RADICULOPATHY, CHRONIC: ICD-10-CM

## 2024-12-21 PROCEDURE — 72148 MRI LUMBAR SPINE W/O DYE: CPT | Mod: 26,,, | Performed by: RADIOLOGY

## 2024-12-21 PROCEDURE — 72148 MRI LUMBAR SPINE W/O DYE: CPT | Mod: TC,PO

## 2024-12-23 ENCOUNTER — PATIENT MESSAGE (OUTPATIENT)
Dept: FAMILY MEDICINE | Facility: CLINIC | Age: 89
End: 2024-12-23
Payer: MEDICARE

## 2024-12-23 ENCOUNTER — TELEPHONE (OUTPATIENT)
Dept: PAIN MEDICINE | Facility: CLINIC | Age: 89
End: 2024-12-23
Payer: MEDICARE

## 2024-12-23 ENCOUNTER — TELEPHONE (OUTPATIENT)
Dept: FAMILY MEDICINE | Facility: CLINIC | Age: 89
End: 2024-12-23
Payer: MEDICARE

## 2024-12-23 DIAGNOSIS — R93.89 ABNORMAL MRI: Primary | ICD-10-CM

## 2024-12-23 NOTE — TELEPHONE ENCOUNTER
Staff tried reaching patient to inform her that Dr. Luong doesn't have any available appointments this week. Staff left a voicemail explaining that.

## 2024-12-23 NOTE — TELEPHONE ENCOUNTER
----- Message from Jose sent at 12/23/2024  9:39 AM CST -----  Regarding: MRI Results   MARISELA MORENO GEENA MRN: [id number] son would like for Dr Lam to give him a call concerning his mom MRI results.     August Moreno  Son  [phone]

## 2024-12-24 ENCOUNTER — OFFICE VISIT (OUTPATIENT)
Dept: PAIN MEDICINE | Facility: CLINIC | Age: 89
End: 2024-12-24
Payer: MEDICARE

## 2024-12-24 VITALS — HEIGHT: 66 IN | BODY MASS INDEX: 25.55 KG/M2 | WEIGHT: 159 LBS

## 2024-12-24 DIAGNOSIS — M54.16 LUMBAR RADICULOPATHY, ACUTE: Primary | ICD-10-CM

## 2024-12-24 PROCEDURE — 99204 OFFICE O/P NEW MOD 45 MIN: CPT | Mod: S$PBB,,, | Performed by: STUDENT IN AN ORGANIZED HEALTH CARE EDUCATION/TRAINING PROGRAM

## 2024-12-24 PROCEDURE — 99213 OFFICE O/P EST LOW 20 MIN: CPT | Mod: PBBFAC,PN | Performed by: STUDENT IN AN ORGANIZED HEALTH CARE EDUCATION/TRAINING PROGRAM

## 2024-12-24 PROCEDURE — 99999 PR PBB SHADOW E&M-EST. PATIENT-LVL III: CPT | Mod: PBBFAC,,, | Performed by: STUDENT IN AN ORGANIZED HEALTH CARE EDUCATION/TRAINING PROGRAM

## 2024-12-24 RX ORDER — HYDROCODONE BITARTRATE AND ACETAMINOPHEN 5; 325 MG/1; MG/1
1 TABLET ORAL
Qty: 25 TABLET | Refills: 0 | Status: SHIPPED | OUTPATIENT
Start: 2024-12-24 | End: 2025-01-18

## 2024-12-24 NOTE — PROGRESS NOTES
[facility] - Department    August Lam MD      First Office Visit: 12/24/24  Today' Date: 12/24/2024  Last Office Visit: None    Chief complaint: back pain    HPI: Pt is a pleasant 89 y.o., who presents for evaluation. Referred by Radha Dunn NP. Pt complains of back pain and L leg pain since fall early Nov. Endorses pain that is in the low back and buttocks. Also endorses having sharp, shooting pain going down the back of L leg to the bottom of the foot. States the sharp, shooting pain started after the fall. States they have an appt with orthopedic surgery coming up. No BB changes. Is doing PT and HEP        Pain disability index score: 70  Pain score: 10    Relevant Imaging/ Testing:   MR L-spine 12/24  XR L-spine 12/24  XR L hip w/ pelvis 11/24    Procedures: None    Date of board of pharmacy review:12/24/2024  Date of opioid risk screening/ pain psych: None  Date of opioid agreement and consent: None  Date of urine drug screen: None  Date of random pill count: None     was reviewed today: reviewed, no concerns     Prescribed medications: None    See EHR for  PMH, PSH, FH, SH, Medications and Allergy    ROS:  Positive for pain  ROS     PE:  There were no vitals filed for this visit.  General: Pleasant, no distress  HEENT: NC/ AT. PERRLA  CV: Radial pulses intact  Pulm: No distress  Ext: No edema    Physical Exam     Neuromusculoskeletal:  Head: NC, AT. PERRLA  Neck: Intact range of motions  Shoulder: Intact range of motion  Lumbar: Intact range of motion. Bilat Facet loading. Min Tenderness. Neg SL. No pain with flexion. No pain with extension.   Hip: Intact range of motion  SI: Level, Min tenderness  Knee: Intact range of motion  Reflexes: normal Knee  Strength: 5/5 globally   Sensory: Grossly intact   Skin: No bruising, erythema  Gait: Normal      Impression:  Back pain  L leg pain   Relevant History  BMI 25.66  Anxiety      Assessment:  Lumbar radiculopathy  Sacral insufficiency fracture      Plan:  Discussed options  Imaging/ relevant records viewed/ reviewed/ discussed  Imaging results viewed and reviewed (noted above)/ reviewed with patient   reviewed  Cont HEP  Cont PT   Agree with ortho eval  [place] 5 rx #25  Re-eval after  Consider medrol dose pack  L TFESI L5-S1 and S1 if pain persists      Prescribed medications:  1. Norco    The impression and plan were discussed and explained in detail. All the questions were answered. Education was provided accordingly.     The appropriate use of the medications, including storage, risks (including addiction) and potential sides effects were explained and discussed.     Follow-up:  4 wks or sooner if needed    Samina Amin MD

## 2024-12-26 ENCOUNTER — TELEPHONE (OUTPATIENT)
Dept: PAIN MEDICINE | Facility: CLINIC | Age: 89
End: 2024-12-26
Payer: MEDICARE

## 2024-12-26 NOTE — TELEPHONE ENCOUNTER
Staff contacted patient and informed patient no earlier appointment were available. Patient is already on a waitlist

## 2024-12-26 NOTE — TELEPHONE ENCOUNTER
----- Message from Froy sent at 12/23/2024  4:50 PM CST -----  Contact: [facility]  .Type:  Sooner Apoointment Request    Caller is requesting a sooner appointment.  Caller declined first available appointment listed below.  Caller will not accept being placed on the waitlist and is requesting a message be sent to doctor.  Name of Caller:pt  When is the first available appointment?01/15/2025  Symptoms:L4 and L5 fracture.  Would the patient rather a call back or a response via [facility]?  Call back  Best Call Back Number: [phone]  Additional Information: 12/23/2024 - 1/1/2025

## 2025-01-15 PROBLEM — R29.898 WEAKNESS OF LEFT HIP: Status: RESOLVED | Noted: 2021-06-17 | Resolved: 2025-01-15

## 2025-01-15 PROBLEM — M25.552 LEFT HIP PAIN: Status: RESOLVED | Noted: 2021-06-17 | Resolved: 2025-01-15

## 2025-01-15 PROBLEM — Z78.9 IMPAIRED MOBILITY AND ACTIVITIES OF DAILY LIVING: Status: RESOLVED | Noted: 2024-12-11 | Resolved: 2025-01-15

## 2025-01-15 PROBLEM — Z74.09 IMPAIRED MOBILITY AND ACTIVITIES OF DAILY LIVING: Status: RESOLVED | Noted: 2024-12-11 | Resolved: 2025-01-15

## 2025-01-15 PROBLEM — R26.9 GAIT DIFFICULTY: Status: RESOLVED | Noted: 2021-06-17 | Resolved: 2025-01-15

## 2025-02-14 ENCOUNTER — PATIENT OUTREACH (OUTPATIENT)
Dept: ADMINISTRATIVE | Facility: HOSPITAL | Age: OVER 89
End: 2025-02-14
Payer: MEDICARE

## 2025-02-24 DIAGNOSIS — Z00.00 ENCOUNTER FOR MEDICARE ANNUAL WELLNESS EXAM: ICD-10-CM

## 2025-05-07 ENCOUNTER — OFFICE VISIT (OUTPATIENT)
Dept: FAMILY MEDICINE | Facility: CLINIC | Age: OVER 89
End: 2025-05-07
Payer: MEDICARE

## 2025-05-07 ENCOUNTER — PATIENT MESSAGE (OUTPATIENT)
Dept: FAMILY MEDICINE | Facility: CLINIC | Age: OVER 89
End: 2025-05-07

## 2025-05-07 VITALS
OXYGEN SATURATION: 97 % | BODY MASS INDEX: 24.61 KG/M2 | DIASTOLIC BLOOD PRESSURE: 64 MMHG | SYSTOLIC BLOOD PRESSURE: 136 MMHG | HEIGHT: 66 IN | WEIGHT: 153.13 LBS | HEART RATE: 64 BPM

## 2025-05-07 DIAGNOSIS — Z79.899 OTHER LONG TERM (CURRENT) DRUG THERAPY: ICD-10-CM

## 2025-05-07 DIAGNOSIS — N39.41 URGE INCONTINENCE: ICD-10-CM

## 2025-05-07 DIAGNOSIS — K21.00 GASTROESOPHAGEAL REFLUX DISEASE WITH ESOPHAGITIS WITHOUT HEMORRHAGE: ICD-10-CM

## 2025-05-07 DIAGNOSIS — G62.9 NEUROPATHY: ICD-10-CM

## 2025-05-07 DIAGNOSIS — E78.5 HYPERLIPIDEMIA, UNSPECIFIED HYPERLIPIDEMIA TYPE: ICD-10-CM

## 2025-05-07 DIAGNOSIS — L85.3 DRY SKIN: Primary | ICD-10-CM

## 2025-05-07 DIAGNOSIS — M85.80 OSTEOPENIA, UNSPECIFIED LOCATION: ICD-10-CM

## 2025-05-07 DIAGNOSIS — Z78.0 ASYMPTOMATIC POSTMENOPAUSAL STATE: ICD-10-CM

## 2025-05-07 PROCEDURE — 99214 OFFICE O/P EST MOD 30 MIN: CPT | Mod: PBBFAC,PN | Performed by: INTERNAL MEDICINE

## 2025-05-07 PROCEDURE — 99999 PR PBB SHADOW E&M-EST. PATIENT-LVL IV: CPT | Mod: PBBFAC,,, | Performed by: INTERNAL MEDICINE

## 2025-05-07 RX ORDER — OMEPRAZOLE 40 MG/1
40 CAPSULE, DELAYED RELEASE ORAL DAILY
Qty: 90 CAPSULE | Refills: 3 | Status: SHIPPED | OUTPATIENT
Start: 2025-05-07 | End: 2026-05-07

## 2025-05-07 RX ORDER — OXYBUTYNIN CHLORIDE 5 MG/1
5 TABLET, EXTENDED RELEASE ORAL DAILY
Qty: 30 TABLET | Refills: 1 | Status: SHIPPED | OUTPATIENT
Start: 2025-05-07 | End: 2026-05-07

## 2025-05-07 NOTE — PROGRESS NOTES
Assessment and Plan:    1. Gastroesophageal reflux disease with esophagitis without hemorrhage  Continue PPI.  - omeprazole (PRILOSEC) 40 MG capsule; Take 1 capsule (40 mg total) by mouth once daily.  Dispense: 90 capsule; Refill: 3    2. Dry skin (Primary)  Discussed measures for dry skin as in patient instructions.  - TSH; Future    3. Hyperlipidemia, unspecified hyperlipidemia type  Declines statin that she has been prior years.  - Comprehensive Metabolic Panel; Future  - Lipid Panel; Future    4. Neuropathy  - Vitamin B12; Future  - TSH; Future    5. Urge incontinence  Trial of oxybutynin.  Unclear if she ever took this when it was prescribed in 2023.  - oxybutynin (DITROPAN-XL) 5 MG TR24; Take 1 tablet (5 mg total) by mouth once daily.  Dispense: 30 tablet; Refill: 1    6. Osteopenia, unspecified location  - DXA Bone Density Axial Skeleton 1 or more sites; Future    7. Asymptomatic postmenopausal state  - DXA Bone Density Axial Skeleton 1 or more sites; Future    8. Other long term (current) drug therapy  - Vitamin B12; Future  - TSH; Future    ______________________________________________________________________  Subjective:    Chief Complaint:  Re-establish care    HPI:  Peg is a 89 y.o. year old woman here to re-establish care. She had last seen me 2 years ago.      Osteopenia- She had a sacral fracture in Nov 2024. Not on medications in the past for osteopenia. Last DEXA 2020. She is not currently taking calcium or vitamin D.    She is bothered by hair loss and dry skin.    She has been on omeprazole since June 2024 for acid reflux. It does help. Still has occasional symptoms.    She had been seen for lumbar back pain with sciatica. Had done PT late last year. Finished in February.  She currently denies having any back pain.      She reports that she has been having tingling in bilateral feet for a while.  She has been using magnesium spray and notes that this has been helpful.  Recently started  having some amount of tingling in her fingers as well.  She is concerned about B12 deficiency in the setting of using PPI.     Aortic atherosclerosis- Noted on prior imaging including US abdomen. Has declined statin.     She takes advil rarely for arthritis pain. Uses aspercreme topically and this is helpful.     Past Medical History:  Past Medical History:   Diagnosis Date    Depression        Past Surgical History:  Past Surgical History:   Procedure Laterality Date    APPENDECTOMY      HERNIA REPAIR      SINUS SURGERY      TONSILLECTOMY         Family History:  Family History   Problem Relation Name Age of Onset    Melanoma Neg Hx      Psoriasis Neg Hx      Lupus Neg Hx      Eczema Neg Hx         Social History:  Social History     Socioeconomic History    Marital status:    Tobacco Use    Smoking status: Former     Current packs/day: 0.00     Types: Cigarettes     Quit date: 11/15/1985     Years since quittin.5    Smokeless tobacco: Never   Substance and Sexual Activity    Alcohol use: No     Comment: rare       Medications:  Medications Ordered Prior to Encounter[1]    Allergies:  Celestone [betamethasone sodium phosphate], Tessalon [benzonatate], Gabapentin, Zoloft [sertraline], and Amoxicillin    Immunizations:  Immunization History   Administered Date(s) Administered    COVID-19, MRNA, LN-S, PF (MODERNA FULL 0.5 ML DOSE) 2021, 2021    Influenza (FLUAD) - Quadrivalent - Adjuvanted - PF *Preferred* (65+) 10/25/2021, 10/18/2023    Influenza - Quadrivalent - High Dose - PF (65 years and older) 10/20/2022    Influenza - Trivalent - Fluad - Adjuvanted - PF (65 years and older 10/17/2024    Influenza - Trivalent - Fluzone High Dose - PF (65 years and older) 10/31/2014, 10/15/2015, 10/01/2016, 2017, 10/30/2018, 10/25/2019    Pneumococcal Conjugate - 13 Valent 2018    Pneumococcal Polysaccharide - 23 Valent 2006       Review of Systems:  Review of Systems   Constitutional:   "Negative for chills and fever.   Respiratory:  Negative for shortness of breath.    Cardiovascular:  Negative for chest pain, palpitations and leg swelling.   Neurological:  Negative for dizziness, syncope and light-headedness.       Objective:    Vitals:  Vitals:    05/07/25 1028   BP: 136/64   Pulse: 64   SpO2: 97%   Weight: 69.4 kg (153 lb 1.8 oz)   Height: 5' 6" (1.676 m)   PainSc: 0-No pain       Physical Exam  Vitals reviewed.   Constitutional:       General: She is not in acute distress.     Appearance: She is well-developed.   Eyes:      General: No scleral icterus.  Cardiovascular:      Rate and Rhythm: Normal rate and regular rhythm.      Heart sounds: No murmur heard.  Pulmonary:      Effort: Pulmonary effort is normal. No respiratory distress.      Breath sounds: Normal breath sounds. No wheezing, rhonchi or rales.   Musculoskeletal:      Right lower leg: No edema.      Left lower leg: No edema.   Skin:     General: Skin is warm and dry.   Neurological:      Mental Status: She is alert and oriented to person, place, and time.   Psychiatric:         Behavior: Behavior normal.         Body mass index is 24.71 kg/m².      Data:  Prior TSH normal    Earnestine Hernandez MD  Internal Medicine           [1]   Current Outpatient Medications on File Prior to Visit   Medication Sig Dispense Refill    ALPRAZolam (XANAX) 0.25 MG tablet Take 1 tablet (0.25 mg total) by mouth daily as needed for Anxiety (take 1/2 to 1 tablet as needed). 30 tablet 0    clotrimazole-betamethasone 1-0.05% (LOTRISONE) cream Apply topically 2 (two) times daily. 60 g 0    hydrocortisone 2.5 % cream Apply topically 2 (two) times daily. 28 g 5    multivit with minerals/lutein (MULTIVITAMIN 50 PLUS ORAL) Take 1 tablet by mouth.      omeprazole (PRILOSEC) 40 MG capsule Take 1 capsule (40 mg total) by mouth once daily. 90 capsule 3    sod chlor-bicarb-squeez bottle (NEILMED SINUS RINSE COMPLETE) pkdv 1 Units by sinus irrigation route 2 (two) times a " day. Not right before bed 1 each 11    vit A/vit C/vit E/zinc/copper (PRESERVISION AREDS ORAL) Take 1 tablet by mouth once daily.      vitamin D (VITAMIN D3) 1000 units Tab Take 1,000 Units by mouth once daily.      diclofenac (VOLTAREN) 75 MG EC tablet Take 1 tablet (75 mg total) by mouth 2 (two) times daily as needed (pain). 60 tablet 0    doxycycline (VIBRAMYCIN) 100 MG Cap Take 1 capsule (100 mg total) by mouth every 12 (twelve) hours. 14 capsule 0    fluticasone propionate (FLONASE) 50 mcg/actuation nasal spray Use 2 puffs in each nostril q day. 16 g 12    loratadine (CLARITIN) 10 mg tablet Take 1 tablet (10 mg total) by mouth once daily. 90 tablet 4    zinc gluconate 50 mg tablet Take 50 mg by mouth once daily. (Patient not taking: Reported on 5/7/2025)       No current facility-administered medications on file prior to visit.

## 2025-05-07 NOTE — PATIENT INSTRUCTIONS
If you want to try magnesium, try magnesium glycinate      Tips for Dry or Itchy Skin:     During a Bath or Shower:  - Do not soak in the shower or bathtub for more than 10 minutes.  - Do not vigorously scrub your skin with a washcloth, sponge, or brush.  - Use lukewarm water when bathing, do not use really hot water.  - Use very little soap, and only use it in areas where it is needed (underarms, groin, and under breasts). Do not use soap on the arms, legs, or back unless they are very dirty.  - Use a mild soap without dyes or perfumes. Recommended brands include Cetaphil, Cerave, or Dove unscented.  - Do not use antibacterial soaps, body washes, liquid soaps, shower gels, or bubble baths     After a Bath or Shower:  - After bathing, gently pat your skin dry. Do not rub your skin with a towel.  - Apply moisturizer immediately after getting out of the bath or shower to help lock in moisture.     Use Moisturizers Often:  - You should use moisturizers at least twice a day (including after your bath or shower)  - There is no reason why it would be harmful to use moisturizer more than twice a day  - Creams are much better than lotion for dry skin  - Buy moisturizers that are packaged in tubs that you scoop out with your hands. Anything with a pump is probably too thin for someone with dry skin.  - Recommended brands include Cerave, Cetaphil, Vanicream, and Aveeno. Look for brands that do not contain any perfumes or dyes.  - Cerave makes a cream with pramoxine that specifically helps with itchy skin.  - Heavier ointments such as Vaseline and Aquaphor can work very well for people with very dry skin but some people may find these too greasy. If you use these in areas covered by clothes at night, this can be less bothersome. If you use these on your hands at night, try putting white cotton gloves on your hands to sleep.     Other Products:  - Do not use scented products such as cologne, perfume, or scented lotions on your  skin.  - Avoid products that contain alcohol, fragrance, retinoids, or alpha-hydroxy acids.  - For laundry, pick products that do not contain dyes or perfumes (All Free and Clear, Dreft, Tide-Free)  - Do not use dryer sheets or fabric softener     Medications:  - For very itchy skin, you can try using an antihistamine pill  - Benadryl (diphenhydramine) is available over the counter, but this can be very sedating (use only at night)  - Non-drowsy antihistamines available over the counter include Zyrtec (cetirizine), Allegra (fexofenadine) and Claritin (loratadine)  - If you have been prescribed a prescription anti-histamine or other prescription itch medication, use these only as prescribed

## 2025-05-12 ENCOUNTER — HOSPITAL ENCOUNTER (OUTPATIENT)
Dept: RADIOLOGY | Facility: HOSPITAL | Age: OVER 89
Discharge: HOME | End: 2025-05-12
Attending: INTERNAL MEDICINE
Payer: MEDICARE

## 2025-05-12 DIAGNOSIS — M85.80 OSTEOPENIA, UNSPECIFIED LOCATION: ICD-10-CM

## 2025-05-12 DIAGNOSIS — Z78.0 ASYMPTOMATIC POSTMENOPAUSAL STATE: ICD-10-CM

## 2025-05-12 PROCEDURE — 77092 TBS I&R FX RSK QHP: CPT | Mod: ,,, | Performed by: RADIOLOGY

## 2025-05-12 PROCEDURE — 77080 DXA BONE DENSITY AXIAL: CPT | Mod: 26,,, | Performed by: RADIOLOGY

## 2025-05-12 PROCEDURE — 77091 TBS TECHL CALCULATION ONLY: CPT | Mod: PO

## 2025-05-29 ENCOUNTER — RESULTS FOLLOW-UP (OUTPATIENT)
Dept: FAMILY MEDICINE | Facility: CLINIC | Age: OVER 89
End: 2025-05-29

## 2025-06-10 ENCOUNTER — RESULTS FOLLOW-UP (OUTPATIENT)
Dept: FAMILY MEDICINE | Facility: CLINIC | Age: OVER 89
End: 2025-06-10

## 2025-06-10 ENCOUNTER — OFFICE VISIT (OUTPATIENT)
Dept: FAMILY MEDICINE | Facility: CLINIC | Age: OVER 89
End: 2025-06-10
Payer: MEDICARE

## 2025-06-10 ENCOUNTER — LAB VISIT (OUTPATIENT)
Dept: LAB | Facility: HOSPITAL | Age: OVER 89
End: 2025-06-10
Attending: INTERNAL MEDICINE
Payer: MEDICARE

## 2025-06-10 VITALS
SYSTOLIC BLOOD PRESSURE: 112 MMHG | HEIGHT: 66 IN | DIASTOLIC BLOOD PRESSURE: 66 MMHG | BODY MASS INDEX: 24.25 KG/M2 | WEIGHT: 150.88 LBS | HEART RATE: 75 BPM | OXYGEN SATURATION: 95 %

## 2025-06-10 DIAGNOSIS — H53.9 VISION CHANGES: ICD-10-CM

## 2025-06-10 DIAGNOSIS — M81.0 AGE-RELATED OSTEOPOROSIS WITHOUT CURRENT PATHOLOGICAL FRACTURE: Primary | ICD-10-CM

## 2025-06-10 DIAGNOSIS — K64.9 HEMORRHOIDS, UNSPECIFIED HEMORRHOID TYPE: ICD-10-CM

## 2025-06-10 DIAGNOSIS — H34.8312 STABLE BRANCH RETINAL VEIN OCCLUSION OF RIGHT EYE: ICD-10-CM

## 2025-06-10 DIAGNOSIS — N39.41 URGE INCONTINENCE: ICD-10-CM

## 2025-06-10 DIAGNOSIS — M81.0 AGE-RELATED OSTEOPOROSIS WITHOUT CURRENT PATHOLOGICAL FRACTURE: ICD-10-CM

## 2025-06-10 LAB — 25(OH)D3+25(OH)D2 SERPL-MCNC: 62 NG/ML (ref 30–96)

## 2025-06-10 PROCEDURE — 99999 PR PBB SHADOW E&M-EST. PATIENT-LVL IV: CPT | Mod: PBBFAC,,, | Performed by: INTERNAL MEDICINE

## 2025-06-10 PROCEDURE — 36415 COLL VENOUS BLD VENIPUNCTURE: CPT | Mod: PN

## 2025-06-10 PROCEDURE — 82306 VITAMIN D 25 HYDROXY: CPT

## 2025-06-10 PROCEDURE — 99214 OFFICE O/P EST MOD 30 MIN: CPT | Mod: PBBFAC,PN | Performed by: INTERNAL MEDICINE

## 2025-06-10 NOTE — PROGRESS NOTES
Assessment and Plan:    1. Age-related osteoporosis without current pathological fracture (Primary)  Discussed diagnosis in detail.  We will check vitamin-D level to know how much vitamin-D two recommend.  If this is above 30, the plan is to start alendronate.  She is going to schedule an appointment for a routine exam with a dentist as she is having some left-sided upper molar pain.  We discussed that I would wait to start the alendronate until after she has had a dental exam to determine if there is any need for extractions or implants prior to starting alendronate.  - Vitamin D; Future    2. Urge incontinence  Reviewed that the side effects of blurred vision is reversible, dose dependent, and only affects about 4-10% of people who take oxybutynin.  She will consider this and decide whether or not she wants to try it.    3. Vision changes  - Ambulatory referral/consult to Optometry; Future  4. Stable branch retinal vein occlusion of right eye  Patient already has an ophthalmologist.  Would like to get a routine vision exam for glasses.  - Ambulatory referral/consult to Optometry; Future      ______________________________________________________________________  Subjective:    Chief Complaint:  Discuss osteoporosis    HPI:  Peg is a 89 y.o. year old female here to discuss osteoporosis on recent DEXA scan.      She has never previously been diagnosed with osteoporosis.  She has never taken medication for bone density.  She is not currently taking a vitamin-D supplement.  She has had a sacral insufficiency fracture within the last year that is healed.    With regard to oxybutynin, she reports that she never ended up taking this.  States that the pharmacist said this can cause blurred vision she did not want to risk this.  She reports that overall she does have blurred vision regularly.  She states she feels like she needs new glasses.  She does have an ophthalmologist and is getting injections every two months  "for history of stable retinal vein occlusion but has not had a regular vision exam or new glasses in several years.    Medications:  Medications Ordered Prior to Encounter[1]    Review of Systems:  Review of Systems   Constitutional:  Negative for chills and fever.   Eyes:  Positive for visual disturbance.   Respiratory:  Negative for shortness of breath and wheezing.    Cardiovascular:  Negative for chest pain and leg swelling.   Gastrointestinal:  Negative for diarrhea, nausea and vomiting.   Neurological:  Negative for dizziness, syncope and light-headedness.       Past Medical History:  Past Medical History:   Diagnosis Date    Depression        Objective:    Vitals:  Vitals:    06/10/25 1009   BP: 112/66   Pulse: 75   SpO2: 95%   Weight: 68.5 kg (150 lb 14.5 oz)   Height: 5' 6" (1.676 m)   PainSc:   3       Physical Exam  Vitals reviewed.   Constitutional:       General: She is not in acute distress.     Appearance: She is well-developed.   Eyes:      General:         Right eye: No discharge.         Left eye: No discharge.      Conjunctiva/sclera: Conjunctivae normal.   Cardiovascular:      Rate and Rhythm: Normal rate and regular rhythm.   Pulmonary:      Effort: Pulmonary effort is normal. No respiratory distress.   Skin:     General: Skin is warm and dry.   Neurological:      Mental Status: She is alert and oriented to person, place, and time.   Psychiatric:         Behavior: Behavior normal.         Thought Content: Thought content normal.         Judgment: Judgment normal.         Data:  Recent DEXA scan with osteoporosis    Earnestine Hernandez MD  Internal Medicine         [1]   Current Outpatient Medications on File Prior to Visit   Medication Sig Dispense Refill    ALPRAZolam (XANAX) 0.25 MG tablet Take 1 tablet (0.25 mg total) by mouth daily as needed for Anxiety (take 1/2 to 1 tablet as needed). 30 tablet 0    clotrimazole-betamethasone 1-0.05% (LOTRISONE) cream Apply topically 2 (two) times daily. 60 g 0 "    hydrocortisone 2.5 % cream Apply topically 2 (two) times daily. 28 g 5    multivit with minerals/lutein (MULTIVITAMIN 50 PLUS ORAL) Take 1 tablet by mouth.      omeprazole (PRILOSEC) 40 MG capsule Take 1 capsule (40 mg total) by mouth once daily. 90 capsule 3    sod chlor-bicarb-squeez bottle (NEILMED SINUS RINSE COMPLETE) pkdv 1 Units by sinus irrigation route 2 (two) times a day. Not right before bed 1 each 11    vit A/vit C/vit E/zinc/copper (PRESERVISION AREDS ORAL) Take 1 tablet by mouth once daily.      vitamin D (VITAMIN D3) 1000 units Tab Take 1,000 Units by mouth once daily.      oxybutynin (DITROPAN-XL) 5 MG TR24 Take 1 tablet (5 mg total) by mouth once daily. 30 tablet 1    zinc gluconate 50 mg tablet Take 50 mg by mouth once daily. (Patient not taking: Reported on 6/10/2025)       No current facility-administered medications on file prior to visit.

## 2025-06-19 DIAGNOSIS — K64.9 HEMORRHOIDS, UNSPECIFIED HEMORRHOID TYPE: ICD-10-CM

## 2025-06-19 RX ORDER — CLOTRIMAZOLE AND BETAMETHASONE DIPROPIONATE 10; .64 MG/G; MG/G
CREAM TOPICAL 2 TIMES DAILY
Qty: 60 G | Refills: 0 | Status: CANCELLED | OUTPATIENT
Start: 2025-06-19

## 2025-06-19 RX ORDER — CLOTRIMAZOLE AND BETAMETHASONE DIPROPIONATE 10; .64 MG/G; MG/G
CREAM TOPICAL 2 TIMES DAILY
Qty: 60 G | Refills: 0 | Status: SHIPPED | OUTPATIENT
Start: 2025-06-19

## 2025-06-24 ENCOUNTER — TELEPHONE (OUTPATIENT)
Dept: FAMILY MEDICINE | Facility: CLINIC | Age: OVER 89
End: 2025-06-24
Payer: MEDICARE

## 2025-06-24 NOTE — TELEPHONE ENCOUNTER
Copied from CRM #[id number]. Topic: General Inquiry - Patient Advice  >> Jun 24, 2025 12:24 PM Shanti wrote:  Type: Needs Medical Advice  Who Called:  pt  Symptoms (please be specific):    How long has patient had these symptoms:    Pharmacy name and phone #:    Best Call Back Number: [phone]   Additional Information: Speak to staff   Thanks

## 2025-06-24 NOTE — TELEPHONE ENCOUNTER
Pt is asking for refill on Lotrisone Cream . Our records  show rx sent in last week . Spoke w/ Shlomo at [facility] , rx is ready .-  Pt aware -lp

## 2025-07-01 ENCOUNTER — PATIENT MESSAGE (OUTPATIENT)
Dept: FAMILY MEDICINE | Facility: CLINIC | Age: OVER 89
End: 2025-07-01
Payer: MEDICARE